# Patient Record
Sex: FEMALE | Race: WHITE | NOT HISPANIC OR LATINO | Employment: FULL TIME | URBAN - NONMETROPOLITAN AREA
[De-identification: names, ages, dates, MRNs, and addresses within clinical notes are randomized per-mention and may not be internally consistent; named-entity substitution may affect disease eponyms.]

---

## 2017-04-10 DIAGNOSIS — Z85.51 PERSONAL HISTORY OF MALIGNANT NEOPLASM OF BLADDER: Primary | ICD-10-CM

## 2017-06-05 ENCOUNTER — TELEPHONE (OUTPATIENT)
Dept: UROLOGY | Facility: OTHER | Age: 57
End: 2017-06-05

## 2017-06-05 NOTE — TELEPHONE ENCOUNTER
Spoke with pt via phone, reminded pt of appt with Dr. Couch on 6/13/17 at 2:45 pm and reminded pt come in to clinic either tomorrow or the next day to give a urine, pt agreed.  RENETTA SALGADO

## 2017-06-07 DIAGNOSIS — Z85.51 PERSONAL HISTORY OF MALIGNANT NEOPLASM OF BLADDER: ICD-10-CM

## 2017-06-07 LAB
ALBUMIN UR-MCNC: NEGATIVE MG/DL
APPEARANCE UR: CLEAR
BILIRUB UR QL STRIP: NEGATIVE
COLOR UR AUTO: YELLOW
GLUCOSE UR STRIP-MCNC: NEGATIVE MG/DL
HGB UR QL STRIP: NEGATIVE
KETONES UR STRIP-MCNC: NEGATIVE MG/DL
LEUKOCYTE ESTERASE UR QL STRIP: NEGATIVE
NITRATE UR QL: NEGATIVE
PH UR STRIP: 6.5 PH (ref 5–7)
SP GR UR STRIP: 1.02 (ref 1–1.03)
URN SPEC COLLECT METH UR: NORMAL
UROBILINOGEN UR STRIP-ACNC: 1 EU/DL (ref 0.2–1)

## 2017-06-07 PROCEDURE — 81003 URINALYSIS AUTO W/O SCOPE: CPT | Performed by: UROLOGY

## 2017-06-08 DIAGNOSIS — Z85.51 PERSONAL HISTORY OF MALIGNANT NEOPLASM OF BLADDER: ICD-10-CM

## 2017-06-08 PROCEDURE — 88108 CYTOPATH CONCENTRATE TECH: CPT | Mod: TC | Performed by: UROLOGY

## 2017-06-09 LAB — COPATH REPORT: NORMAL

## 2017-06-13 ENCOUNTER — OFFICE VISIT (OUTPATIENT)
Dept: UROLOGY | Facility: OTHER | Age: 57
End: 2017-06-13
Attending: UROLOGY
Payer: COMMERCIAL

## 2017-06-13 VITALS
WEIGHT: 120 LBS | TEMPERATURE: 98.6 F | BODY MASS INDEX: 22.66 KG/M2 | HEIGHT: 61 IN | SYSTOLIC BLOOD PRESSURE: 158 MMHG | HEART RATE: 78 BPM | OXYGEN SATURATION: 98 % | DIASTOLIC BLOOD PRESSURE: 70 MMHG

## 2017-06-13 DIAGNOSIS — C67.8 MALIGNANT NEOPLASM OF OVERLAPPING SITES OF BLADDER (H): Primary | ICD-10-CM

## 2017-06-13 PROCEDURE — 52000 CYSTOURETHROSCOPY: CPT | Performed by: UROLOGY

## 2017-06-13 ASSESSMENT — PAIN SCALES - GENERAL: PAINLEVEL: NO PAIN (0)

## 2017-06-13 NOTE — NURSING NOTE
"Chief Complaint   Patient presents with     Cystoscopy     History of bladder cancer.       Initial /70 (BP Location: Right arm, Patient Position: Chair, Cuff Size: Adult Regular)  Pulse 78  Temp 98.6  F (37  C) (Tympanic)  Ht 1.549 m (5' 1\")  Wt 54.4 kg (120 lb)  LMP 06/18/2005  SpO2 98%  BMI 22.67 kg/m2 Estimated body mass index is 22.67 kg/(m^2) as calculated from the following:    Height as of this encounter: 1.549 m (5' 1\").    Weight as of this encounter: 54.4 kg (120 lb).  Medication Reconciliation: complete   RENETTA SALGADO      "

## 2017-06-13 NOTE — PROGRESS NOTES
Kait Augustin is a 56 year old female with a hx of bladder cancer TaN0M0 with a history of urothelial carcinoma here for follow up    No hematuria    Date of Initial Diagnosis: 2/15  Stage of Initial Diagnosis: Ta  Grade at Initial Diagnosis: low  Site of First Diagnosis: bladder  Any Recurrence? 12/2015 (right UO and left trigone)  Intravesical Treatments: TURBT and stent  Date of Last Cysto: 12/16  Date of Last Upper Tract Imaging: none    CYSTOSCOPY PROCEDURE:  After sterile preparation and draping of the patient,  a 16-Guamanian flexible cystoscope was introduced via the urethra.  It was passed without difficulty into the bladder.  The urethra was open without evidence of stricture.  The ureteral orifices were orthotopic.  The bladder mucosa was evaluated using both antegrade and retroflex views and this showed no evidence of any lesions or trabeculation.  There was a reddened area on the right bladder wall. There were no stones.  This appears somewhat stable from last time.      A/P Hx of Low grade bladder cancer with one small recurrence.  no evidence of disease today, will follow up in 3 months    Follow up for a cysto in  6 months then q12 after that    Cytology negative      Efren Couch MD  Department of Urology Staff  [facility]

## 2017-06-13 NOTE — MR AVS SNAPSHOT
"              After Visit Summary   2017    Kait Augustin    MRN: [id number]           Patient Information     Date Of Birth          1960        Visit Information        Provider Department      2017 3:00 PM Weight, Efren Fowler MD [facility]        Today's Diagnoses     Malignant neoplasm of overlapping sites of bladder (H)    -  1       Follow-ups after your visit        Who to contact     If you have questions or need follow up information about today's clinic visit or your schedule please contact [facility] directly at [phone].  Normal or non-critical lab and imaging results will be communicated to you by [employer], letter or phone within 4 business days after the clinic has received the results. If you do not hear from us within 7 days, please contact the clinic through [employer] or phone. If you have a critical or abnormal lab result, we will notify you by phone as soon as possible.  Submit refill requests through [employer] or call your pharmacy and they will forward the refill request to us. Please allow 3 business days for your refill to be completed.          Additional Information About Your Visit        MyChart Information     [employer] lets you send messages to your doctor, view your test results, renew your prescriptions, schedule appointments and more. To sign up, go to [email or web address] . Click on \"Log in\" on the left side of the screen, which will take you to the Welcome page. Then click on \"Sign up Now\" on the right side of the page.     You will be asked to enter the access code listed below, as well as some personal information. Please follow the directions to create your username and password.     Your access code is: [email or web address]  Expires: 2017  3:33 PM     Your access code will  in 90 days. If you need help or a new code, please call your [facility] or [phone].        Care EveryWhere ID     This is your Care " "EveryWhere ID. This could be used by other organizations to access your [place] medical records  [id number]        Your Vitals Were     Pulse Temperature Height Last Period Pulse Oximetry BMI (Body Mass Index)    78 98.6  F (37  C) (Tympanic) 1.549 m (5' 1\") 06/18/2005 98% 22.67 kg/m2       Blood Pressure from Last 3 Encounters:   06/13/17 158/70   12/13/16 118/68   09/13/16 110/60    Weight from Last 3 Encounters:   06/13/17 54.4 kg (120 lb)   12/13/16 57.6 kg (127 lb)   09/13/16 59.4 kg (131 lb)              We Performed the Following     Cystoscopy ([address]78)        Primary Care Provider Office Phone # Fax #    Itzel CampDEMARCO domínguez [phone]       [address] [address]        Thank you!     Thank you for choosing [facility]  for your care. Our goal is always to provide you with excellent care. Hearing back from our patients is one way we can continue to improve our services. Please take a few minutes to complete the written survey that you may receive in the mail after your visit with us. Thank you!             Your Updated Medication List - Protect others around you: Learn how to safely use, store and throw away your medicines at www.disposemymeds.org.          This list is accurate as of: 6/13/17  3:33 PM.  Always use your most recent med list.                   Brand Name Dispense Instructions for use    OMEGA-3 FISH OIL PO      Take 1 g by mouth daily as needed (when I remember to take them)       TYLENOL PO      Take 325 mg by mouth every 4 hours as needed for mild pain or fever       VITAMIN D (CHOLECALCIFEROL) PO      Take 400 Units by mouth daily as needed (when I remember to take them)         "

## 2017-12-08 ENCOUNTER — TELEPHONE (OUTPATIENT)
Dept: UROLOGY | Facility: OTHER | Age: 57
End: 2017-12-08

## 2017-12-08 DIAGNOSIS — C67.8 MALIGNANT NEOPLASM OF OVERLAPPING SITES OF BLADDER (H): Primary | ICD-10-CM

## 2017-12-08 NOTE — TELEPHONE ENCOUNTER
Patient is requesting labs prior to 12/12/17 appointment with Dr. Couch.  Please call patient when orders are in [phone]

## 2017-12-11 DIAGNOSIS — C67.8 MALIGNANT NEOPLASM OF OVERLAPPING SITES OF BLADDER (H): ICD-10-CM

## 2017-12-11 LAB
ALBUMIN UR-MCNC: NEGATIVE MG/DL
APPEARANCE UR: CLEAR
BILIRUB UR QL STRIP: NEGATIVE
COLOR UR AUTO: YELLOW
GLUCOSE UR STRIP-MCNC: NEGATIVE MG/DL
HGB UR QL STRIP: NEGATIVE
KETONES UR STRIP-MCNC: NEGATIVE MG/DL
LEUKOCYTE ESTERASE UR QL STRIP: NEGATIVE
NITRATE UR QL: NEGATIVE
PH UR STRIP: 6.5 PH (ref 5–7)
SOURCE: NORMAL
SP GR UR STRIP: 1.01 (ref 1–1.03)
UROBILINOGEN UR STRIP-ACNC: 0.2 EU/DL (ref 0.2–1)

## 2017-12-11 PROCEDURE — 81003 URINALYSIS AUTO W/O SCOPE: CPT | Performed by: UROLOGY

## 2017-12-12 ENCOUNTER — OFFICE VISIT (OUTPATIENT)
Dept: UROLOGY | Facility: OTHER | Age: 57
End: 2017-12-12
Attending: UROLOGY
Payer: COMMERCIAL

## 2017-12-12 VITALS
WEIGHT: 127 LBS | HEART RATE: 56 BPM | TEMPERATURE: 98.2 F | HEIGHT: 61 IN | DIASTOLIC BLOOD PRESSURE: 70 MMHG | SYSTOLIC BLOOD PRESSURE: 110 MMHG | BODY MASS INDEX: 23.98 KG/M2

## 2017-12-12 DIAGNOSIS — C67.8 MALIGNANT NEOPLASM OF OVERLAPPING SITES OF BLADDER (H): Primary | ICD-10-CM

## 2017-12-12 PROCEDURE — 52000 CYSTOURETHROSCOPY: CPT | Performed by: UROLOGY

## 2017-12-12 ASSESSMENT — PAIN SCALES - GENERAL: PAINLEVEL: NO PAIN (0)

## 2017-12-12 NOTE — PROGRESS NOTES
Kait Augustin is a 57 year old female with a hx of bladder cancer TaN0M0 with a history of urothelial carcinoma here for follow up    No hematuria    Date of Initial Diagnosis: 2/15  Stage of Initial Diagnosis: Ta  Grade at Initial Diagnosis: low  Site of First Diagnosis: bladder  Any Recurrence? 12/2015 (right UO and left trigone)  Intravesical Treatments: TURBT and stent  Date of Last Cysto: 6/17  Date of Last Upper Tract Imaging: none    CYSTOSCOPY PROCEDURE:  After sterile preparation and draping of the patient,  a 16-Mohawk flexible cystoscope was introduced via the urethra.  It was passed without difficulty into the bladder.  The urethra was open without evidence of stricture.  The ureteral orifices were orthotopic.  The bladder mucosa was evaluated using both antegrade and retroflex views and this showed no evidence of any lesions or trabeculation.  There was a reddened area on the right bladder wall. There were no stones.  This appears somewhat stable from last time.      A/P Hx of Low grade bladder cancer with one small recurrence.  no evidence of disease today    Follow up for a cysto in 12 months    Cytology negative      Efren Couch MD  Department of Urology Staff  [facility]

## 2017-12-12 NOTE — MR AVS SNAPSHOT
"              After Visit Summary   12/12/2017    Kait Augustin    MRN: [id number]           Patient Information     Date Of Birth          1960        Visit Information        Provider Department      12/12/2017 1:00 PM [facility]        Today's Diagnoses     Malignant neoplasm of overlapping sites of bladder (H)    -  1      Care Instructions      AFTER YOUR CYSTOSCOPY        You have just completed a cystoscopy, or \"cysto\", which allowed your physician to learn more about your bladder (or to remove a stent placed after surgery). We suggest that you continue to avoid caffeine, fruit juice, and alcohol for the next 24 hours, however, you are encouraged to return to your normal activities.         A few things that are considered normal after your cystoscopy:     * Small amount of bleeding (or spotting) that clears within the next 24 hours     * Slight burning sensation with urination     * Sensation to of needing to avoid more frequently     * The feeling of \"air\" in your urine     * Mild discomfort that is relieved with Tylenol        Please contact our office promptly if you:     * Develop a fever above 101 degrees     * Are unable to urinate     * Develop bright red blood that does not stop     * Severe pain or swelling         Please contact our office with any concerns or questions @[facility].          Follow-ups after your visit        Follow-up notes from your care team     Return in 12 months (on 12/12/2018).      Who to contact     If you have questions or need follow up information about today's clinic visit or your schedule please contact [facility] directly at [phone].  Normal or non-critical lab and imaging results will be communicated to you by MyChart, letter or phone within 4 business days after the clinic has received the results. If you do not hear from us within 7 days, please contact the clinic through MyChart or phone. If " "you have a critical or abnormal lab result, we will notify you by phone as soon as possible.  Submit refill requests through [employer] or call your pharmacy and they will forward the refill request to us. Please allow 3 business days for your refill to be completed.          Additional Information About Your Visit        [employer] Information     [employer] lets you send messages to your doctor, view your test results, renew your prescriptions, schedule appointments and more. To sign up, go to [email or web address] . Click on \"Log in\" on the left side of the screen, which will take you to the Welcome page. Then click on \"Sign up Now\" on the right side of the page.     You will be asked to enter the access code listed below, as well as some personal information. Please follow the directions to create your username and password.     Your access code is: [email or web address]  Expires: 3/12/2018  1:24 PM     Your access code will  in 90 days. If you need help or a new code, please call your [place] clinic or [phone].        Care EveryWhere ID     This is your Care EveryWhere ID. This could be used by other organizations to access your [place] medical records  [id number]        Your Vitals Were     Pulse Temperature Height Last Period BMI (Body Mass Index)       56 98.2  F (36.8  C) (Tympanic) 1.549 m (5' 1\") 2005 24 kg/m2        Blood Pressure from Last 3 Encounters:   17 110/70   17 158/70   16 118/68    Weight from Last 3 Encounters:   17 57.6 kg (127 lb)   17 54.4 kg (120 lb)   16 57.6 kg (127 lb)              We Performed the Following     CYSTOURETHROSCOPY        Primary Care Provider Office Phone # Fax #    Itzel Phillip [phone] [address]  [place] [address]        Equal Access to Services     DOUGLAS ESTES AH: Ngoc Gomez, pam hodges, rosibel issaarash la'aan ah. So " [facility] [phone].    ATENCIÓN: Si ochoa wylie, tiene a espinoza disposición servicios gratuitos de asistencia lingüística. Dorian nixon [phone].    We comply with applicable federal civil rights laws and [place] laws. We do not discriminate on the basis of race, color, national origin, age, disability, sex, sexual orientation, or gender identity.            Thank you!     Thank you for choosing [facility]  for your care. Our goal is always to provide you with excellent care. Hearing back from our patients is one way we can continue to improve our services. Please take a few minutes to complete the written survey that you may receive in the mail after your visit with us. Thank you!             Your Updated Medication List - Protect others around you: Learn how to safely use, store and throw away your medicines at www.disposemymeds.org.          This list is accurate as of: 12/12/17  1:24 PM.  Always use your most recent med list.                   Brand Name Dispense Instructions for use Diagnosis    OMEGA-3 FISH OIL PO      Take 1 g by mouth daily as needed (when I remember to take them)        TYLENOL PO      Take 325 mg by mouth every 4 hours as needed for mild pain or fever        VITAMIN D (CHOLECALCIFEROL) PO      Take 400 Units by mouth daily as needed (when I remember to take them)

## 2017-12-12 NOTE — NURSING NOTE
"Chief Complaint   Patient presents with     Cystoscopy     History of bladder of cancer.       Initial /70 (BP Location: Left arm, Patient Position: Chair, Cuff Size: Adult Regular)  Pulse 56  Temp 98.2  F (36.8  C) (Tympanic)  Ht 1.549 m (5' 1\")  Wt 57.6 kg (127 lb)  LMP 06/18/2005  BMI 24 kg/m2 Estimated body mass index is 24 kg/(m^2) as calculated from the following:    Height as of this encounter: 1.549 m (5' 1\").    Weight as of this encounter: 57.6 kg (127 lb).  Medication Reconciliation: complete   RENETTA SALGADO      "

## 2018-01-20 ENCOUNTER — HEALTH MAINTENANCE LETTER (OUTPATIENT)
Age: 58
End: 2018-01-20

## 2018-05-22 NOTE — TELEPHONE ENCOUNTER
U/A ordered. Patient called.  Masha Fritz LPN     Pt with SMITH likely ATN now in setting of obstructive uropathy, losartan use, contrast use on (5/21)  and possible rhabdomyolysis.  Pt had initial Scr at 2.6 on (5/18) at OSH. Pt with Scr 5.4 on (5/20) which further trended up to 7.09 on (5/22). Pt with good urine oupt on L-NT, however minimal urine outpt on R-NT. Recommend to check CPK and renal sonogram. Monitor BMP, strict I/O. No plan for urgent HD at present. Avoid any further nephrotoxics, NSAIDs, RCA Pt with SMITH likely ATN now. SMITH multifactorial  in setting of obstructive uropathy, losartan use, contrast use on (5/21)  and possible rhabdomyolysis.  Pt had initial Scr at 2.6 on (5/18) at OSH. Pt with Scr 5.4 on (5/20) which further trended up to 7.09 on (5/22). Pt with good urine oupt on L-NT, however minimal urine outpt on R-NT. Recommend to check CPK and renal sonogram. Monitor BMP, strict I/O. No plan for urgent HD at present. Avoid any further nephrotoxics, NSAIDs, RCA Pt with SMITH likely ATN now. SMITH multifactorial  in setting of obstructive uropathy, losartan use, contrast use on (5/21)  and possible rhabdomyolysis.  Pt had initial Scr at 2.6 on (5/18) at OSH. Pt with Scr 5.4 on (5/20) which further trended up to 7.09 on (5/22). Pt with good urine oupt on L-NT, however minimal urine outpt on R-NT. Recommend to check CPK and renal sonogram. Monitor BMP, strict I/O. No plan for urgent HD at present. Pt agreeable if needed and consent obtained. Avoid any further nephrotoxics, NSAIDs, RCA Pt with SMITH likely ATN now. SMITH multifactorial  in setting of obstructive uropathy, losartan use, contrast use on (5/21)  and possible rhabdomyolysis.  Pt had initial Scr at 2.6 on (5/18) at OSH. Pt with Scr 5.4 on (5/20) which further trended up to 7.09 on (5/22). Pt with good urine oupt on L-NT, however minimal urine outpt on R-NT. Recommend to check CPK, urine lytes and renal sonogram. Monitor BMP, strict I/O. No plan for urgent HD at present. Pt agreeable if needed and consent obtained. Avoid any further nephrotoxics, NSAIDs, RCA

## 2018-10-15 ENCOUNTER — OFFICE VISIT (OUTPATIENT)
Dept: FAMILY MEDICINE | Facility: OTHER | Age: 58
End: 2018-10-15
Attending: NURSE PRACTITIONER
Payer: COMMERCIAL

## 2018-10-15 VITALS
HEIGHT: 61 IN | SYSTOLIC BLOOD PRESSURE: 108 MMHG | BODY MASS INDEX: 23.41 KG/M2 | OXYGEN SATURATION: 99 % | RESPIRATION RATE: 14 BRPM | DIASTOLIC BLOOD PRESSURE: 62 MMHG | HEART RATE: 80 BPM | WEIGHT: 124 LBS | TEMPERATURE: 99.9 F

## 2018-10-15 DIAGNOSIS — Z12.31 ENCOUNTER FOR SCREENING MAMMOGRAM FOR BREAST CANCER: ICD-10-CM

## 2018-10-15 DIAGNOSIS — R82.79 ABNORMAL FINDINGS ON MICROBIOLOGICAL EXAMINATION OF URINE: ICD-10-CM

## 2018-10-15 DIAGNOSIS — N12 PYELONEPHRITIS: ICD-10-CM

## 2018-10-15 DIAGNOSIS — Z71.6 TOBACCO ABUSE COUNSELING: ICD-10-CM

## 2018-10-15 DIAGNOSIS — Z72.0 TOBACCO ABUSE: ICD-10-CM

## 2018-10-15 DIAGNOSIS — R30.0 DYSURIA: Primary | ICD-10-CM

## 2018-10-15 LAB
ALBUMIN SERPL-MCNC: 4 G/DL (ref 3.4–5)
ALBUMIN UR-MCNC: 100 MG/DL
ANION GAP SERPL CALCULATED.3IONS-SCNC: 9 MMOL/L (ref 3–14)
APPEARANCE UR: ABNORMAL
BACTERIA #/AREA URNS HPF: ABNORMAL /HPF
BASOPHILS # BLD AUTO: 0 10E9/L (ref 0–0.2)
BASOPHILS NFR BLD AUTO: 0.3 %
BILIRUB UR QL STRIP: ABNORMAL
BUN SERPL-MCNC: 12 MG/DL (ref 7–30)
CALCIUM SERPL-MCNC: 9.1 MG/DL (ref 8.5–10.1)
CHLORIDE SERPL-SCNC: 104 MMOL/L (ref 94–109)
CO2 SERPL-SCNC: 23 MMOL/L (ref 20–32)
COLOR UR AUTO: YELLOW
CREAT SERPL-MCNC: 0.72 MG/DL (ref 0.52–1.04)
DIFFERENTIAL METHOD BLD: ABNORMAL
EOSINOPHIL # BLD AUTO: 0 10E9/L (ref 0–0.7)
EOSINOPHIL NFR BLD AUTO: 0.1 %
ERYTHROCYTE [DISTWIDTH] IN BLOOD BY AUTOMATED COUNT: 13.2 % (ref 10–15)
GFR SERPL CREATININE-BSD FRML MDRD: 83 ML/MIN/1.7M2
GLUCOSE SERPL-MCNC: 104 MG/DL (ref 70–99)
GLUCOSE UR STRIP-MCNC: NEGATIVE MG/DL
HCT VFR BLD AUTO: 43.5 % (ref 35–47)
HGB BLD-MCNC: 14.8 G/DL (ref 11.7–15.7)
HGB UR QL STRIP: ABNORMAL
KETONES UR STRIP-MCNC: ABNORMAL MG/DL
LEUKOCYTE ESTERASE UR QL STRIP: ABNORMAL
LYMPHOCYTES # BLD AUTO: 2.1 10E9/L (ref 0.8–5.3)
LYMPHOCYTES NFR BLD AUTO: 14.7 %
MCH RBC QN AUTO: 31.9 PG (ref 26.5–33)
MCHC RBC AUTO-ENTMCNC: 34 G/DL (ref 31.5–36.5)
MCV RBC AUTO: 94 FL (ref 78–100)
MONOCYTES # BLD AUTO: 1.9 10E9/L (ref 0–1.3)
MONOCYTES NFR BLD AUTO: 13.2 %
NEUTROPHILS # BLD AUTO: 10.4 10E9/L (ref 1.6–8.3)
NEUTROPHILS NFR BLD AUTO: 71.7 %
NITRATE UR QL: POSITIVE
PH UR STRIP: 6 PH (ref 5–7)
PHOSPHATE SERPL-MCNC: 3 MG/DL (ref 2.5–4.5)
PLATELET # BLD AUTO: 184 10E9/L (ref 150–450)
POTASSIUM SERPL-SCNC: 3.7 MMOL/L (ref 3.4–5.3)
RBC # BLD AUTO: 4.64 10E12/L (ref 3.8–5.2)
RBC #/AREA URNS AUTO: ABNORMAL /HPF
SODIUM SERPL-SCNC: 136 MMOL/L (ref 133–144)
SOURCE: ABNORMAL
SP GR UR STRIP: >1.03 (ref 1–1.03)
UROBILINOGEN UR STRIP-ACNC: 1 EU/DL (ref 0.2–1)
WBC # BLD AUTO: 14.5 10E9/L (ref 4–11)
WBC #/AREA URNS AUTO: ABNORMAL /HPF

## 2018-10-15 PROCEDURE — 87186 SC STD MICRODIL/AGAR DIL: CPT | Performed by: NURSE PRACTITIONER

## 2018-10-15 PROCEDURE — 36415 COLL VENOUS BLD VENIPUNCTURE: CPT | Performed by: NURSE PRACTITIONER

## 2018-10-15 PROCEDURE — 87088 URINE BACTERIA CULTURE: CPT | Performed by: NURSE PRACTITIONER

## 2018-10-15 PROCEDURE — 85025 COMPLETE CBC W/AUTO DIFF WBC: CPT | Performed by: NURSE PRACTITIONER

## 2018-10-15 PROCEDURE — 80069 RENAL FUNCTION PANEL: CPT | Performed by: NURSE PRACTITIONER

## 2018-10-15 PROCEDURE — 81001 URINALYSIS AUTO W/SCOPE: CPT | Performed by: NURSE PRACTITIONER

## 2018-10-15 PROCEDURE — 87086 URINE CULTURE/COLONY COUNT: CPT | Performed by: NURSE PRACTITIONER

## 2018-10-15 PROCEDURE — 99214 OFFICE O/P EST MOD 30 MIN: CPT | Performed by: NURSE PRACTITIONER

## 2018-10-15 RX ORDER — CIPROFLOXACIN 500 MG/1
500 TABLET, FILM COATED ORAL 2 TIMES DAILY
Qty: 14 TABLET | Refills: 0 | Status: SHIPPED | OUTPATIENT
Start: 2018-10-15 | End: 2019-03-13

## 2018-10-15 RX ORDER — CEFTRIAXONE 1 G/1
2000 INJECTION, POWDER, FOR SOLUTION INTRAMUSCULAR; INTRAVENOUS ONCE
Qty: 20 ML | Refills: 0 | OUTPATIENT
Start: 2018-10-15 | End: 2019-03-13

## 2018-10-15 ASSESSMENT — ANXIETY QUESTIONNAIRES
1. FEELING NERVOUS, ANXIOUS, OR ON EDGE: SEVERAL DAYS
IF YOU CHECKED OFF ANY PROBLEMS ON THIS QUESTIONNAIRE, HOW DIFFICULT HAVE THESE PROBLEMS MADE IT FOR YOU TO DO YOUR WORK, TAKE CARE OF THINGS AT HOME, OR GET ALONG WITH OTHER PEOPLE: NOT DIFFICULT AT ALL
GAD7 TOTAL SCORE: 1
6. BECOMING EASILY ANNOYED OR IRRITABLE: NOT AT ALL
7. FEELING AFRAID AS IF SOMETHING AWFUL MIGHT HAPPEN: NOT AT ALL
5. BEING SO RESTLESS THAT IT IS HARD TO SIT STILL: NOT AT ALL
2. NOT BEING ABLE TO STOP OR CONTROL WORRYING: NOT AT ALL
3. WORRYING TOO MUCH ABOUT DIFFERENT THINGS: NOT AT ALL

## 2018-10-15 ASSESSMENT — PAIN SCALES - GENERAL: PAINLEVEL: EXTREME PAIN (8)

## 2018-10-15 ASSESSMENT — PATIENT HEALTH QUESTIONNAIRE - PHQ9: 5. POOR APPETITE OR OVEREATING: NOT AT ALL

## 2018-10-15 NOTE — NURSING NOTE
"Chief Complaint   Patient presents with     Urinary Problem       Initial /62 (BP Location: Right arm, Patient Position: Sitting, Cuff Size: Adult Regular)  Pulse 96  Temp 99.9  F (37.7  C) (Tympanic)  Resp 14  Ht 5' 1\" (1.549 m)  Wt 124 lb (56.2 kg)  LMP 06/18/2005  SpO2 99%  BMI 23.43 kg/m2 Estimated body mass index is 23.43 kg/(m^2) as calculated from the following:    Height as of this encounter: 5' 1\" (1.549 m).    Weight as of this encounter: 124 lb (56.2 kg).  Medication Reconciliation: complete    Sandra Harris LPN    "

## 2018-10-15 NOTE — PROGRESS NOTES
Pt will come in on thurs for CBC, reports feeling much better already after injections of Rocephin.

## 2018-10-15 NOTE — PROGRESS NOTES
I expected this to be high  Pyelonephritis  You'll be checking on her tomorrow afternoon    Should prob recommend a repeat CBC Wed to assure she is improving.   Lab only - entered

## 2018-10-15 NOTE — MR AVS SNAPSHOT
After Visit Summary   10/15/2018    Kait Augustin    MRN: [id number]           Patient Information     Date Of Birth          1960        Visit Information        Provider Department      10/15/2018 10:45 AM Itzel Phillip NP [facility]        Today's Diagnoses     Dysuria    -  1    Abnormal findings on microbiological examination of urine        Pyelonephritis        Encounter for screening mammogram for breast cancer        Tobacco abuse        Tobacco abuse counseling          Care Instructions    Results for orders placed or performed in visit on 10/15/18 (from the past 24 hour(s))   *UA reflex to Microscopic and Culture - [facility]   Result Value Ref Range    Color Urine Yellow     Appearance Urine Slightly Cloudy     Glucose Urine Negative NEG^Negative mg/dL    Bilirubin Urine Small (A) NEG^Negative    Ketones Urine Trace (A) NEG^Negative mg/dL    Specific Gravity Urine >1.030 1.003 - 1.035    Blood Urine Large (A) NEG^Negative    pH Urine 6.0 5.0 - 7.0 pH    Protein Albumin Urine 100 (A) NEG^Negative mg/dL    Urobilinogen Urine 1.0 0.2 - 1.0 EU/dL    Nitrite Urine Positive (A) NEG^Negative    Leukocyte Esterase Urine Trace (A) NEG^Negative    Source Midstream Urine    Urine Microscopic   Result Value Ref Range    WBC Urine 5-10 (A) OTO5^0 - 5 /HPF    RBC Urine 10-25 (A) OTO2^O - 2 /HPF    Bacteria Urine Many (A) NEG^Negative /HPF             ASSESSMENT/PLAN:     1. Dysuria  - UA reflex to Microscopic and Culture - [facility]  - Urine Microscopic    2. Abnormal findings on microbiological examination of urine  - Urine Culture Aerobic Bacterial  - CBC with platelets differential  - Renal panel  - cefTRIAXone (ROCEPHIN) 1 GM vial; Inject 2 g (2,000 mg) into the muscle once for 1 dose  Dispense: 20 mL; Refill: 0  - ciprofloxacin (CIPRO) 500 MG tablet; Take 1 tablet (500 mg) by mouth 2 times daily  Dispense: 14 tablet; Refill: 0    3. Pyelonephritis  -  cefTRIAXone (ROCEPHIN) 1 GM vial; Inject 2 g (2,000 mg) into the muscle once for 1 dose  Dispense: 20 mL; Refill: 0  - ciprofloxacin (CIPRO) 500 MG tablet; Take 1 tablet (500 mg) by mouth 2 times daily  Dispense: 14 tablet; Refill: 0      Increase fluids  Keep temp down, Tylenol OTC  Cranberry juice   To ER with increased symptoms      4. Encounter for screening mammogram for breast cancer  - MA SCREENING DIGITAL BILATERAL ([place]); Future    5. Tobacco abuse  - Tobacco Cessation - Order to Satisfy Health Maintenance    6. Tobacco abuse counseling  - See attached                          Pyelonephritis (Kidney Infection)             What is pyelonephritis?   Pyelonephritis is bacterial infection of one or both kidneys. Kidney infection can be serious because it can permanently damage the kidneys. Also, the infection may enter the bloodstream and cause life-threatening infection. Another problem is that it can cause pregnant women to go into labor too early (premature labor).   Kidney infections are much more common in women than men.   How does it occur?   Kidney infections usually start in the bladder or the urethra, which is the tube that empties urine from the bladder. Bladder infections can happen when bacteria travel from the vagina or rectal area (anus) into the urethra and bladder. The bacteria travel up to the kidneys from the bladder. In men, the infection might also start as a prostate infection. Bacteria can also spread to the kidneys from an infection somewhere else in the body.   The urinary system is a common site of birth defects. If your urinary system is abnormal, you have a greater risk that a bladder infection will spread to the kidneys.   Kidney stones increase the risk of infection.   What are the symptoms?   The symptoms range from mild to severe. They may include:   fever   chills or sweats   abdominal or back pain   loss of appetite   nausea or vomiting   problems with urination, such as  pain when you urinate or a frequent urge to urinate.   Sometimes it is hard to know whether urinary symptoms are caused by an infection of the lower urinary tract (for example, the bladder) or by a kidney infection. The symptoms should not be ignored. If you have symptoms, see your healthcare provider right away. A bladder infection can quickly lead to pyelonephritis. The infection can spread to the bloodstream. Do not wait to get treatment.   How is it diagnosed?   Your healthcare provider will review your medical history, looking especially for current or recent lower urinary tract infection. You will also have a physical exam. Your provider will check for pain in the kidney area. A sample of your urine will be tested in the lab.   How is it treated?   Antibiotic medicine is the main treatment for a kidney infection. You may be able to start your treatment at home. You may be able to keep being treated at home if your symptoms seem to be getting better within 24 hours after you start taking the medicine. If you are more seriously ill, or if the medicine does not seem to be working well, you may need to be in the hospital. There you can be given fluids and medicine through an IV.   Usually you will need to take an antibiotic for at least 7 days. All bacteria must be killed to prevent kidney damage and to keep the infection from coming back.   Your antibiotic may need to be changed after the first day or two of treatment if lab tests of the bacteria in your urine show a different antibiotic will work better. Your healthcare provider will tell you if this is the case.   If you have an abnormal urinary tract or stones in your urinary tract, you may need surgery.   How long do the effects last?   How long it takes to get better depends on how severe your symptoms are when you start treatment. In mild cases, you will start feeling better in the first 1 or 2 days. It may take several days if you have a more severe  infection.   With proper treatment there are usually no complications of pyelonephritis. If you keep having symptoms, or your symptoms come back soon after you finish your treatment, you may need more tests. Your healthcare provider will check for another cause of the infection or to see if another antibiotic is needed.   How can I take care of myself?   Follow the treatment plan your healthcare provider prescribes.   If you have a fever:   Ask your healthcare provider if you should take aspirin or acetaminophen for the fever. Check with your healthcare provider before you give any medicine that contains aspirin or salicylates to a child or teen. This includes medicines like baby aspirin, some cold medicines, and Pepto Bismol. Children and teens who take aspirin are at risk for a serious illness called Reye's syndrome.   Keep a daily record of your temperature.   Notice whether your symptoms get better within 1 to 2 days after you start taking the antibiotic.   Drink plenty of fluids.   Take your medicines exactly as your provider tells you to.   Tell your provider right away about any symptoms that get worse or come back.   Follow your healthcare provider's directions for a follow-up urine test. Your provider may want to test your urine soon after you finish taking the antibiotic.   Call your provider if you have new or worsening:   fever   chills or sweats   abdominal or back pain   nausea or vomiting   problems with urination, such as pain when you urinate or a frequent urge to urinate   blood in your urine.   How can I help prevent pyelonephritis?   The best way to prevent kidney infections is to try to prevent bladder infections.   Women can take the following steps to try to prevent bladder infections:   Drink plenty of water and other beverages.   Practice good hygiene when you use the toilet, such as wiping from front to back.   Avoid using irritating cosmetics or chemicals in the area of the vagina and  urethra, such as strong soaps and scented sanitary napkins or panty liners.   Urinate to empty your bladder after you have sex.   Men can take the following steps to try to prevent bladder infections:   Drink plenty of water and other beverages.   Practice good genital hygiene. If you are a man who has not been circumcised, good hygiene includes gently pulling back the foreskin to wash the tip of the penis every time you bathe or shower.   Urinate to empty your bladder after you have sex.          Published by [employer].  This content is reviewed periodically and is subject to change as new health information becomes available. The information is intended to inform and educate and is not a replacement for medical evaluation, advice, diagnosis or treatment by a healthcare professional.   Developed by [employer].   ? 2010 [facility] and/or its affiliates. All Rights Reserved.   Copyright   Clinical Reference Systems 2011          Itzel Phillip NP  [facility]      HOW TO QUIT SMOKING  Smoking is one of the hardest habits to break. About half of all those who have ever smoked have been able to quit, and most of those (about 70%) who still smoke want to quit. Here are some of the best ways to stop smoking.     KEEP TRYING:  It takes most smokers about 8 tries before they are finally able to fully quit. So, the more often you try and fail, the better your chance of quitting the next time! So, don't give up!    GO COLD TURKEY:  Most ex-smokers quit cold turkey. Trying to cut back gradually doesn't seem to work as well, perhaps because it continues the smoking habit. Also, it is possible to fool yourself by inhaling more while smoking fewer cigarettes. This results in the same amount of nicotine in your body!    GET SUPPORT:  Support programs can make an important difference, especially for the heavy smoker. These groups offer lectures, methods to change your behavior and peer support. Call the free  national Quitline for more information. 800-QUIT-NOW (033-874-0875). Low-cost or free programs are offered by many hospitals, local chapters of the American Lung Association (373-850-0399) and the American Cancer Society (975-444-9990). Support at home is important too. Non-smokers can help by offering praise and encouragement. If the smoker fails to quit, encourage them to try again!    OVER-THE-COUNTER MEDICINES:  For those who can't quit on their own, Nicotine Replacement Therapy (NRT) may make quitting much easier. Certain aids such as the nicotine patch, gum and lozenge are available without a prescription. However, it is best to use these under the guidance of your doctor. The skin patch provides a steady supply of nicotine to the body. Nicotine gum and lozenge gives temporary bursts of low levels of nicotine. Both methods take the edge off the craving for cigarettes. WARNING: If you feel symptoms of nicotine overdose, such as nausea, vomiting, dizziness, weakness, or fast heartbeat, stop using these and see your doctor.    PRESCRIPTION MEDICINES:  After evaluating your smoking patterns and prior attempts at quitting, your doctor may offer a prescription medicine such as bupropion (Zyban, Wellbutrin), varenicline (Chantix, Champix), a niocotine inhaler or nasal spray. Each has its unique advantage and side effects which your doctor can review with you.    HEALTH BENEFITS OF QUITTING:  The benefits of quitting start right away and keep improving the longer you go without smokin minutes: blood pressure and pulse return to normal  8 hours: oxygen levels return to normal  2 days: ability to smell and taste begins to improve as damaged nerves start to regrow  2-3 weeks: circulation and lung function improves  1-9 months: decreased cough, congestion and shortness of breath; less tired  1 year: risk of heart attack decreases by half  5 years: risk of lung cancer decreases by half; risk of stroke becomes the  same as a non-smoker  For information about how to quit smoking, visit the following links:  National Cancer [place] ,   Clearing the Air, Quit Smoking Today   - an online booklet. http://www.smokefree.gov/pubs/clearing_the_air.pdf  Smokefree.gov http://smokefree.gov/  QuitNet http://www.quitnet.com/    0170-8019 [employer], [address] [place] [address], [place] [address]. All rights reserved. This information is not intended as a substitute for professional medical care. Always follow your healthcare professional's instructions.    The Benefits of Living Smoke Free  What do you want to gain from quitting? Check off some reasons to quit.  Health Benefits  ___ Reduce my risk of lung cancer, heart disease, chronic lung disease  ___ Have fewer wrinkles and softer skin  ___ Improve my sense of taste and smell  ___ For pregnant women--reduce the risk of having a miscarriage, stillbirth, premature birth, or low-birth-weight baby  Personal Benefits  ___ Feel more in control of my life  ___ Have better-smelling hair, breath, clothes, home, and car  ___ Save time by not having to take smoke breaks, buy cigarettes, or hunt for a light  ___ Have whiter teeth  Family Benefits  ___ Reduce my children s respiratory tract infections  ___ Set a good example for my children  ___ Reduce my family s cancer risk  Financial Benefits  ___ Save hundreds of dollars each year that would be spent on cigarettes  ___ Save money on medical bills  ___ Save on life, health, and car insurance premiums    Those Dollars Add Up!  Cigarettes are expensive, and getting more expensive all the time. Do you realize how much money you are spending on cigarettes per year? What is the average amount you spend on a pack of cigarettes? What is the average number of packs that you smoke per day? Using your answers to these questions, fill in this formula to help you find out:  ($ _____ per pack) ×  ( _____ number of packs per day) × (365 days) =  $ _____  yearly cost of smoking  Besides tobacco, there are other costs, including extra cleaning bills and replacement costs for clothing and furniture; medical expenses for smoking-related illnesses; and higher health, life, and car insurance premiums.    Cigars and Pipes Count Too!  Cigars and pipes are also dangerous. So are smokeless (chewing) tobacco and snuff. All of these products contain nicotine, a highly addictive substance that has harmful effects on your body. Quitting smoking means giving up all tobacco products.      5953-0691 [facility], [address] [place] [address], [place] [address]. All rights reserved. This information is not intended as a substitute for professional medical care. Always follow your healthcare professional's instructions.          Follow-ups after your visit        Future tests that were ordered for you today     Open Future Orders        Priority Expected Expires Ordered    MA SCREENING DIGITAL BILATERAL ([place]) Routine  10/15/2019 10/15/2018            Who to contact     If you have questions or need follow up information about today's clinic visit or your schedule please contact [facility] directly at [phone].  Normal or non-critical lab and imaging results will be communicated to you by MyChart, letter or phone within 4 business days after the clinic has received the results. If you do not hear from us within 7 days, please contact the clinic through MyChart or phone. If you have a critical or abnormal lab result, we will notify you by phone as soon as possible.  Submit refill requests through [employer] or call your pharmacy and they will forward the refill request to us. Please allow 3 business days for your refill to be completed.          Additional Information About Your Visit        Care EveryWhere ID     This is your Care EveryWhere ID. This could be used by other organizations to access your [place] medical records  [id number]        Your Vitals  "Were     Pulse Temperature Respirations Height Last Period Pulse Oximetry    80 99.9  F (37.7  C) (Tympanic) 14 5' 1\" (1.549 m) 06/18/2005 99%    BMI (Body Mass Index)                   23.43 kg/m2            Blood Pressure from Last 3 Encounters:   10/15/18 108/62   12/12/17 110/70   06/13/17 158/70    Weight from Last 3 Encounters:   10/15/18 124 lb (56.2 kg)   12/12/17 127 lb (57.6 kg)   06/13/17 120 lb (54.4 kg)              We Performed the Following     *UA reflex to Microscopic and Culture - [facility]     CBC with platelets differential     Renal panel     Tobacco Cessation - Order to Satisfy Health Maintenance     Urine Culture Aerobic Bacterial     Urine Microscopic          Today's Medication Changes          These changes are accurate as of 10/15/18 11:33 AM.  If you have any questions, ask your nurse or doctor.               Start taking these medicines.        Dose/Directions    cefTRIAXone 1 GM vial   Commonly known as:  ROCEPHIN   Used for:  Pyelonephritis, Abnormal findings on microbiological examination of urine   Started by:  Itzel Phillip NP        Dose:  2000 mg   Inject 2 g (2,000 mg) into the muscle once for 1 dose   Quantity:  20 mL   Refills:  0       ciprofloxacin 500 MG tablet   Commonly known as:  CIPRO   Used for:  Abnormal findings on microbiological examination of urine, Pyelonephritis   Started by:  Itzel hPillip NP        Dose:  500 mg   Take 1 tablet (500 mg) by mouth 2 times daily   Quantity:  14 tablet   Refills:  0            Where to get your medicines      These medications were sent to [id number] - [place], [place] [address] [facility] [address]  [address], [place] [address]     Phone:  [phone]     ciprofloxacin 500 MG tablet         Some of these will need a paper prescription and others can be bought over the counter.  Ask your nurse if you have questions.     You don't need a prescription for these medications     " cefTRIAXone 1 GM vial                Primary Care Provider Office Phone # Fax #    Itzel Phillip [phone] [address]  [place] [address]        Equal Access to Services     DOUGLAS ESTES : Hadii aad ku hadsusano Soomaali, waaxda luqadaha, qaybta kaalmada adeegyada, rosibel estebanin hayaascot garciabrittneekizzy hewitt. So [facility] [phone].    ATENCIÓN: Si habla español, tiene a espinoza disposición servicios gratuitos de asistencia lingüística. Llame al [phone].    We comply with applicable federal civil rights laws and [place] laws. We do not discriminate on the basis of race, color, national origin, age, disability, sex, sexual orientation, or gender identity.            Thank you!     Thank you for choosing [facility]  for your care. Our goal is always to provide you with excellent care. Hearing back from our patients is one way we can continue to improve our services. Please take a few minutes to complete the written survey that you may receive in the mail after your visit with us. Thank you!             Your Updated Medication List - Protect others around you: Learn how to safely use, store and throw away your medicines at www.disposemymeds.org.          This list is accurate as of 10/15/18 11:33 AM.  Always use your most recent med list.                   Brand Name Dispense Instructions for use Diagnosis    cefTRIAXone 1 GM vial    ROCEPHIN    20 mL    Inject 2 g (2,000 mg) into the muscle once for 1 dose    Pyelonephritis, Abnormal findings on microbiological examination of urine       ciprofloxacin 500 MG tablet    CIPRO    14 tablet    Take 1 tablet (500 mg) by mouth 2 times daily    Abnormal findings on microbiological examination of urine, Pyelonephritis       OMEGA-3 FISH OIL PO      Take 1 g by mouth daily as needed (when I remember to take them)        TYLENOL PO      Take 325 mg by mouth every 4 hours as needed for mild pain or fever        VITAMIN D  (CHOLECALCIFEROL) PO      Take 400 Units by mouth daily as needed (when I remember to take them)

## 2018-10-15 NOTE — NURSING NOTE
The following medication was given:     MEDICATION: Rocephin 2000mg and Lidocaine 4.2cc  ROUTE: IM  SITE: LUQ - Gluteus and RUQ-gluteus  DOSE: 1000mg each dose  LOT #: [id number]  :  Brandie  EXPIRATION DATE:  11/2020  NDC#: 9489-2638-24  Pt waits 15 min, no adverse reaction    Janis Rubin

## 2018-10-15 NOTE — PROGRESS NOTES
SUBJECTIVE:   Kait Augustin is a 58 year old female who presents to clinic today for the following health issues:        URINARY TRACT SYMPTOMS  Onset: Friday    Description:   Painful urination (Dysuria): no   Blood in urine (Hematuria): no     Delay in urine (Hesitency): no     Intensity: moderate    Frequency    Progression of Symptoms:  worsening    Accompanying Signs & Symptoms:  Fever/chills: YES  Flank pain YES  Nausea and vomiting: no   Any vaginal symptoms: none  Abdominal/Pelvic Pain: no     History:   History of frequent UTI's: no   History of kidney stones: YES  Sexually Active: no   Possibility of pregnancy: No    Precipitating factors:   none  Therapies Tried and outcome:  OTC advil or tylenol         Renal calculi many years ago x 1        PHQ-9 SCORE 11/17/2014 10/15/2018   Total Score 11 -   Total Score - 0     ADONIS-7 SCORE 10/15/2018   Total Score 1         Problem list and histories reviewed & adjusted, as indicated.  Additional history: as documented    Patient Active Problem List   Diagnosis     H/O renal calculi     Tobacco abuse     Malignant neoplasm of overlapping sites of bladder (H)     ACP (advance care planning)     Past Surgical History:   Procedure Laterality Date     C LIGATE FALLOPIAN TUBE  1996     CYSTOSCOPY, RETROGRADES, INSERT STENT URETER(S), COMBINED Right 12/8/2015    Procedure: COMBINED CYSTOSCOPY, RETROGRADES, INSERT STENT URETER(S);  Surgeon: Efren Couch MD;  Location: [facility]     CYSTOSCOPY, TRANSURETHRAL RESECTION (TUR) TUMOR BLADDER, COMBINED N/A 2/10/2015    Procedure: COMBINED CYSTOSCOPY, TRANSURETHRAL RESECTION (TUR) TUMOR BLADDER;  Surgeon: Efren Couch MD;  Location: [facility]     CYSTOSCOPY, TRANSURETHRAL RESECTION (TUR) TUMOR BLADDER, COMBINED N/A 12/8/2015    Procedure: COMBINED CYSTOSCOPY, TRANSURETHRAL RESECTION (TUR) TUMOR BLADDER;  Surgeon: Efren Couch MD;  Location: [facility]       Social History   Substance Use Topics      Smoking status: Current Every Day Smoker     Packs/day: 1.00     Years: 25.00     Types: Cigarettes     Smokeless tobacco: Never Used     Alcohol use Yes      Comment: rare     Family History   Problem Relation Age of Onset     C.A.D. Father      dx late 60's, CABG, angioplasty     Cancer Father      Lung, dx age 80 h/o tobacco use     HEART DISEASE Mother      Thyroid Disease Mother      Lipids Sister          Current Outpatient Prescriptions   Medication Sig Dispense Refill     Acetaminophen (TYLENOL PO) Take 325 mg by mouth every 4 hours as needed for mild pain or fever       Omega-3 Fatty Acids (OMEGA-3 FISH OIL PO) Take 1 g by mouth daily as needed (when I remember to take them)        VITAMIN D, CHOLECALCIFEROL, PO Take 400 Units by mouth daily as needed (when I remember to take them)        Allergies   Allergen Reactions     No Known Allergies      Recent Labs   Lab Test  12/01/15   1544  08/06/15   1508  11/17/14   1052   LDL   --    --   180*   HDL   --    --   65   TRIG   --    --   128   CR  0.70  0.98  0.71   GFRESTIMATED  87  59*  86   GFRESTBLACK  >90   GFR Calc    72  >90   GFR Calc     POTASSIUM  3.8  3.8  3.9   TSH   --    --   1.00      BP Readings from Last 3 Encounters:   10/15/18 108/62   12/12/17 110/70   06/13/17 158/70    Wt Readings from Last 3 Encounters:   10/15/18 124 lb (56.2 kg)   12/12/17 127 lb (57.6 kg)   06/13/17 120 lb (54.4 kg)                  Labs reviewed in EPIC    Reviewed and updated as needed this visit by clinical staff  Tobacco  Allergies  Meds       Reviewed and updated as needed this visit by Provider         ROS:  CONSTITUTIONAL:fever, chills, body aches  INTEGUMENTARY/SKIN: NEGATIVE for worrisome rashes, moles or lesions  EYES: NEGATIVE for vision changes or irritation  ENT/MOUTH: NEGATIVE for ear, mouth and throat problems  RESP: NEGATIVE for significant cough or SOB  CV: NEGATIVE for chest pain, palpitations or peripheral  "edema  GI: NEGATIVE for nausea, abdominal pain, heartburn, or change in bowel habits   female: retention and urgency  MUSCULOSKELETAL: NEGATIVE for significant arthralgias or myalgia  NEURO: NEGATIVE for weakness, dizziness or paresthesias  ENDOCRINE: NEGATIVE for temperature intolerance, skin/hair changes  HEME: NEGATIVE for bleeding problems  PSYCHIATRIC: NEGATIVE for changes in mood or affect    OBJECTIVE:     /62 (BP Location: Right arm, Patient Position: Sitting, Cuff Size: Adult Regular)  Pulse 96  Temp 99.9  F (37.7  C) (Tympanic)  Resp 14  Ht 5' 1\" (1.549 m)  Wt 124 lb (56.2 kg)  LMP 06/18/2005  SpO2 99%  BMI 23.43 kg/m2  Body mass index is 23.43 kg/(m^2).     GENERAL: healthy, alert and no distress  EYES: Eyes grossly normal to inspection, PERRL and conjunctivae and sclerae normal  HENT: ear canals and TM's normal, nose and mouth without ulcers or lesions  NECK: no adenopathy, no asymmetry, masses, or scars and thyroid normal to palpation  RESP: lungs clear to auscultation - no rales, rhonchi or wheezes  CV: regular rate and rhythm, normal S1 S2, no S3 or S4, no murmur, click or rub, no peripheral edema and peripheral pulses strong  ABDOMEN: soft, nontender, no hepatosplenomegaly, no masses and bowel sounds normal  MS: bilateral low back pain, tightness  SKIN: no suspicious lesions or rashes  PSYCH: mentation appears normal, affect normal/bright      Diagnostic Test Results:  Results for orders placed or performed in visit on 10/15/18 (from the past 24 hour(s))   *UA reflex to Microscopic and Culture - [facility]   Result Value Ref Range    Color Urine Yellow     Appearance Urine Slightly Cloudy     Glucose Urine Negative NEG^Negative mg/dL    Bilirubin Urine Small (A) NEG^Negative    Ketones Urine Trace (A) NEG^Negative mg/dL    Specific Gravity Urine >1.030 1.003 - 1.035    Blood Urine Large (A) NEG^Negative    pH Urine 6.0 5.0 - 7.0 pH    Protein Albumin Urine 100 (A) NEG^Negative " mg/dL    Urobilinogen Urine 1.0 0.2 - 1.0 EU/dL    Nitrite Urine Positive (A) NEG^Negative    Leukocyte Esterase Urine Trace (A) NEG^Negative    Source Midstream Urine    Urine Microscopic   Result Value Ref Range    WBC Urine 5-10 (A) OTO5^0 - 5 /HPF    RBC Urine 10-25 (A) OTO2^O - 2 /HPF    Bacteria Urine Many (A) NEG^Negative /HPF       ASSESSMENT/PLAN:     1. Dysuria  - UA reflex to Microscopic and Culture - [facility]  - Urine Microscopic    2. Abnormal findings on microbiological examination of urine  - Urine Culture Aerobic Bacterial  - CBC with platelets differential  - Renal panel  - cefTRIAXone (ROCEPHIN) 1 GM vial; Inject 2 g (2,000 mg) into the muscle once for 1 dose  Dispense: 20 mL; Refill: 0  - ciprofloxacin (CIPRO) 500 MG tablet; Take 1 tablet (500 mg) by mouth 2 times daily  Dispense: 14 tablet; Refill: 0    3. Pyelonephritis  - cefTRIAXone (ROCEPHIN) 1 GM vial; Inject 2 g (2,000 mg) into the muscle once for 1 dose  Dispense: 20 mL; Refill: 0  - ciprofloxacin (CIPRO) 500 MG tablet; Take 1 tablet (500 mg) by mouth 2 times daily  Dispense: 14 tablet; Refill: 0      Increase fluids  Keep temp down, Tylenol OTC  Cranberry juice   To ER with increased symptoms      4. Encounter for screening mammogram for breast cancer  - [facility] SCREENING DIGITAL BILATERAL ([place]); Future    5. Tobacco abuse  - Tobacco Cessation - Order to Satisfy Health Maintenance    6. Tobacco abuse counseling  - See attached                          Pyelonephritis (Kidney Infection)             What is pyelonephritis?   Pyelonephritis is bacterial infection of one or both kidneys. Kidney infection can be serious because it can permanently damage the kidneys. Also, the infection may enter the bloodstream and cause life-threatening infection. Another problem is that it can cause pregnant women to go into labor too early (premature labor).   Kidney infections are much more common in women than men.   How does it occur?   Kidney  infections usually start in the bladder or the urethra, which is the tube that empties urine from the bladder. Bladder infections can happen when bacteria travel from the vagina or rectal area (anus) into the urethra and bladder. The bacteria travel up to the kidneys from the bladder. In men, the infection might also start as a prostate infection. Bacteria can also spread to the kidneys from an infection somewhere else in the body.   The urinary system is a common site of birth defects. If your urinary system is abnormal, you have a greater risk that a bladder infection will spread to the kidneys.   Kidney stones increase the risk of infection.   What are the symptoms?   The symptoms range from mild to severe. They may include:   fever   chills or sweats   abdominal or back pain   loss of appetite   nausea or vomiting   problems with urination, such as pain when you urinate or a frequent urge to urinate.   Sometimes it is hard to know whether urinary symptoms are caused by an infection of the lower urinary tract (for example, the bladder) or by a kidney infection. The symptoms should not be ignored. If you have symptoms, see your healthcare provider right away. A bladder infection can quickly lead to pyelonephritis. The infection can spread to the bloodstream. Do not wait to get treatment.   How is it diagnosed?   Your healthcare provider will review your medical history, looking especially for current or recent lower urinary tract infection. You will also have a physical exam. Your provider will check for pain in the kidney area. A sample of your urine will be tested in the lab.   How is it treated?   Antibiotic medicine is the main treatment for a kidney infection. You may be able to start your treatment at home. You may be able to keep being treated at home if your symptoms seem to be getting better within 24 hours after you start taking the medicine. If you are more seriously ill, or if the medicine does not  seem to be working well, you may need to be in the hospital. There you can be given fluids and medicine through an IV.   Usually you will need to take an antibiotic for at least 7 days. All bacteria must be killed to prevent kidney damage and to keep the infection from coming back.   Your antibiotic may need to be changed after the first day or two of treatment if lab tests of the bacteria in your urine show a different antibiotic will work better. Your healthcare provider will tell you if this is the case.   If you have an abnormal urinary tract or stones in your urinary tract, you may need surgery.   How long do the effects last?   How long it takes to get better depends on how severe your symptoms are when you start treatment. In mild cases, you will start feeling better in the first 1 or 2 days. It may take several days if you have a more severe infection.   With proper treatment there are usually no complications of pyelonephritis. If you keep having symptoms, or your symptoms come back soon after you finish your treatment, you may need more tests. Your healthcare provider will check for another cause of the infection or to see if another antibiotic is needed.   How can I take care of myself?   Follow the treatment plan your healthcare provider prescribes.   If you have a fever:   Ask your healthcare provider if you should take aspirin or acetaminophen for the fever. Check with your healthcare provider before you give any medicine that contains aspirin or salicylates to a child or teen. This includes medicines like baby aspirin, some cold medicines, and Pepto Bismol. Children and teens who take aspirin are at risk for a serious illness called Reye's syndrome.   Keep a daily record of your temperature.   Notice whether your symptoms get better within 1 to 2 days after you start taking the antibiotic.   Drink plenty of fluids.   Take your medicines exactly as your provider tells you to.   Tell your provider right  away about any symptoms that get worse or come back.   Follow your healthcare provider's directions for a follow-up urine test. Your provider may want to test your urine soon after you finish taking the antibiotic.   Call your provider if you have new or worsening:   fever   chills or sweats   abdominal or back pain   nausea or vomiting   problems with urination, such as pain when you urinate or a frequent urge to urinate   blood in your urine.   How can I help prevent pyelonephritis?   The best way to prevent kidney infections is to try to prevent bladder infections.   Women can take the following steps to try to prevent bladder infections:   Drink plenty of water and other beverages.   Practice good hygiene when you use the toilet, such as wiping from front to back.   Avoid using irritating cosmetics or chemicals in the area of the vagina and urethra, such as strong soaps and scented sanitary napkins or panty liners.   Urinate to empty your bladder after you have sex.   Men can take the following steps to try to prevent bladder infections:   Drink plenty of water and other beverages.   Practice good genital hygiene. If you are a man who has not been circumcised, good hygiene includes gently pulling back the foreskin to wash the tip of the penis every time you bathe or shower.   Urinate to empty your bladder after you have sex.          Published by [employer].  This content is reviewed periodically and is subject to change as new health information becomes available. The information is intended to inform and educate and is not a replacement for medical evaluation, advice, diagnosis or treatment by a healthcare professional.   Developed by [employer].   ? 2010 [employer] and/or its affiliates. All Rights Reserved.   Copyright   [employer] 2011          Itzel Phillip NP  [facility]

## 2018-10-15 NOTE — PROGRESS NOTES
Normal, please notify patient  UC pending    Itzel Phillip [facility]  [phone]

## 2018-10-15 NOTE — PATIENT INSTRUCTIONS
Results for orders placed or performed in visit on 10/15/18 (from the past 24 hour(s))   *UA reflex to Microscopic and Culture - [facility]   Result Value Ref Range    Color Urine Yellow     Appearance Urine Slightly Cloudy     Glucose Urine Negative NEG^Negative mg/dL    Bilirubin Urine Small (A) NEG^Negative    Ketones Urine Trace (A) NEG^Negative mg/dL    Specific Gravity Urine >1.030 1.003 - 1.035    Blood Urine Large (A) NEG^Negative    pH Urine 6.0 5.0 - 7.0 pH    Protein Albumin Urine 100 (A) NEG^Negative mg/dL    Urobilinogen Urine 1.0 0.2 - 1.0 EU/dL    Nitrite Urine Positive (A) NEG^Negative    Leukocyte Esterase Urine Trace (A) NEG^Negative    Source Midstream Urine    Urine Microscopic   Result Value Ref Range    WBC Urine 5-10 (A) OTO5^0 - 5 /HPF    RBC Urine 10-25 (A) OTO2^O - 2 /HPF    Bacteria Urine Many (A) NEG^Negative /HPF             ASSESSMENT/PLAN:     1. Dysuria  - UA reflex to Microscopic and Culture - [facility]  - Urine Microscopic    2. Abnormal findings on microbiological examination of urine  - Urine Culture Aerobic Bacterial  - CBC with platelets differential  - Renal panel  - cefTRIAXone (ROCEPHIN) 1 GM vial; Inject 2 g (2,000 mg) into the muscle once for 1 dose  Dispense: 20 mL; Refill: 0  - ciprofloxacin (CIPRO) 500 MG tablet; Take 1 tablet (500 mg) by mouth 2 times daily  Dispense: 14 tablet; Refill: 0    3. Pyelonephritis  - cefTRIAXone (ROCEPHIN) 1 GM vial; Inject 2 g (2,000 mg) into the muscle once for 1 dose  Dispense: 20 mL; Refill: 0  - ciprofloxacin (CIPRO) 500 MG tablet; Take 1 tablet (500 mg) by mouth 2 times daily  Dispense: 14 tablet; Refill: 0      Increase fluids  Keep temp down, Tylenol OTC  Cranberry juice   To ER with increased symptoms      4. Encounter for screening mammogram for breast cancer  - MA SCREENING DIGITAL BILATERAL ([place]); Future    5. Tobacco abuse  - Tobacco Cessation - Order to Satisfy Health Maintenance    6. Tobacco abuse  counseling  - See attached                          Pyelonephritis (Kidney Infection)             What is pyelonephritis?   Pyelonephritis is bacterial infection of one or both kidneys. Kidney infection can be serious because it can permanently damage the kidneys. Also, the infection may enter the bloodstream and cause life-threatening infection. Another problem is that it can cause pregnant women to go into labor too early (premature labor).   Kidney infections are much more common in women than men.   How does it occur?   Kidney infections usually start in the bladder or the urethra, which is the tube that empties urine from the bladder. Bladder infections can happen when bacteria travel from the vagina or rectal area (anus) into the urethra and bladder. The bacteria travel up to the kidneys from the bladder. In men, the infection might also start as a prostate infection. Bacteria can also spread to the kidneys from an infection somewhere else in the body.   The urinary system is a common site of birth defects. If your urinary system is abnormal, you have a greater risk that a bladder infection will spread to the kidneys.   Kidney stones increase the risk of infection.   What are the symptoms?   The symptoms range from mild to severe. They may include:   fever   chills or sweats   abdominal or back pain   loss of appetite   nausea or vomiting   problems with urination, such as pain when you urinate or a frequent urge to urinate.   Sometimes it is hard to know whether urinary symptoms are caused by an infection of the lower urinary tract (for example, the bladder) or by a kidney infection. The symptoms should not be ignored. If you have symptoms, see your healthcare provider right away. A bladder infection can quickly lead to pyelonephritis. The infection can spread to the bloodstream. Do not wait to get treatment.   How is it diagnosed?   Your healthcare provider will review your medical history, looking  especially for current or recent lower urinary tract infection. You will also have a physical exam. Your provider will check for pain in the kidney area. A sample of your urine will be tested in the lab.   How is it treated?   Antibiotic medicine is the main treatment for a kidney infection. You may be able to start your treatment at home. You may be able to keep being treated at home if your symptoms seem to be getting better within 24 hours after you start taking the medicine. If you are more seriously ill, or if the medicine does not seem to be working well, you may need to be in the hospital. There you can be given fluids and medicine through an IV.   Usually you will need to take an antibiotic for at least 7 days. All bacteria must be killed to prevent kidney damage and to keep the infection from coming back.   Your antibiotic may need to be changed after the first day or two of treatment if lab tests of the bacteria in your urine show a different antibiotic will work better. Your healthcare provider will tell you if this is the case.   If you have an abnormal urinary tract or stones in your urinary tract, you may need surgery.   How long do the effects last?   How long it takes to get better depends on how severe your symptoms are when you start treatment. In mild cases, you will start feeling better in the first 1 or 2 days. It may take several days if you have a more severe infection.   With proper treatment there are usually no complications of pyelonephritis. If you keep having symptoms, or your symptoms come back soon after you finish your treatment, you may need more tests. Your healthcare provider will check for another cause of the infection or to see if another antibiotic is needed.   How can I take care of myself?   Follow the treatment plan your healthcare provider prescribes.   If you have a fever:   Ask your healthcare provider if you should take aspirin or acetaminophen for the fever. Check with  your healthcare provider before you give any medicine that contains aspirin or salicylates to a child or teen. This includes medicines like baby aspirin, some cold medicines, and Pepto Bismol. Children and teens who take aspirin are at risk for a serious illness called Reye's syndrome.   Keep a daily record of your temperature.   Notice whether your symptoms get better within 1 to 2 days after you start taking the antibiotic.   Drink plenty of fluids.   Take your medicines exactly as your provider tells you to.   Tell your provider right away about any symptoms that get worse or come back.   Follow your healthcare provider's directions for a follow-up urine test. Your provider may want to test your urine soon after you finish taking the antibiotic.   Call your provider if you have new or worsening:   fever   chills or sweats   abdominal or back pain   nausea or vomiting   problems with urination, such as pain when you urinate or a frequent urge to urinate   blood in your urine.   How can I help prevent pyelonephritis?   The best way to prevent kidney infections is to try to prevent bladder infections.   Women can take the following steps to try to prevent bladder infections:   Drink plenty of water and other beverages.   Practice good hygiene when you use the toilet, such as wiping from front to back.   Avoid using irritating cosmetics or chemicals in the area of the vagina and urethra, such as strong soaps and scented sanitary napkins or panty liners.   Urinate to empty your bladder after you have sex.   Men can take the following steps to try to prevent bladder infections:   Drink plenty of water and other beverages.   Practice good genital hygiene. If you are a man who has not been circumcised, good hygiene includes gently pulling back the foreskin to wash the tip of the penis every time you bathe or shower.   Urinate to empty your bladder after you have sex.          Published by [employer].  This content is  reviewed periodically and is subject to change as new health information becomes available. The information is intended to inform and educate and is not a replacement for medical evaluation, advice, diagnosis or treatment by a healthcare professional.   Developed by [employer].   ? 2010 [employer] and/or its affiliates. All Rights Reserved.   Copyright   Clinical Reference Systems 2011          Itzel Phillip NP  [facility]      HOW TO QUIT SMOKING  Smoking is one of the hardest habits to break. About half of all those who have ever smoked have been able to quit, and most of those (about 70%) who still smoke want to quit. Here are some of the best ways to stop smoking.     KEEP TRYING:  It takes most smokers about 8 tries before they are finally able to fully quit. So, the more often you try and fail, the better your chance of quitting the next time! So, don't give up!    GO COLD TURKEY:  Most ex-smokers quit cold turkey. Trying to cut back gradually doesn't seem to work as well, perhaps because it continues the smoking habit. Also, it is possible to fool yourself by inhaling more while smoking fewer cigarettes. This results in the same amount of nicotine in your body!    GET SUPPORT:  Support programs can make an important difference, especially for the heavy smoker. These groups offer lectures, methods to change your behavior and peer support. Call the free national Quitline for more information. 800-QUIT-NOW (371-337-2948). Low-cost or free programs are offered by many hospitals, local chapters of the American Lung Association (783-156-5787) and the American Cancer Society (760-436-5880). Support at home is important too. Non-smokers can help by offering praise and encouragement. If the smoker fails to quit, encourage them to try again!    OVER-THE-COUNTER MEDICINES:  For those who can't quit on their own, Nicotine Replacement Therapy (NRT) may make quitting much easier. Certain aids such as  the nicotine patch, gum and lozenge are available without a prescription. However, it is best to use these under the guidance of your doctor. The skin patch provides a steady supply of nicotine to the body. Nicotine gum and lozenge gives temporary bursts of low levels of nicotine. Both methods take the edge off the craving for cigarettes. WARNING: If you feel symptoms of nicotine overdose, such as nausea, vomiting, dizziness, weakness, or fast heartbeat, stop using these and see your doctor.    PRESCRIPTION MEDICINES:  After evaluating your smoking patterns and prior attempts at quitting, your doctor may offer a prescription medicine such as bupropion (Zyban, Wellbutrin), varenicline (Chantix, Champix), a niocotine inhaler or nasal spray. Each has its unique advantage and side effects which your doctor can review with you.    HEALTH BENEFITS OF QUITTING:  The benefits of quitting start right away and keep improving the longer you go without smokin minutes: blood pressure and pulse return to normal  8 hours: oxygen levels return to normal  2 days: ability to smell and taste begins to improve as damaged nerves start to regrow  2-3 weeks: circulation and lung function improves  1-9 months: decreased cough, congestion and shortness of breath; less tired  1 year: risk of heart attack decreases by half  5 years: risk of lung cancer decreases by half; risk of stroke becomes the same as a non-smoker  For information about how to quit smoking, visit the following links:  National Cancer [place] ,   Clearing the Air, Quit Smoking Today   - an online booklet. http://www.smokefree.gov/pubs/clearing_the_air.pdf  Smokefree.gov http://smokefree.gov/  QuitNet http://www.quitnet.com/    5670-8879 [employer], [address] [place] [address], [place] [address]. All rights reserved. This information is not intended as a substitute for professional medical care. Always follow your healthcare professional's instructions.    The  Benefits of Living Smoke Free  What do you want to gain from quitting? Check off some reasons to quit.  Health Benefits  ___ Reduce my risk of lung cancer, heart disease, chronic lung disease  ___ Have fewer wrinkles and softer skin  ___ Improve my sense of taste and smell  ___ For pregnant women--reduce the risk of having a miscarriage, stillbirth, premature birth, or low-birth-weight baby  Personal Benefits  ___ Feel more in control of my life  ___ Have better-smelling hair, breath, clothes, home, and car  ___ Save time by not having to take smoke breaks, buy cigarettes, or hunt for a light  ___ Have whiter teeth  Family Benefits  ___ Reduce my children s respiratory tract infections  ___ Set a good example for my children  ___ Reduce my family s cancer risk  Financial Benefits  ___ Save hundreds of dollars each year that would be spent on cigarettes  ___ Save money on medical bills  ___ Save on life, health, and car insurance premiums    Those Dollars Add Up!  Cigarettes are expensive, and getting more expensive all the time. Do you realize how much money you are spending on cigarettes per year? What is the average amount you spend on a pack of cigarettes? What is the average number of packs that you smoke per day? Using your answers to these questions, fill in this formula to help you find out:  ($ _____ per pack) ×  ( _____ number of packs per day) × (365 days) =  $ _____ yearly cost of smoking  Besides tobacco, there are other costs, including extra cleaning bills and replacement costs for clothing and furniture; medical expenses for smoking-related illnesses; and higher health, life, and car insurance premiums.    Cigars and Pipes Count Too!  Cigars and pipes are also dangerous. So are smokeless (chewing) tobacco and snuff. All of these products contain nicotine, a highly addictive substance that has harmful effects on your body. Quitting smoking means giving up all tobacco products.      2263-3556 Hunter  [employer], [address] [place] [address], [place] [address]. All rights reserved. This information is not intended as a substitute for professional medical care. Always follow your healthcare professional's instructions.

## 2018-10-16 ASSESSMENT — PATIENT HEALTH QUESTIONNAIRE - PHQ9: SUM OF ALL RESPONSES TO PHQ QUESTIONS 1-9: 0

## 2018-10-16 ASSESSMENT — ANXIETY QUESTIONNAIRES: GAD7 TOTAL SCORE: 1

## 2018-10-17 ENCOUNTER — TELEPHONE (OUTPATIENT)
Dept: FAMILY MEDICINE | Facility: OTHER | Age: 58
End: 2018-10-17

## 2018-10-17 DIAGNOSIS — B37.31 CANDIDIASIS OF VAGINA: ICD-10-CM

## 2018-10-17 DIAGNOSIS — N10 ACUTE PYELONEPHRITIS: Primary | ICD-10-CM

## 2018-10-17 DIAGNOSIS — N10 ACUTE PYELONEPHRITIS: ICD-10-CM

## 2018-10-17 LAB
BACTERIA SPEC CULT: ABNORMAL
BASOPHILS # BLD AUTO: 0 10E9/L (ref 0–0.2)
BASOPHILS NFR BLD AUTO: 0.5 %
DIFFERENTIAL METHOD BLD: NORMAL
EOSINOPHIL # BLD AUTO: 0.2 10E9/L (ref 0–0.7)
EOSINOPHIL NFR BLD AUTO: 2.4 %
ERYTHROCYTE [DISTWIDTH] IN BLOOD BY AUTOMATED COUNT: 12.6 % (ref 10–15)
HCT VFR BLD AUTO: 37.5 % (ref 35–47)
HGB BLD-MCNC: 12.8 G/DL (ref 11.7–15.7)
LYMPHOCYTES # BLD AUTO: 2.6 10E9/L (ref 0.8–5.3)
LYMPHOCYTES NFR BLD AUTO: 31.1 %
MCH RBC QN AUTO: 31.4 PG (ref 26.5–33)
MCHC RBC AUTO-ENTMCNC: 34.1 G/DL (ref 31.5–36.5)
MCV RBC AUTO: 92 FL (ref 78–100)
MONOCYTES # BLD AUTO: 1 10E9/L (ref 0–1.3)
MONOCYTES NFR BLD AUTO: 11.8 %
NEUTROPHILS # BLD AUTO: 4.4 10E9/L (ref 1.6–8.3)
NEUTROPHILS NFR BLD AUTO: 54.2 %
PLATELET # BLD AUTO: 237 10E9/L (ref 150–450)
RBC # BLD AUTO: 4.08 10E12/L (ref 3.8–5.2)
SPECIMEN SOURCE: ABNORMAL
WBC # BLD AUTO: 8.2 10E9/L (ref 4–11)

## 2018-10-17 PROCEDURE — 36415 COLL VENOUS BLD VENIPUNCTURE: CPT | Performed by: NURSE PRACTITIONER

## 2018-10-17 PROCEDURE — 85025 COMPLETE CBC W/AUTO DIFF WBC: CPT | Performed by: NURSE PRACTITIONER

## 2018-10-17 RX ORDER — FLUCONAZOLE 150 MG/1
TABLET ORAL
Qty: 3 TABLET | Refills: 0 | Status: SHIPPED | OUTPATIENT
Start: 2018-10-17 | End: 2018-10-22

## 2018-10-17 NOTE — PROGRESS NOTES
Called to check on pt, was in a few days ago with pyelonephritis, left message to call and let us know how she's feeling and if needs anything further.

## 2018-10-18 ENCOUNTER — TELEPHONE (OUTPATIENT)
Dept: FAMILY MEDICINE | Facility: OTHER | Age: 58
End: 2018-10-18

## 2018-10-18 NOTE — PROGRESS NOTES
Checked on pt , was in early this week with kidney infection, reports doing better, WBC is back to normal, will call if needs anything  further

## 2018-10-21 ENCOUNTER — HEALTH MAINTENANCE LETTER (OUTPATIENT)
Age: 58
End: 2018-10-21

## 2018-10-22 ENCOUNTER — TELEPHONE (OUTPATIENT)
Dept: FAMILY MEDICINE | Facility: OTHER | Age: 58
End: 2018-10-22

## 2018-10-22 DIAGNOSIS — B37.31 CANDIDIASIS OF VAGINA: ICD-10-CM

## 2018-10-22 RX ORDER — FLUCONAZOLE 150 MG/1
TABLET ORAL
Qty: 3 TABLET | Refills: 0 | COMMUNITY
Start: 2018-10-22 | End: 2019-03-13

## 2018-10-22 NOTE — TELEPHONE ENCOUNTER
Call in ad additional 3 days of diflucan  Will need wet prep repeated if still symptomatic    Itzel Phillip [facility]  [phone]

## 2018-10-22 NOTE — TELEPHONE ENCOUNTER
Called to [facility] and pt notified to follow-up if no better, took previous dose before antibiotic was finished.

## 2018-10-22 NOTE — TELEPHONE ENCOUNTER
Yeast infection should be resolved.    [facility]  [phone]

## 2019-03-13 ENCOUNTER — OFFICE VISIT (OUTPATIENT)
Dept: FAMILY MEDICINE | Facility: OTHER | Age: 59
End: 2019-03-13
Attending: NURSE PRACTITIONER
Payer: COMMERCIAL

## 2019-03-13 VITALS
OXYGEN SATURATION: 98 % | BODY MASS INDEX: 25.3 KG/M2 | RESPIRATION RATE: 14 BRPM | SYSTOLIC BLOOD PRESSURE: 126 MMHG | TEMPERATURE: 97.4 F | HEART RATE: 64 BPM | WEIGHT: 134 LBS | HEIGHT: 61 IN | DIASTOLIC BLOOD PRESSURE: 72 MMHG

## 2019-03-13 DIAGNOSIS — R31.0 GROSS HEMATURIA: ICD-10-CM

## 2019-03-13 DIAGNOSIS — Z12.31 ENCOUNTER FOR SCREENING MAMMOGRAM FOR BREAST CANCER: ICD-10-CM

## 2019-03-13 DIAGNOSIS — C67.8 MALIGNANT NEOPLASM OF OVERLAPPING SITES OF BLADDER (H): Primary | ICD-10-CM

## 2019-03-13 LAB
ALBUMIN SERPL-MCNC: 4.4 G/DL (ref 3.4–5)
ALBUMIN UR-MCNC: NEGATIVE MG/DL
ALP SERPL-CCNC: 117 U/L (ref 40–150)
ALT SERPL W P-5'-P-CCNC: 33 U/L (ref 0–50)
ANION GAP SERPL CALCULATED.3IONS-SCNC: 7 MMOL/L (ref 3–14)
APPEARANCE UR: CLEAR
AST SERPL W P-5'-P-CCNC: 20 U/L (ref 0–45)
BASOPHILS # BLD AUTO: 0.1 10E9/L (ref 0–0.2)
BASOPHILS NFR BLD AUTO: 0.7 %
BILIRUB SERPL-MCNC: 0.6 MG/DL (ref 0.2–1.3)
BILIRUB UR QL STRIP: NEGATIVE
BUN SERPL-MCNC: 14 MG/DL (ref 7–30)
CALCIUM SERPL-MCNC: 9 MG/DL (ref 8.5–10.1)
CHLORIDE SERPL-SCNC: 110 MMOL/L (ref 94–109)
CO2 SERPL-SCNC: 24 MMOL/L (ref 20–32)
COLOR UR AUTO: YELLOW
CREAT SERPL-MCNC: 0.71 MG/DL (ref 0.52–1.04)
DIFFERENTIAL METHOD BLD: NORMAL
EOSINOPHIL # BLD AUTO: 0.2 10E9/L (ref 0–0.7)
EOSINOPHIL NFR BLD AUTO: 2 %
ERYTHROCYTE [DISTWIDTH] IN BLOOD BY AUTOMATED COUNT: 12.8 % (ref 10–15)
GFR SERPL CREATININE-BSD FRML MDRD: >90 ML/MIN/{1.73_M2}
GLUCOSE SERPL-MCNC: 100 MG/DL (ref 70–99)
GLUCOSE UR STRIP-MCNC: NEGATIVE MG/DL
HCT VFR BLD AUTO: 41.3 % (ref 35–47)
HGB BLD-MCNC: 13.9 G/DL (ref 11.7–15.7)
HGB UR QL STRIP: NEGATIVE
KETONES UR STRIP-MCNC: NEGATIVE MG/DL
LEUKOCYTE ESTERASE UR QL STRIP: NEGATIVE
LYMPHOCYTES # BLD AUTO: 2.8 10E9/L (ref 0.8–5.3)
LYMPHOCYTES NFR BLD AUTO: 36.5 %
MCH RBC QN AUTO: 31.7 PG (ref 26.5–33)
MCHC RBC AUTO-ENTMCNC: 33.7 G/DL (ref 31.5–36.5)
MCV RBC AUTO: 94 FL (ref 78–100)
MONOCYTES # BLD AUTO: 0.7 10E9/L (ref 0–1.3)
MONOCYTES NFR BLD AUTO: 8.8 %
NEUTROPHILS # BLD AUTO: 4 10E9/L (ref 1.6–8.3)
NEUTROPHILS NFR BLD AUTO: 52 %
NITRATE UR QL: NEGATIVE
PH UR STRIP: 6.5 PH (ref 5–7)
PLATELET # BLD AUTO: 225 10E9/L (ref 150–450)
POTASSIUM SERPL-SCNC: 4.1 MMOL/L (ref 3.4–5.3)
PROT SERPL-MCNC: 7.4 G/DL (ref 6.8–8.8)
RBC # BLD AUTO: 4.38 10E12/L (ref 3.8–5.2)
SODIUM SERPL-SCNC: 141 MMOL/L (ref 133–144)
SOURCE: NORMAL
SP GR UR STRIP: 1.02 (ref 1–1.03)
UROBILINOGEN UR STRIP-ACNC: 0.2 EU/DL (ref 0.2–1)
WBC # BLD AUTO: 7.6 10E9/L (ref 4–11)

## 2019-03-13 PROCEDURE — 85025 COMPLETE CBC W/AUTO DIFF WBC: CPT | Performed by: NURSE PRACTITIONER

## 2019-03-13 PROCEDURE — 36415 COLL VENOUS BLD VENIPUNCTURE: CPT | Performed by: NURSE PRACTITIONER

## 2019-03-13 PROCEDURE — 80053 COMPREHEN METABOLIC PANEL: CPT | Performed by: NURSE PRACTITIONER

## 2019-03-13 PROCEDURE — 81003 URINALYSIS AUTO W/O SCOPE: CPT | Performed by: NURSE PRACTITIONER

## 2019-03-13 PROCEDURE — 99213 OFFICE O/P EST LOW 20 MIN: CPT | Performed by: NURSE PRACTITIONER

## 2019-03-13 ASSESSMENT — PAIN SCALES - GENERAL: PAINLEVEL: NO PAIN (0)

## 2019-03-13 ASSESSMENT — MIFFLIN-ST. JEOR: SCORE: 1125.2

## 2019-03-13 NOTE — Clinical Note
I am referring this pt back to you as she is overdue for Follow-up, and is experiencing hematuria.CBC, UA, CMP ordered.Date of Initial Diagnosis: 2/15Stage of Initial Diagnosis: TaGrade at Initial Diagnosis: lowSite of First Diagnosis: bladderAny Recurrence? 12/2015 (right UO and left trigone)Intravesical Treatments: TURBT and stentLast cystoscopy 12/12/18Itzel [phone]-[phone]

## 2019-03-13 NOTE — PROGRESS NOTES
SUBJECTIVE:   Kait Augustin is a 58 year old female who presents to clinic today for the following health issues:      Blood in urine - history of bladder cancer diagnosed in 2015, follows with Dr. Couch, due for FU    Duration: 1 month ago and 1 day last week    Description (location/character/radiation): blood in urine    Intensity:  mild    Accompanying signs and symptoms: none    History (similar episodes/previous evaluation): history of bladder cancer, missed her annual appointment with her oncologist    Precipitating or alleviating factors: None    Therapies tried and outcome: None         Date of Initial Diagnosis: 2/15  Stage of Initial Diagnosis: Ta  Grade at Initial Diagnosis: low  Site of First Diagnosis: bladder  Any Recurrence? 12/2015 (right UO and left trigone)  Intravesical Treatments: TURBT and stent  Last cystoscopy 12/12/18      Insurance will not cover a colonoscopy, so she declines      Problem list and histories reviewed & adjusted, as indicated.  Additional history: as documented    Patient Active Problem List   Diagnosis     H/O renal calculi     Malignant neoplasm of overlapping sites of bladder (H)     ACP (advance care planning)     Past Surgical History:   Procedure Laterality Date     C LIGATE FALLOPIAN TUBE  1996     CYSTOSCOPY, RETROGRADES, INSERT STENT URETER(S), COMBINED Right 12/8/2015    Procedure: COMBINED CYSTOSCOPY, RETROGRADES, INSERT STENT URETER(S);  Surgeon: Efren Couch MD;  Location: [facility]     CYSTOSCOPY, TRANSURETHRAL RESECTION (TUR) TUMOR BLADDER, COMBINED N/A 2/10/2015    Procedure: COMBINED CYSTOSCOPY, TRANSURETHRAL RESECTION (TUR) TUMOR BLADDER;  Surgeon: Efren Couch MD;  Location: [facility]     CYSTOSCOPY, TRANSURETHRAL RESECTION (TUR) TUMOR BLADDER, COMBINED N/A 12/8/2015    Procedure: COMBINED CYSTOSCOPY, TRANSURETHRAL RESECTION (TUR) TUMOR BLADDER;  Surgeon: Efren Couch MD;  Location: [facility]       Social History  "    Tobacco Use     Smoking status: Former Smoker     Packs/day: 1.00     Years: 45.00     Pack years: 45.00     Types: Cigarettes     Smokeless tobacco: Never Used   Substance Use Topics     Alcohol use: Yes     Comment: rare     Family History   Problem Relation Age of Onset     C.A.D. Father         dx late 60's, CABG, angioplasty     Cancer Father         Lung, dx age 80 h/o tobacco use     Heart Disease Mother      Thyroid Disease Mother      Lipids Sister          No current outpatient medications on file.     Allergies   Allergen Reactions     No Known Allergies      Recent Labs   Lab Test 10/15/18  1134 12/01/15  1544  11/17/14  1052   LDL  --   --   --  180*   HDL  --   --   --  65   TRIG  --   --   --  128   CR 0.72 0.70   < > 0.71   GFRESTIMATED 83 87   < > 86   GFRESTBLACK >90 >90  African American GFR Calc     < > >90   GFR Calc     POTASSIUM 3.7 3.8   < > 3.9   TSH  --   --   --  1.00    < > = values in this interval not displayed.      BP Readings from Last 3 Encounters:   03/13/19 126/72   10/15/18 108/62   12/12/17 110/70    Wt Readings from Last 3 Encounters:   03/13/19 60.8 kg (134 lb)   10/15/18 56.2 kg (124 lb)   12/12/17 57.6 kg (127 lb)                  Labs reviewed in EPIC    Reviewed and updated as needed this visit by clinical staff  Tobacco  Allergies  Meds       Reviewed and updated as needed this visit by Provider         ROS:  Constitutional, HEENT, cardiovascular, pulmonary, gi and gu systems are negative, except as otherwise noted.    OBJECTIVE:     /72 (BP Location: Left arm, Patient Position: Sitting, Cuff Size: Adult Regular)   Pulse 64   Temp 97.4  F (36.3  C) (Tympanic)   Resp 14   Ht 1.549 m (5' 1\")   Wt 60.8 kg (134 lb)   LMP 06/18/2005   SpO2 98%   BMI 25.32 kg/m    Body mass index is 25.32 kg/m .     GENERAL: healthy, alert and no distress  EYES: Eyes grossly normal to inspection, PERRL and conjunctivae and sclerae normal  HENT: ear canals " and TM's normal, nose and mouth without ulcers or lesions  NECK: no adenopathy, no asymmetry, masses, or scars and thyroid normal to palpation  RESP: lungs clear to auscultation - no rales, rhonchi or wheezes  CV: regular rate and rhythm, normal S1 S2, no S3 or S4, no murmur, click or rub, no peripheral edema and peripheral pulses strong  ABDOMEN: soft, nontender, no hepatosplenomegaly, no masses and bowel sounds normal  MS: no gross musculoskeletal defects noted, no edema  SKIN: no suspicious lesions or rashes  PSYCH: mentation appears normal, affect normal/bright        ASSESSMENT/PLAN:     1. Malignant neoplasm of overlapping sites of bladder (H)  - UA reflex to Microscopic and Culture - [facility]  - Comprehensive metabolic panel (BMP + Alb, Alk Phos, ALT, AST, Total. Bili, TP)  - UROLOGY ADULT REFERRAL  - CBC with platelets differential    2. Encounter for screening mammogram for breast cancer  - [place] Screening Digital Bilateral; Future    3. Gross hematuria  - CBC with platelets differential    Message sent to Dr. Couch to update him on status      Itzel Phillip NP  [facility] [place] [facility]

## 2019-03-13 NOTE — PATIENT INSTRUCTIONS
ASSESSMENT/PLAN:     1. Malignant neoplasm of overlapping sites of bladder (H)  - UA reflex to Microscopic and Culture - [facility]  - Comprehensive metabolic panel (BMP + Alb, Alk Phos, ALT, AST, Total. Bili, TP)  - UROLOGY ADULT REFERRAL  - CBC with platelets differential    2. Encounter for screening mammogram for breast cancer  - MA Screening Digital Bilateral; Future    3. Gross hematuria  - CBC with platelets differential    Message sent to Dr. Couch to update him on status      Itzel Phillip NP  [facility] [place] [facility]

## 2019-03-13 NOTE — NURSING NOTE
"Chief Complaint   Patient presents with     Urinary Problem       Initial /72 (BP Location: Left arm, Patient Position: Sitting, Cuff Size: Adult Regular)   Pulse 64   Temp 97.4  F (36.3  C) (Tympanic)   Resp 14   Ht 1.549 m (5' 1\")   Wt 60.8 kg (134 lb)   LMP 06/18/2005   SpO2 98%   BMI 25.32 kg/m   Estimated body mass index is 25.32 kg/m  as calculated from the following:    Height as of this encounter: 1.549 m (5' 1\").    Weight as of this encounter: 60.8 kg (134 lb).  Medication Reconciliation: complete    Sandra Harris LPN    "

## 2019-03-21 ENCOUNTER — ANCILLARY PROCEDURE (OUTPATIENT)
Dept: MAMMOGRAPHY | Facility: OTHER | Age: 59
End: 2019-03-21
Attending: NURSE PRACTITIONER
Payer: COMMERCIAL

## 2019-03-21 DIAGNOSIS — Z12.31 ENCOUNTER FOR SCREENING MAMMOGRAM FOR BREAST CANCER: ICD-10-CM

## 2019-03-21 PROCEDURE — 77067 SCR MAMMO BI INCL CAD: CPT | Mod: TC

## 2019-04-24 ENCOUNTER — OFFICE VISIT (OUTPATIENT)
Dept: UROLOGY | Facility: OTHER | Age: 59
End: 2019-04-24
Attending: UROLOGY
Payer: COMMERCIAL

## 2019-04-24 VITALS
RESPIRATION RATE: 16 BRPM | SYSTOLIC BLOOD PRESSURE: 114 MMHG | WEIGHT: 134 LBS | DIASTOLIC BLOOD PRESSURE: 68 MMHG | TEMPERATURE: 97.8 F | HEIGHT: 61 IN | HEART RATE: 75 BPM | OXYGEN SATURATION: 96 % | BODY MASS INDEX: 25.3 KG/M2

## 2019-04-24 DIAGNOSIS — Z85.51 HISTORY OF BLADDER CANCER: ICD-10-CM

## 2019-04-24 DIAGNOSIS — D49.4 BLADDER TUMOR: Primary | ICD-10-CM

## 2019-04-24 PROCEDURE — 99214 OFFICE O/P EST MOD 30 MIN: CPT | Mod: 25 | Performed by: UROLOGY

## 2019-04-24 PROCEDURE — 52000 CYSTOURETHROSCOPY: CPT | Performed by: UROLOGY

## 2019-04-24 ASSESSMENT — MIFFLIN-ST. JEOR: SCORE: 1125.2

## 2019-04-24 ASSESSMENT — PAIN SCALES - GENERAL: PAINLEVEL: NO PAIN (0)

## 2019-04-24 NOTE — NURSING NOTE
Patient positioned in frog leg position prior to Delfino Walker MD prepping patient with chlorhexidine gluconate cleanser.    [place] Protocol    A. Pre-procedure verification complete Yes   1-relevant information / documentation available, reviewed and properly matched to the patient; 2-consent accurate and complete, 3-equipment and supplies available    B. Site marking complete N/A  Site marked if not in continuous attendance with patient    C. TIME OUT completed Yes  Time Out was conducted just prior to starting procedure to verify the eight required elements: 1-patient identity, 2-consent accurate and complete, 3-position, 4-correct side/site marked (if applicable), 5-procedure, 6-relevant images / results properly labeled and displayed (if applicable), 7-antibiotics / irrigation fluids (if applicable), 8-safety precautions.    After procedure perineum area rinsed. Discharge instructions reviewed with patient. Patient verbalized understanding of discharge instructions and discharged ambulatory.  Mariza Rubin..................4/24/2019  10:10 AM

## 2019-04-24 NOTE — NURSING NOTE
"Chief Complaint   Patient presents with     Cystoscopy     history of bladder cancer. Former [facility] patient.       Initial /68   Pulse 75   Temp 97.8  F (36.6  C) (Tympanic)   Resp 16   Ht 1.549 m (5' 1\")   Wt 60.8 kg (134 lb)   LMP 06/18/2005   SpO2 96%   BMI 25.32 kg/m   Estimated body mass index is 25.32 kg/m  as calculated from the following:    Height as of this encounter: 1.549 m (5' 1\").    Weight as of this encounter: 60.8 kg (134 lb).  Medication Reconciliation: complete   Review of Systems:    Weight loss:    No     Recent fever/chills:  No   Night sweats:   No  Current skin rash:  No   Recent hair loss:  No  Heat intolerance:  No   Cold intolerance:  No  Chest pain:   No   Palpitations:   No  Shortness of breath:  No   Wheezing:   No  Constipation:    yes   Diarrhea:   yes   Nausea:   No   Vomiting:   No   Kidney/side pain:  No   Back pain:   yes  Frequent headaches:  No   Dizziness:     No  Leg swelling:   No   Calf pain:    No    Parents, brothers or sisters with history of kidney cancer:   No  Parents, brothers or sisters with history of bladder cancer: No      Marilou Staley LPN    "

## 2019-04-24 NOTE — PATIENT INSTRUCTIONS

## 2019-04-24 NOTE — PROGRESS NOTES
I was asked to see this patient by Itzel Phillip NP and provide my opinion about the following: History of bladder cancer  Type of Visit  Consult    Chief Complaint  History of bladder cancer    HPI  Ms. Augustin is a 58 year old female who presents with history of bladder cancer to establish care.  She underwent TURBT 2/2015 and 12/2015.  She did receive postoperative mitomycin-C in December 2015.  Right ureteral orifice was involved.  She denies flank pain bilaterally.  Pathology was [facility].  She denies gross hematuria.  Her last surveillance cystoscopy was 1 year ago.      Past Medical History  She  has a past medical history of H/O renal calculi (11/17/2014), MIGRAINE HEADACHES (1996), Nicotine dependence (11/17/2014), PONV (postoperative nausea and vomiting), and Tobacco abuse (11/17/2014).  Patient Active Problem List   Diagnosis     H/O renal calculi     Malignant neoplasm of overlapping sites of bladder (H)     ACP (advance care planning)       Past Surgical History  She  has a past surgical history that includes LIGATE FALLOPIAN TUBE (1996); Cystoscopy, transurethral resection (TUR) tumor bladder, combined (N/A, 2/10/2015); Cystoscopy, transurethral resection (TUR) tumor bladder, combined (N/A, 12/8/2015); and Cystoscopy, retrogrades, insert stent ureter(s), combined (Right, 12/8/2015).    Medications  She currently has no medications in their medication list.    Allergies  Allergies   Allergen Reactions     No Known Allergies        Social History  She  reports that she has quit smoking. Her smoking use included cigarettes. She has a 45.00 pack-year smoking history. She has never used smokeless tobacco. She reports that she drinks alcohol. She reports that she does not use drugs.  No drug abuse.    Family History  Family History   Problem Relation Age of Onset     C.A.D. Father         dx late 60's, CABG, angioplasty     Cancer Father         Lung, dx age 80 h/o tobacco use     Heart Disease Mother       "Thyroid Disease Mother      Lipids Sister        Review of Systems  I personally reviewed the ROS with the patient.    Nursing Notes:   Marilou Staley LPN  4/24/2019  9:39 AM  Signed  Chief Complaint   Patient presents with     Cystoscopy     history of bladder cancer. Former [facility] patient.       Initial /68   Pulse 75   Temp 97.8  F (36.6  C) (Tympanic)   Resp 16   Ht 1.549 m (5' 1\")   Wt 60.8 kg (134 lb)   LMP 06/18/2005   SpO2 96%   BMI 25.32 kg/m    Estimated body mass index is 25.32 kg/m  as calculated from the following:    Height as of this encounter: 1.549 m (5' 1\").    Weight as of this encounter: 60.8 kg (134 lb).  Medication Reconciliation: complete   Review of Systems:    Weight loss:    No     Recent fever/chills:  No   Night sweats:   No  Current skin rash:  No   Recent hair loss:  No  Heat intolerance:  No   Cold intolerance:  No  Chest pain:   No   Palpitations:   No  Shortness of breath:  No   Wheezing:   No  Constipation:    yes   Diarrhea:   yes   Nausea:   No   Vomiting:   No   Kidney/side pain:  No   Back pain:   yes  Frequent headaches:  No   Dizziness:     No  Leg swelling:   No   Calf pain:    No    Parents, brothers or sisters with history of kidney cancer:   No  Parents, brothers or sisters with history of bladder cancer: No      Marilou Staley LPN      Physical Exam  Vitals:    04/24/19 0915   BP: 114/68   Pulse: 75   Resp: 16   Temp: 97.8  F (36.6  C)   TempSrc: Tympanic   SpO2: 96%   Weight: 60.8 kg (134 lb)   Height: 1.549 m (5' 1\")     Constitutional: NAD, WDWN.   Head: NCAT  Eyes: Conjunctivae normal  Cardiovascular: Regular rate.  Pulmonary/Chest: Respirations are even and non-labored bilaterally.  Abdominal: Soft. No distension, tenderness, masses or guarding. No CVA tenderness.  Neurological: A + O x 3.  Cranial Nerves II-XII grossly intact.  Extremities: THOR x 4, Warm. No clubbing.  No cyanosis.    Skin: Pink, warm and dry.  No rashes noted.  Psychiatric:  " Normal mood and affect  Genitourinary:  Nonpalpable bladder    ^^^^^^^^^^^^^^^^^^^^^^^^^^^^^^^^^^^^^^^^^^^^^^^^^^^^^^^^^^^^^^^^^^^^^^^^^^^^^^^^^^^  Preprocedure diagnosis  History of bladder cancer    Postprocedure diagnosis  History of bladder cancer  Recurrent bladder tumor    Procedure  Flexible Cystourethroscopy    Surgeon  Delfino Walker MD    Anesthesia  2% lidocaine jelly intraurethrally    Complications  None    Indications  58 year old female undergoing a flexible cystoscopy for the above mentioned indications.    Findings  Cystoscopic findings included a normal urethra.    The bladder appeared to be normal capacity.    There were no stones or foreign bodies.    Recurrent bladder tumor was identified near the right posterolateral wall near the right ureteral orifice.  The orifices were slit-shaped and in their normal location.    Procedure  The patient was placed in supine position and prepped and draped in sterile fashion with lidocaine jelly per urethra for anesthesia.    I passed a lubricated 14F flexible cystoscope through the urethra and into the bladder and the bladder was completely visualized.  The cystoscope was retroflexed and the bladder neck visualized.    The cystoscope was slowly withdrawn while visualizing the urethra and the procedure terminated.    The patient tolerated the procedure well.      ^^^^^^^^^^^^^^^^^^^^^^^^^^^^^^^^^^^^^^^^^^^^^^^^^^^^^^^^^^^^^^^^^^^^^^^^^^^^^^^^^^^    Labs  Results for LON KEYS (MRN [id number]) as of 4/24/2019 09:40   3/13/2019 11:26   Color Urine Yellow   Appearance Urine Clear   Glucose Urine Negative   Bilirubin Urine Negative   Ketones Urine Negative   Specific Gravity Urine 1.025   pH Urine 6.5   Protein Albumin Urine Negative   Urobilinogen Urine 0.2   Nitrite Urine Negative   Blood Urine Negative   Leukocyte Esterase Urine Negative   Source Midstream Urine       Imaging  I personally reviewed and interpreted the images and report.  CT a/p  "  1/2/2015  IMPRESSION:  1.  THERE IS AN APPARENT 1.5 CM IN DIAMETER ROUNDED FILLING DEFECT AT  THE POSTERIOR RIGHT SIDE OF THE BLADDER.  IT MOST LIKELY IS NOT DUE TO  ARTIFACT, BUT RATHER A BLADDER NEOPLASM.   CYSTOSCOPY IS RECOMMENDED.    2.  THERE IS A NON-OBSTRUCTING 2 MM IN DIAMETER MID-RENAL CALCULUS.    Assessment  Ms. Augustin is a 58 year old female with hematuria who underwent a cystoscopy today revealing recurrent bladder cancer.    The transurethral procedure was explained to the patient.    The tumor is then resected using a loop.   The specimen will be sent for pathological examination that usually takes about 3-5 days.     Possible complications and side effects were discussed with the patient, including but not limited to, infection or sepsis, bleeding, bladder perforation that may need for a more extensive surgery, injury to the ureter, injury to adjacent organs, incontinence, urinary retention and cardiovascular/pulmonary complications.    All patient's questions were answered and the patient was consented.     Plan  The patient was scheduled and consented for \"transurethral resection of bladder tumor and bilateral retrograde pyelograms, possible perioperative mitomycin-C\"   Muscle paralysis is requested during anesthesia due to the size/location of tumor.  Through PCP for perioperative medication management is appreciated  "

## 2019-05-01 ENCOUNTER — ANCILLARY PROCEDURE (OUTPATIENT)
Dept: GENERAL RADIOLOGY | Facility: OTHER | Age: 59
End: 2019-05-01
Attending: NURSE PRACTITIONER
Payer: COMMERCIAL

## 2019-05-01 ENCOUNTER — OFFICE VISIT (OUTPATIENT)
Dept: FAMILY MEDICINE | Facility: OTHER | Age: 59
End: 2019-05-01
Attending: NURSE PRACTITIONER
Payer: COMMERCIAL

## 2019-05-01 VITALS
DIASTOLIC BLOOD PRESSURE: 66 MMHG | HEIGHT: 61 IN | HEART RATE: 68 BPM | RESPIRATION RATE: 14 BRPM | BODY MASS INDEX: 25.49 KG/M2 | WEIGHT: 135 LBS | SYSTOLIC BLOOD PRESSURE: 92 MMHG | TEMPERATURE: 98.4 F

## 2019-05-01 DIAGNOSIS — Z01.818 PRE-OP EXAM: Primary | ICD-10-CM

## 2019-05-01 DIAGNOSIS — Z01.818 PRE-OP EXAM: ICD-10-CM

## 2019-05-01 DIAGNOSIS — C67.8 MALIGNANT NEOPLASM OF OVERLAPPING SITES OF BLADDER (H): ICD-10-CM

## 2019-05-01 DIAGNOSIS — Z01.818 PREOP GENERAL PHYSICAL EXAM: ICD-10-CM

## 2019-05-01 LAB
ALBUMIN SERPL-MCNC: 4.1 G/DL (ref 3.4–5)
ALBUMIN UR-MCNC: NEGATIVE MG/DL
ALP SERPL-CCNC: 106 U/L (ref 40–150)
ALT SERPL W P-5'-P-CCNC: 27 U/L (ref 0–50)
ANION GAP SERPL CALCULATED.3IONS-SCNC: 5 MMOL/L (ref 3–14)
APPEARANCE UR: CLEAR
AST SERPL W P-5'-P-CCNC: 11 U/L (ref 0–45)
BASOPHILS # BLD AUTO: 0.1 10E9/L (ref 0–0.2)
BASOPHILS NFR BLD AUTO: 0.7 %
BILIRUB SERPL-MCNC: 0.5 MG/DL (ref 0.2–1.3)
BILIRUB UR QL STRIP: NEGATIVE
BUN SERPL-MCNC: 13 MG/DL (ref 7–30)
CALCIUM SERPL-MCNC: 8.9 MG/DL (ref 8.5–10.1)
CHLORIDE SERPL-SCNC: 110 MMOL/L (ref 94–109)
CO2 SERPL-SCNC: 26 MMOL/L (ref 20–32)
COLOR UR AUTO: YELLOW
CREAT SERPL-MCNC: 0.7 MG/DL (ref 0.52–1.04)
DIFFERENTIAL METHOD BLD: NORMAL
EOSINOPHIL # BLD AUTO: 0.2 10E9/L (ref 0–0.7)
EOSINOPHIL NFR BLD AUTO: 2.1 %
ERYTHROCYTE [DISTWIDTH] IN BLOOD BY AUTOMATED COUNT: 13.1 % (ref 10–15)
GFR SERPL CREATININE-BSD FRML MDRD: >90 ML/MIN/{1.73_M2}
GLUCOSE SERPL-MCNC: 84 MG/DL (ref 70–99)
GLUCOSE UR STRIP-MCNC: NEGATIVE MG/DL
HCT VFR BLD AUTO: 43.2 % (ref 35–47)
HGB BLD-MCNC: 14.7 G/DL (ref 11.7–15.7)
HGB UR QL STRIP: NEGATIVE
KETONES UR STRIP-MCNC: NEGATIVE MG/DL
LEUKOCYTE ESTERASE UR QL STRIP: NEGATIVE
LYMPHOCYTES # BLD AUTO: 2.4 10E9/L (ref 0.8–5.3)
LYMPHOCYTES NFR BLD AUTO: 33.2 %
MCH RBC QN AUTO: 31.7 PG (ref 26.5–33)
MCHC RBC AUTO-ENTMCNC: 34 G/DL (ref 31.5–36.5)
MCV RBC AUTO: 93 FL (ref 78–100)
MONOCYTES # BLD AUTO: 0.7 10E9/L (ref 0–1.3)
MONOCYTES NFR BLD AUTO: 9 %
NEUTROPHILS # BLD AUTO: 4 10E9/L (ref 1.6–8.3)
NEUTROPHILS NFR BLD AUTO: 55 %
NITRATE UR QL: NEGATIVE
PH UR STRIP: 6.5 PH (ref 5–7)
PLATELET # BLD AUTO: 224 10E9/L (ref 150–450)
POTASSIUM SERPL-SCNC: 4.2 MMOL/L (ref 3.4–5.3)
PROT SERPL-MCNC: 7.3 G/DL (ref 6.8–8.8)
RBC # BLD AUTO: 4.63 10E12/L (ref 3.8–5.2)
SODIUM SERPL-SCNC: 141 MMOL/L (ref 133–144)
SOURCE: NORMAL
SP GR UR STRIP: 1.01 (ref 1–1.03)
UROBILINOGEN UR STRIP-ACNC: 0.2 EU/DL (ref 0.2–1)
WBC # BLD AUTO: 7.3 10E9/L (ref 4–11)

## 2019-05-01 PROCEDURE — 93000 ELECTROCARDIOGRAM COMPLETE: CPT | Performed by: INTERNAL MEDICINE

## 2019-05-01 PROCEDURE — 80053 COMPREHEN METABOLIC PANEL: CPT | Performed by: NURSE PRACTITIONER

## 2019-05-01 PROCEDURE — 81003 URINALYSIS AUTO W/O SCOPE: CPT | Performed by: NURSE PRACTITIONER

## 2019-05-01 PROCEDURE — 71046 X-RAY EXAM CHEST 2 VIEWS: CPT | Mod: TC

## 2019-05-01 PROCEDURE — 85025 COMPLETE CBC W/AUTO DIFF WBC: CPT | Performed by: NURSE PRACTITIONER

## 2019-05-01 PROCEDURE — 36415 COLL VENOUS BLD VENIPUNCTURE: CPT | Performed by: NURSE PRACTITIONER

## 2019-05-01 PROCEDURE — 99214 OFFICE O/P EST MOD 30 MIN: CPT | Mod: 25 | Performed by: NURSE PRACTITIONER

## 2019-05-01 ASSESSMENT — MIFFLIN-ST. JEOR: SCORE: 1129.74

## 2019-05-01 NOTE — NURSING NOTE
"Chief Complaint   Patient presents with     Pre-Op Exam     , 5-7--19, [place], TURBT       Initial BP 92/66 (BP Location: Right arm, Patient Position: Sitting, Cuff Size: Adult Regular)   Pulse 68   Temp 98.4  F (36.9  C)   Resp 14   Ht 1.549 m (5' 1\")   Wt 61.2 kg (135 lb)   LMP 06/18/2005   BMI 25.51 kg/m   Estimated body mass index is 25.51 kg/m  as calculated from the following:    Height as of this encounter: 1.549 m (5' 1\").    Weight as of this encounter: 61.2 kg (135 lb).  Medication Reconciliation: complete    Janis Rubin LPN    "

## 2019-05-01 NOTE — H&P (VIEW-ONLY)
[facility]  [address]  [place] [address]  [phone]  Dept: [phone]    PRE-OP EVALUATION:  Today's date: 2019    Kait Augustin (: 1960) presents for pre-operative evaluation assessment as requested by Dr. Walker  She requires evaluation and anesthesia risk assessment prior to undergoing surgery/procedure for treatment of bladder cancer    Proposed Surgery/ Procedure:  Transurethral Resection of Bladder Tumor Bilateral General   Bilateral Retrograde Pyelograms, possible Post op mitomycin C         Date of Surgery/ Procedure: 2019  Time of Surgery/ Procedure: To be determined  Hospital/Surgical Facility: [facility]  Primary Physician: Itzel Phillip  Type of Anesthesia Anticipated: General    Patient has a Health Care Directive or Living Will:  NO      1. NO - Do you have a history of heart attack, stroke, stent, bypass or surgery on an artery in the head, neck, heart or legs?  2. NO - Do you ever have any pain or discomfort in your chest?  3. NO - Do you have a history of  Heart Failure?  4. NO - Are you troubled by shortness of breath when: walking on the level, up a slight hill or at night?  5. NO - Do you currently have a cold, bronchitis or other respiratory infection?  6. NO - Do you have a cough, shortness of breath or wheezing?  7. NO - Do you sometimes get pains in the calves of your legs when you walk?  8. NO - Do you or anyone in your family have previous history of blood clots?  9. NO - Do you or does anyone in your family have a serious bleeding problem such as prolonged bleeding following surgeries or cuts?  10. NO - Have you ever had problems with anemia or been told to take iron pills?  11. NO - Have you had any abnormal blood loss such as black, tarry or bloody stools, or abnormal vaginal bleeding?  12. NO - Have you ever had a blood transfusion?  13. NO - Have you or any of your relatives ever had problems with  anesthesia?  14. NO - Do you have sleep apnea, excessive snoring or daytime drowsiness?  15. NO - Do you have any prosthetic heart valves?  16. NO - Do you have prosthetic joints?        HPI:     HPI related to upcoming procedure:   Bladder cancer      See problem list for active medical problems.  Problems all longstanding and stable, except as noted/documented.  See ROS for pertinent symptoms related to these conditions.                                                                                                                                                          .    MEDICAL HISTORY:     Patient Active Problem List    Diagnosis Date Noted     ACP (advance care planning) 12/13/2016     Priority: Medium     Advance Care Planning 12/13/2016: ACP Review of Chart / Resources Provided:  Reviewed chart for advance care plan.  Kait Augustin has no plan or code status on file. Discussed available resources and provided with information. Confirmed code status reflects current choices pending further ACP discussions.  Confirmed/documented legally designated decision makers.  Added by Radha Parekh             Malignant neoplasm of overlapping sites of bladder (H) 12/22/2015     Priority: Medium     H/O renal calculi 11/17/2014     Priority: Medium        Past Medical History:   Diagnosis Date     H/O renal calculi 11/17/2014     MIGRAINE HEADACHES 1996     Nicotine dependence 11/17/2014     PONV (postoperative nausea and vomiting)      Tobacco abuse 11/17/2014       Past Surgical History:   Procedure Laterality Date     C LIGATE FALLOPIAN TUBE  1996     CYSTOSCOPY, RETROGRADES, INSERT STENT URETER(S), COMBINED Right 12/8/2015    Procedure: COMBINED CYSTOSCOPY, RETROGRADES, INSERT STENT URETER(S);  Surgeon: Efren Couch MD;  Location: [facility]     CYSTOSCOPY, TRANSURETHRAL RESECTION (TUR) TUMOR BLADDER, COMBINED N/A 2/10/2015    Procedure: COMBINED CYSTOSCOPY, TRANSURETHRAL RESECTION (TUR) TUMOR  "BLADDER;  Surgeon: Efren Couch MD;  Location: [facility]     CYSTOSCOPY, TRANSURETHRAL RESECTION (TUR) TUMOR BLADDER, COMBINED N/A 12/8/2015    Procedure: COMBINED CYSTOSCOPY, TRANSURETHRAL RESECTION (TUR) TUMOR BLADDER;  Surgeon: Efren Couch MD;  Location: [facility]     No current outpatient medications on file.       OTC products: None, except as noted above      Allergies   Allergen Reactions     No Known Allergies         Latex Allergy: NO      Social History     Tobacco Use     Smoking status: Former Smoker     Packs/day: 1.00     Years: 45.00     Pack years: 45.00     Types: Cigarettes     Smokeless tobacco: Never Used   Substance Use Topics     Alcohol use: Yes     Comment: rare     History   Drug Use No       REVIEW OF SYSTEMS:   CONSTITUTIONAL: NEGATIVE for fever, chills, change in weight  INTEGUMENTARY/SKIN: NEGATIVE for worrisome rashes, moles or lesions  EYES: NEGATIVE for vision changes or irritation  ENT/MOUTH: NEGATIVE for ear, mouth and throat problems  RESP: NEGATIVE for significant cough or SOB  CV: NEGATIVE for chest pain, palpitations or peripheral edema  GI: NEGATIVE for nausea, abdominal pain, heartburn, or change in bowel habits  : NEGATIVE for frequency, dysuria, or hematuria  MUSCULOSKELETAL: NEGATIVE for significant arthralgias or myalgia  NEURO: NEGATIVE for weakness, dizziness or paresthesias  ENDOCRINE: NEGATIVE for temperature intolerance, skin/hair changes  HEME: NEGATIVE for bleeding problems  PSYCHIATRIC: NEGATIVE for changes in mood or affect      EXAM:   BP 92/66 (BP Location: Right arm, Patient Position: Sitting, Cuff Size: Adult Regular)   Pulse 68   Temp 98.4  F (36.9  C)   Resp 14   Ht 1.549 m (5' 1\")   Wt 61.2 kg (135 lb)   LMP 06/18/2005   BMI 25.51 kg/m           GENERAL APPEARANCE: healthy, alert and no distress     EYES: EOMI, PERRL     HENT: ear canals and TM's normal and nose and mouth without ulcers or lesions     NECK: no adenopathy, no " asymmetry, masses, or scars and thyroid normal to palpation     RESP: lungs clear to auscultation - no rales, rhonchi or wheezes     CV: regular rates and rhythm, normal S1 S2, no S3 or S4 and no murmur, click or rub     ABDOMEN:  soft, nontender, no HSM or masses and bowel sounds normal     MS: extremities normal- no gross deformities noted, no evidence of inflammation in joints, FROM in all extremities.     SKIN: no suspicious lesions or rashes     NEURO: Normal strength and tone, sensory exam grossly normal, mentation intact and speech normal     LYMPHATICS: No cervical adenopathy    DIAGNOSTICS:     EKG attached      EXAM:XR CHEST 2 VW      CLINICAL HISTORY: Patient Age  58 years Additional clinical info:  Pre-op exam      COMPARISON: None       TECHNIQUE: 2 views                                                                  IMPRESSION: Minimal hypertrophic change in the thoracic spine. Chest  otherwise normal.     REMINGTON LLOYD MD        Results for orders placed or performed in visit on 05/01/19   CBC with platelets differential   Result Value Ref Range    WBC 7.3 4.0 - 11.0 10e9/L    RBC Count 4.63 3.8 - 5.2 10e12/L    Hemoglobin 14.7 11.7 - 15.7 g/dL    Hematocrit 43.2 35.0 - 47.0 %    MCV 93 78 - 100 fl    MCH 31.7 26.5 - 33.0 pg    MCHC 34.0 31.5 - 36.5 g/dL    RDW 13.1 10.0 - 15.0 %    Platelet Count 224 150 - 450 10e9/L    % Neutrophils 55.0 %    % Lymphocytes 33.2 %    % Monocytes 9.0 %    % Eosinophils 2.1 %    % Basophils 0.7 %    Absolute Neutrophil 4.0 1.6 - 8.3 10e9/L    Absolute Lymphocytes 2.4 0.8 - 5.3 10e9/L    Absolute Monocytes 0.7 0.0 - 1.3 10e9/L    Absolute Eosinophils 0.2 0.0 - 0.7 10e9/L    Absolute Basophils 0.1 0.0 - 0.2 10e9/L    Diff Method Automated Method    Comprehensive metabolic panel (BMP + Alb, Alk Phos, ALT, AST, Total. Bili, TP)   Result Value Ref Range    Sodium 141 133 - 144 mmol/L    Potassium 4.2 3.4 - 5.3 mmol/L    Chloride 110 (H) 94 - 109 mmol/L    Carbon Dioxide  26 20 - 32 mmol/L    Anion Gap 5 3 - 14 mmol/L    Glucose 84 70 - 99 mg/dL    Urea Nitrogen 13 7 - 30 mg/dL    Creatinine 0.70 0.52 - 1.04 mg/dL    GFR Estimate >90 >60 mL/min/[1.73_m2]    GFR Estimate If Black >90 >60 mL/min/[1.73_m2]    Calcium 8.9 8.5 - 10.1 mg/dL    Bilirubin Total 0.5 0.2 - 1.3 mg/dL    Albumin 4.1 3.4 - 5.0 g/dL    Protein Total 7.3 6.8 - 8.8 g/dL    Alkaline Phosphatase 106 40 - 150 U/L    ALT 27 0 - 50 U/L    AST 11 0 - 45 U/L   *UA reflex to Microscopic and Culture - [facility]   Result Value Ref Range    Color Urine Yellow     Appearance Urine Clear     Glucose Urine Negative NEG^Negative mg/dL    Bilirubin Urine Negative NEG^Negative    Ketones Urine Negative NEG^Negative mg/dL    Specific Gravity Urine 1.015 1.003 - 1.035    Blood Urine Negative NEG^Negative    pH Urine 6.5 5.0 - 7.0 pH    Protein Albumin Urine Negative NEG^Negative mg/dL    Urobilinogen Urine 0.2 0.2 - 1.0 EU/dL    Nitrite Urine Negative NEG^Negative    Leukocyte Esterase Urine Negative NEG^Negative    Source Midstream Urine          Recent Labs   Lab Test 03/13/19  1127 10/17/18  1419 10/15/18  1134  01/23/15  1533   HGB 13.9 12.8 14.8   < > 13.1    237 184   < > 237   INR  --   --   --   --  1.0     --  136   < >  --    POTASSIUM 4.1  --  3.7   < >  --    CR 0.71  --  0.72   < >  --     < > = values in this interval not displayed.        IMPRESSION:   Reason for surgery/procedure: Bladder Cancer    The proposed surgical procedure is considered LOW risk.    REVISED CARDIAC RISK INDEX  The patient has the following serious cardiovascular risks for perioperative complications such as (MI, PE, VFib and 3  AV Block):  No serious cardiac risks  INTERPRETATION: 0 risks: Class I (very low risk - 0.4% complication rate)    The patient has the following additional risks for perioperative complications:  No identified additional risks      ICD-10-CM    1. Pre-op exam Z01.818 CBC with platelets differential      Comprehensive metabolic panel (BMP + Alb, Alk Phos, ALT, AST, Total. Bili, TP)     EKG 12-lead complete w/read - (Clinic Performed)     XR CHEST 2 VW (Clinic Performed)     *UA reflex to Microscopic and Culture - [facility]   2. Malignant neoplasm of overlapping sites of bladder (H) C67.8    3. Preop general physical exam Z01.818        RECOMMENDATIONS:       APPROVAL GIVEN to proceed with proposed procedure, without further diagnostic evaluation       Signed Electronically by: Itzel Phillip NP    Copy of this evaluation report is provided to requesting physician.    [facility] Preop Guidelines    Revised Cardiac Risk Index

## 2019-05-01 NOTE — PROGRESS NOTES
[facility]  [address]  [place] [address]  [phone]  Dept: [phone]    PRE-OP EVALUATION:  Today's date: 2019    Kait Augustin (: 1960) presents for pre-operative evaluation assessment as requested by Dr. Walker  She requires evaluation and anesthesia risk assessment prior to undergoing surgery/procedure for treatment of bladder cancer    Proposed Surgery/ Procedure:  Transurethral Resection of Bladder Tumor Bilateral General   Bilateral Retrograde Pyelograms, possible Post op mitomycin C         Date of Surgery/ Procedure: 2019  Time of Surgery/ Procedure: To be determined  Hospital/Surgical Facility: [facility]  Primary Physician: Itzel Phillip  Type of Anesthesia Anticipated: General    Patient has a Health Care Directive or Living Will:  NO      1. NO - Do you have a history of heart attack, stroke, stent, bypass or surgery on an artery in the head, neck, heart or legs?  2. NO - Do you ever have any pain or discomfort in your chest?  3. NO - Do you have a history of  Heart Failure?  4. NO - Are you troubled by shortness of breath when: walking on the level, up a slight hill or at night?  5. NO - Do you currently have a cold, bronchitis or other respiratory infection?  6. NO - Do you have a cough, shortness of breath or wheezing?  7. NO - Do you sometimes get pains in the calves of your legs when you walk?  8. NO - Do you or anyone in your family have previous history of blood clots?  9. NO - Do you or does anyone in your family have a serious bleeding problem such as prolonged bleeding following surgeries or cuts?  10. NO - Have you ever had problems with anemia or been told to take iron pills?  11. NO - Have you had any abnormal blood loss such as black, tarry or bloody stools, or abnormal vaginal bleeding?  12. NO - Have you ever had a blood transfusion?  13. NO - Have you or any of your relatives ever had problems with  anesthesia?  14. NO - Do you have sleep apnea, excessive snoring or daytime drowsiness?  15. NO - Do you have any prosthetic heart valves?  16. NO - Do you have prosthetic joints?        HPI:     HPI related to upcoming procedure:   Bladder cancer      See problem list for active medical problems.  Problems all longstanding and stable, except as noted/documented.  See ROS for pertinent symptoms related to these conditions.                                                                                                                                                          .    MEDICAL HISTORY:     Patient Active Problem List    Diagnosis Date Noted     ACP (advance care planning) 12/13/2016     Priority: Medium     Advance Care Planning 12/13/2016: ACP Review of Chart / Resources Provided:  Reviewed chart for advance care plan.  Kait Augustin has no plan or code status on file. Discussed available resources and provided with information. Confirmed code status reflects current choices pending further ACP discussions.  Confirmed/documented legally designated decision makers.  Added by Radha Parekh             Malignant neoplasm of overlapping sites of bladder (H) 12/22/2015     Priority: Medium     H/O renal calculi 11/17/2014     Priority: Medium        Past Medical History:   Diagnosis Date     H/O renal calculi 11/17/2014     MIGRAINE HEADACHES 1996     Nicotine dependence 11/17/2014     PONV (postoperative nausea and vomiting)      Tobacco abuse 11/17/2014       Past Surgical History:   Procedure Laterality Date     C LIGATE FALLOPIAN TUBE  1996     CYSTOSCOPY, RETROGRADES, INSERT STENT URETER(S), COMBINED Right 12/8/2015    Procedure: COMBINED CYSTOSCOPY, RETROGRADES, INSERT STENT URETER(S);  Surgeon: Efren Couch MD;  Location: [facility]     CYSTOSCOPY, TRANSURETHRAL RESECTION (TUR) TUMOR BLADDER, COMBINED N/A 2/10/2015    Procedure: COMBINED CYSTOSCOPY, TRANSURETHRAL RESECTION (TUR) TUMOR  "BLADDER;  Surgeon: Efren Couch MD;  Location: [facility]     CYSTOSCOPY, TRANSURETHRAL RESECTION (TUR) TUMOR BLADDER, COMBINED N/A 12/8/2015    Procedure: COMBINED CYSTOSCOPY, TRANSURETHRAL RESECTION (TUR) TUMOR BLADDER;  Surgeon: Efren Couch MD;  Location: [facility]     No current outpatient medications on file.       OTC products: None, except as noted above      Allergies   Allergen Reactions     No Known Allergies         Latex Allergy: NO      Social History     Tobacco Use     Smoking status: Former Smoker     Packs/day: 1.00     Years: 45.00     Pack years: 45.00     Types: Cigarettes     Smokeless tobacco: Never Used   Substance Use Topics     Alcohol use: Yes     Comment: rare     History   Drug Use No       REVIEW OF SYSTEMS:   CONSTITUTIONAL: NEGATIVE for fever, chills, change in weight  INTEGUMENTARY/SKIN: NEGATIVE for worrisome rashes, moles or lesions  EYES: NEGATIVE for vision changes or irritation  ENT/MOUTH: NEGATIVE for ear, mouth and throat problems  RESP: NEGATIVE for significant cough or SOB  CV: NEGATIVE for chest pain, palpitations or peripheral edema  GI: NEGATIVE for nausea, abdominal pain, heartburn, or change in bowel habits  : NEGATIVE for frequency, dysuria, or hematuria  MUSCULOSKELETAL: NEGATIVE for significant arthralgias or myalgia  NEURO: NEGATIVE for weakness, dizziness or paresthesias  ENDOCRINE: NEGATIVE for temperature intolerance, skin/hair changes  HEME: NEGATIVE for bleeding problems  PSYCHIATRIC: NEGATIVE for changes in mood or affect      EXAM:   BP 92/66 (BP Location: Right arm, Patient Position: Sitting, Cuff Size: Adult Regular)   Pulse 68   Temp 98.4  F (36.9  C)   Resp 14   Ht 1.549 m (5' 1\")   Wt 61.2 kg (135 lb)   LMP 06/18/2005   BMI 25.51 kg/m           GENERAL APPEARANCE: healthy, alert and no distress     EYES: EOMI, PERRL     HENT: ear canals and TM's normal and nose and mouth without ulcers or lesions     NECK: no adenopathy, no " asymmetry, masses, or scars and thyroid normal to palpation     RESP: lungs clear to auscultation - no rales, rhonchi or wheezes     CV: regular rates and rhythm, normal S1 S2, no S3 or S4 and no murmur, click or rub     ABDOMEN:  soft, nontender, no HSM or masses and bowel sounds normal     MS: extremities normal- no gross deformities noted, no evidence of inflammation in joints, FROM in all extremities.     SKIN: no suspicious lesions or rashes     NEURO: Normal strength and tone, sensory exam grossly normal, mentation intact and speech normal     LYMPHATICS: No cervical adenopathy    DIAGNOSTICS:     EKG attached      EXAM:XR CHEST 2 VW      CLINICAL HISTORY: Patient Age  58 years Additional clinical info:  Pre-op exam      COMPARISON: None       TECHNIQUE: 2 views                                                                  IMPRESSION: Minimal hypertrophic change in the thoracic spine. Chest  otherwise normal.     REMINGTON LLOYD MD        Results for orders placed or performed in visit on 05/01/19   CBC with platelets differential   Result Value Ref Range    WBC 7.3 4.0 - 11.0 10e9/L    RBC Count 4.63 3.8 - 5.2 10e12/L    Hemoglobin 14.7 11.7 - 15.7 g/dL    Hematocrit 43.2 35.0 - 47.0 %    MCV 93 78 - 100 fl    MCH 31.7 26.5 - 33.0 pg    MCHC 34.0 31.5 - 36.5 g/dL    RDW 13.1 10.0 - 15.0 %    Platelet Count 224 150 - 450 10e9/L    % Neutrophils 55.0 %    % Lymphocytes 33.2 %    % Monocytes 9.0 %    % Eosinophils 2.1 %    % Basophils 0.7 %    Absolute Neutrophil 4.0 1.6 - 8.3 10e9/L    Absolute Lymphocytes 2.4 0.8 - 5.3 10e9/L    Absolute Monocytes 0.7 0.0 - 1.3 10e9/L    Absolute Eosinophils 0.2 0.0 - 0.7 10e9/L    Absolute Basophils 0.1 0.0 - 0.2 10e9/L    Diff Method Automated Method    Comprehensive metabolic panel (BMP + Alb, Alk Phos, ALT, AST, Total. Bili, TP)   Result Value Ref Range    Sodium 141 133 - 144 mmol/L    Potassium 4.2 3.4 - 5.3 mmol/L    Chloride 110 (H) 94 - 109 mmol/L    Carbon Dioxide  26 20 - 32 mmol/L    Anion Gap 5 3 - 14 mmol/L    Glucose 84 70 - 99 mg/dL    Urea Nitrogen 13 7 - 30 mg/dL    Creatinine 0.70 0.52 - 1.04 mg/dL    GFR Estimate >90 >60 mL/min/[1.73_m2]    GFR Estimate If Black >90 >60 mL/min/[1.73_m2]    Calcium 8.9 8.5 - 10.1 mg/dL    Bilirubin Total 0.5 0.2 - 1.3 mg/dL    Albumin 4.1 3.4 - 5.0 g/dL    Protein Total 7.3 6.8 - 8.8 g/dL    Alkaline Phosphatase 106 40 - 150 U/L    ALT 27 0 - 50 U/L    AST 11 0 - 45 U/L   *UA reflex to Microscopic and Culture - [facility]   Result Value Ref Range    Color Urine Yellow     Appearance Urine Clear     Glucose Urine Negative NEG^Negative mg/dL    Bilirubin Urine Negative NEG^Negative    Ketones Urine Negative NEG^Negative mg/dL    Specific Gravity Urine 1.015 1.003 - 1.035    Blood Urine Negative NEG^Negative    pH Urine 6.5 5.0 - 7.0 pH    Protein Albumin Urine Negative NEG^Negative mg/dL    Urobilinogen Urine 0.2 0.2 - 1.0 EU/dL    Nitrite Urine Negative NEG^Negative    Leukocyte Esterase Urine Negative NEG^Negative    Source Midstream Urine          Recent Labs   Lab Test 03/13/19  1127 10/17/18  1419 10/15/18  1134  01/23/15  1533   HGB 13.9 12.8 14.8   < > 13.1    237 184   < > 237   INR  --   --   --   --  1.0     --  136   < >  --    POTASSIUM 4.1  --  3.7   < >  --    CR 0.71  --  0.72   < >  --     < > = values in this interval not displayed.        IMPRESSION:   Reason for surgery/procedure: Bladder Cancer    The proposed surgical procedure is considered LOW risk.    REVISED CARDIAC RISK INDEX  The patient has the following serious cardiovascular risks for perioperative complications such as (MI, PE, VFib and 3  AV Block):  No serious cardiac risks  INTERPRETATION: 0 risks: Class I (very low risk - 0.4% complication rate)    The patient has the following additional risks for perioperative complications:  No identified additional risks      ICD-10-CM    1. Pre-op exam Z01.818 CBC with platelets differential      Comprehensive metabolic panel (BMP + Alb, Alk Phos, ALT, AST, Total. Bili, TP)     EKG 12-lead complete w/read - (Clinic Performed)     XR CHEST 2 VW (Clinic Performed)     *UA reflex to Microscopic and Culture - [facility]   2. Malignant neoplasm of overlapping sites of bladder (H) C67.8    3. Preop general physical exam Z01.818        RECOMMENDATIONS:       APPROVAL GIVEN to proceed with proposed procedure, without further diagnostic evaluation       Signed Electronically by: Itzel Phillip NP    Copy of this evaluation report is provided to requesting physician.    [facility] Preop Guidelines    Revised Cardiac Risk Index

## 2019-05-06 ENCOUNTER — ANESTHESIA EVENT (OUTPATIENT)
Dept: SURGERY | Facility: OTHER | Age: 59
End: 2019-05-06
Payer: COMMERCIAL

## 2019-05-07 ENCOUNTER — HOSPITAL ENCOUNTER (OUTPATIENT)
Dept: GENERAL RADIOLOGY | Facility: OTHER | Age: 59
End: 2019-05-07
Attending: UROLOGY | Admitting: UROLOGY
Payer: COMMERCIAL

## 2019-05-07 ENCOUNTER — HOSPITAL ENCOUNTER (OUTPATIENT)
Facility: OTHER | Age: 59
Discharge: HOME | End: 2019-05-07
Attending: UROLOGY | Admitting: UROLOGY
Payer: COMMERCIAL

## 2019-05-07 ENCOUNTER — ANESTHESIA (OUTPATIENT)
Dept: SURGERY | Facility: OTHER | Age: 59
End: 2019-05-07
Payer: COMMERCIAL

## 2019-05-07 VITALS
TEMPERATURE: 96.8 F | OXYGEN SATURATION: 96 % | DIASTOLIC BLOOD PRESSURE: 68 MMHG | WEIGHT: 135 LBS | SYSTOLIC BLOOD PRESSURE: 119 MMHG | RESPIRATION RATE: 16 BRPM | BODY MASS INDEX: 25.51 KG/M2 | HEART RATE: 60 BPM

## 2019-05-07 DIAGNOSIS — C67.9 BLADDER CANCER (H): ICD-10-CM

## 2019-05-07 DIAGNOSIS — C67.8 MALIGNANT NEOPLASM OF OVERLAPPING SITES OF BLADDER (H): Primary | ICD-10-CM

## 2019-05-07 PROCEDURE — 71000027 ZZH RECOVERY PHASE 2 EACH 15 MINS: Performed by: UROLOGY

## 2019-05-07 PROCEDURE — C1769 GUIDE WIRE: HCPCS | Performed by: UROLOGY

## 2019-05-07 PROCEDURE — 74420 UROGRAPHY RTRGR +-KUB: CPT

## 2019-05-07 PROCEDURE — 88307 TISSUE EXAM BY PATHOLOGIST: CPT

## 2019-05-07 PROCEDURE — 52240 CYSTOSCOPY AND TREATMENT: CPT | Performed by: NURSE ANESTHETIST, CERTIFIED REGISTERED

## 2019-05-07 PROCEDURE — 25500064 ZZH RX 255 OP 636: Performed by: UROLOGY

## 2019-05-07 PROCEDURE — 52240 CYSTOSCOPY AND TREATMENT: CPT | Performed by: UROLOGY

## 2019-05-07 PROCEDURE — 25000128 H RX IP 250 OP 636: Performed by: UROLOGY

## 2019-05-07 PROCEDURE — 40000306 ZZH STATISTIC PRE PROC ASSESS II: Performed by: UROLOGY

## 2019-05-07 PROCEDURE — 37000009 ZZH ANESTHESIA TECHNICAL FEE, EACH ADDTL 15 MIN: Performed by: UROLOGY

## 2019-05-07 PROCEDURE — 36000058 ZZH SURGERY LEVEL 3 EA 15 ADDTL MIN: Performed by: UROLOGY

## 2019-05-07 PROCEDURE — 25000125 ZZHC RX 250: Performed by: NURSE ANESTHETIST, CERTIFIED REGISTERED

## 2019-05-07 PROCEDURE — 74420 UROGRAPHY RTRGR +-KUB: CPT | Mod: 26 | Performed by: UROLOGY

## 2019-05-07 PROCEDURE — 25000564 ZZH DESFLURANE, EA 15 MIN: Performed by: UROLOGY

## 2019-05-07 PROCEDURE — 25800025 ZZH RX 258: Performed by: UROLOGY

## 2019-05-07 PROCEDURE — 36000060 ZZH SURGERY LEVEL 3 W FLUORO 1ST 30 MIN: Performed by: UROLOGY

## 2019-05-07 PROCEDURE — C1758 CATHETER, URETERAL: HCPCS | Performed by: UROLOGY

## 2019-05-07 PROCEDURE — 27210794 ZZH OR GENERAL SUPPLY STERILE: Performed by: UROLOGY

## 2019-05-07 PROCEDURE — 71000014 ZZH RECOVERY PHASE 1 LEVEL 2 FIRST HR: Performed by: UROLOGY

## 2019-05-07 PROCEDURE — 52332 CYSTOSCOPY AND TREATMENT: CPT | Performed by: UROLOGY

## 2019-05-07 PROCEDURE — 25800030 ZZH RX IP 258 OP 636: Performed by: NURSE ANESTHETIST, CERTIFIED REGISTERED

## 2019-05-07 PROCEDURE — 25000128 H RX IP 250 OP 636: Performed by: NURSE ANESTHETIST, CERTIFIED REGISTERED

## 2019-05-07 PROCEDURE — 52355 CYSTOURETERO W/EXCISE TUMOR: CPT | Performed by: UROLOGY

## 2019-05-07 PROCEDURE — 76499 UNLISTED DX RADIOGRAPHIC PX: CPT

## 2019-05-07 PROCEDURE — 37000008 ZZH ANESTHESIA TECHNICAL FEE, 1ST 30 MIN: Performed by: UROLOGY

## 2019-05-07 PROCEDURE — C2617 STENT, NON-COR, TEM W/O DEL: HCPCS | Performed by: UROLOGY

## 2019-05-07 DEVICE — STENT URETERAL CONTOUR SOFT PERCUFLEX 6FRX22CM
Type: IMPLANTABLE DEVICE | Site: URETER | Status: NON-FUNCTIONAL
Removed: 2019-05-28

## 2019-05-07 RX ORDER — ONDANSETRON 4 MG/1
4 TABLET, ORALLY DISINTEGRATING ORAL EVERY 30 MIN PRN
Status: DISCONTINUED | OUTPATIENT
Start: 2019-05-07 | End: 2019-05-07 | Stop reason: HOSPADM

## 2019-05-07 RX ORDER — LIDOCAINE HYDROCHLORIDE 20 MG/ML
INJECTION, SOLUTION INFILTRATION; PERINEURAL PRN
Status: DISCONTINUED | OUTPATIENT
Start: 2019-05-07 | End: 2019-05-07

## 2019-05-07 RX ORDER — DEXAMETHASONE SODIUM PHOSPHATE 4 MG/ML
INJECTION, SOLUTION INTRA-ARTICULAR; INTRALESIONAL; INTRAMUSCULAR; INTRAVENOUS; SOFT TISSUE PRN
Status: DISCONTINUED | OUTPATIENT
Start: 2019-05-07 | End: 2019-05-07

## 2019-05-07 RX ORDER — ONDANSETRON 2 MG/ML
INJECTION INTRAMUSCULAR; INTRAVENOUS PRN
Status: DISCONTINUED | OUTPATIENT
Start: 2019-05-07 | End: 2019-05-07

## 2019-05-07 RX ORDER — GLYCOPYRROLATE 0.2 MG/ML
INJECTION, SOLUTION INTRAMUSCULAR; INTRAVENOUS PRN
Status: DISCONTINUED | OUTPATIENT
Start: 2019-05-07 | End: 2019-05-07

## 2019-05-07 RX ORDER — NALOXONE HYDROCHLORIDE 0.4 MG/ML
.1-.4 INJECTION, SOLUTION INTRAMUSCULAR; INTRAVENOUS; SUBCUTANEOUS
Status: DISCONTINUED | OUTPATIENT
Start: 2019-05-07 | End: 2019-05-07 | Stop reason: HOSPADM

## 2019-05-07 RX ORDER — KETAMINE HYDROCHLORIDE 50 MG/ML
INJECTION, SOLUTION INTRAMUSCULAR; INTRAVENOUS PRN
Status: DISCONTINUED | OUTPATIENT
Start: 2019-05-07 | End: 2019-05-07

## 2019-05-07 RX ORDER — SCOLOPAMINE TRANSDERMAL SYSTEM 1 MG/1
1 PATCH, EXTENDED RELEASE TRANSDERMAL
Status: DISCONTINUED | OUTPATIENT
Start: 2019-05-07 | End: 2019-05-07 | Stop reason: HOSPADM

## 2019-05-07 RX ORDER — KETOROLAC TROMETHAMINE 30 MG/ML
INJECTION, SOLUTION INTRAMUSCULAR; INTRAVENOUS PRN
Status: DISCONTINUED | OUTPATIENT
Start: 2019-05-07 | End: 2019-05-07

## 2019-05-07 RX ORDER — CEFTRIAXONE SODIUM 1 G/50ML
1 INJECTION, SOLUTION INTRAVENOUS
Status: COMPLETED | OUTPATIENT
Start: 2019-05-07 | End: 2019-05-07

## 2019-05-07 RX ORDER — FENTANYL CITRATE 50 UG/ML
INJECTION, SOLUTION INTRAMUSCULAR; INTRAVENOUS PRN
Status: DISCONTINUED | OUTPATIENT
Start: 2019-05-07 | End: 2019-05-07

## 2019-05-07 RX ORDER — FENTANYL CITRATE 50 UG/ML
25-50 INJECTION, SOLUTION INTRAMUSCULAR; INTRAVENOUS
Status: DISCONTINUED | OUTPATIENT
Start: 2019-05-07 | End: 2019-05-07 | Stop reason: HOSPADM

## 2019-05-07 RX ORDER — ONDANSETRON 2 MG/ML
4 INJECTION INTRAMUSCULAR; INTRAVENOUS EVERY 30 MIN PRN
Status: DISCONTINUED | OUTPATIENT
Start: 2019-05-07 | End: 2019-05-07 | Stop reason: HOSPADM

## 2019-05-07 RX ORDER — SODIUM CHLORIDE 9 MG/ML
INJECTION, SOLUTION INTRAVENOUS CONTINUOUS
Status: DISCONTINUED | OUTPATIENT
Start: 2019-05-07 | End: 2019-05-07 | Stop reason: HOSPADM

## 2019-05-07 RX ORDER — PROPOFOL 10 MG/ML
INJECTION, EMULSION INTRAVENOUS CONTINUOUS PRN
Status: DISCONTINUED | OUTPATIENT
Start: 2019-05-07 | End: 2019-05-07

## 2019-05-07 RX ORDER — OXYCODONE AND ACETAMINOPHEN 5; 325 MG/1; MG/1
1-2 TABLET ORAL EVERY 6 HOURS PRN
Qty: 20 TABLET | Refills: 0 | Status: SHIPPED | OUTPATIENT
Start: 2019-05-07 | End: 2019-05-13

## 2019-05-07 RX ORDER — SULFAMETHOXAZOLE/TRIMETHOPRIM 800-160 MG
1 TABLET ORAL 2 TIMES DAILY
Qty: 6 TABLET | Refills: 0 | Status: SHIPPED | OUTPATIENT
Start: 2019-05-07 | End: 2019-05-13

## 2019-05-07 RX ORDER — PROPOFOL 10 MG/ML
INJECTION, EMULSION INTRAVENOUS PRN
Status: DISCONTINUED | OUTPATIENT
Start: 2019-05-07 | End: 2019-05-07

## 2019-05-07 RX ORDER — OXYCODONE AND ACETAMINOPHEN 5; 325 MG/1; MG/1
1-2 TABLET ORAL EVERY 4 HOURS PRN
Status: DISCONTINUED | OUTPATIENT
Start: 2019-05-07 | End: 2019-05-07 | Stop reason: HOSPADM

## 2019-05-07 RX ORDER — MEPERIDINE HYDROCHLORIDE 50 MG/ML
12.5 INJECTION INTRAMUSCULAR; INTRAVENOUS; SUBCUTANEOUS
Status: DISCONTINUED | OUTPATIENT
Start: 2019-05-07 | End: 2019-05-07 | Stop reason: HOSPADM

## 2019-05-07 RX ORDER — NEOSTIGMINE METHYLSULFATE 1 MG/ML
VIAL (ML) INJECTION PRN
Status: DISCONTINUED | OUTPATIENT
Start: 2019-05-07 | End: 2019-05-07

## 2019-05-07 RX ORDER — LIDOCAINE 40 MG/G
CREAM TOPICAL
Status: DISCONTINUED | OUTPATIENT
Start: 2019-05-07 | End: 2019-05-07 | Stop reason: HOSPADM

## 2019-05-07 RX ORDER — HYDROMORPHONE HYDROCHLORIDE 1 MG/ML
.3-.5 INJECTION, SOLUTION INTRAMUSCULAR; INTRAVENOUS; SUBCUTANEOUS EVERY 10 MIN PRN
Status: DISCONTINUED | OUTPATIENT
Start: 2019-05-07 | End: 2019-05-07 | Stop reason: HOSPADM

## 2019-05-07 RX ORDER — FENTANYL CITRATE 50 UG/ML
25-50 INJECTION, SOLUTION INTRAMUSCULAR; INTRAVENOUS EVERY 5 MIN PRN
Status: DISCONTINUED | OUTPATIENT
Start: 2019-05-07 | End: 2019-05-07 | Stop reason: HOSPADM

## 2019-05-07 RX ADMIN — SODIUM CHLORIDE: 9 INJECTION, SOLUTION INTRAVENOUS at 11:55

## 2019-05-07 RX ADMIN — SCOPALAMINE 1 PATCH: 1 PATCH, EXTENDED RELEASE TRANSDERMAL at 09:01

## 2019-05-07 RX ADMIN — LIDOCAINE HYDROCHLORIDE 80 MG: 20 INJECTION, SOLUTION INFILTRATION; PERINEURAL at 10:54

## 2019-05-07 RX ADMIN — MIDAZOLAM 2 MG: 1 INJECTION INTRAMUSCULAR; INTRAVENOUS at 10:48

## 2019-05-07 RX ADMIN — KETAMINE HYDROCHLORIDE 30 MG: 50 INJECTION, SOLUTION INTRAMUSCULAR; INTRAVENOUS at 11:08

## 2019-05-07 RX ADMIN — PROPOFOL 225 MCG/KG/MIN: 10 INJECTION, EMULSION INTRAVENOUS at 10:56

## 2019-05-07 RX ADMIN — SODIUM CHLORIDE: 9 INJECTION, SOLUTION INTRAVENOUS at 08:54

## 2019-05-07 RX ADMIN — PROPOFOL 140 MG: 10 INJECTION, EMULSION INTRAVENOUS at 10:54

## 2019-05-07 RX ADMIN — DEXAMETHASONE SODIUM PHOSPHATE 8 MG: 4 INJECTION, SOLUTION INTRA-ARTICULAR; INTRALESIONAL; INTRAMUSCULAR; INTRAVENOUS; SOFT TISSUE at 11:04

## 2019-05-07 RX ADMIN — ROCURONIUM BROMIDE 30 MG: 10 INJECTION INTRAVENOUS at 10:54

## 2019-05-07 RX ADMIN — ROCURONIUM BROMIDE 10 MG: 10 INJECTION INTRAVENOUS at 11:08

## 2019-05-07 RX ADMIN — NEOSTIGMINE METHYLSULFATE 3 MG: 1 INJECTION INTRAVENOUS at 11:50

## 2019-05-07 RX ADMIN — CEFTRIAXONE SODIUM 1 G: 1 INJECTION, SOLUTION INTRAVENOUS at 10:52

## 2019-05-07 RX ADMIN — GLYCOPYRROLATE 0.4 MG: 0.2 INJECTION, SOLUTION INTRAMUSCULAR; INTRAVENOUS at 11:50

## 2019-05-07 RX ADMIN — ONDANSETRON 4 MG: 2 INJECTION INTRAMUSCULAR; INTRAVENOUS at 10:54

## 2019-05-07 RX ADMIN — KETOROLAC TROMETHAMINE 30 MG: 30 INJECTION, SOLUTION INTRAMUSCULAR at 11:33

## 2019-05-07 RX ADMIN — FENTANYL CITRATE 50 MCG: 50 INJECTION, SOLUTION INTRAMUSCULAR; INTRAVENOUS at 10:49

## 2019-05-07 ASSESSMENT — LIFESTYLE VARIABLES: TOBACCO_USE: 1

## 2019-05-07 NOTE — OR NURSING
PACU Transfer Note    Kait Augustin was transferred to [facility] via cart.  Equipment used for transport:  none.  Accompanied by:  DEVANTE Hodge RN  Prescriptions were: Dr Walker took    PACU Respiratory Event Documentation     1) Episodes of Apnea greater than or equal to 10 seconds: 0    2) Bradypnea - less than 8 breaths per minute: 0    3) Pain score on 0 to 10 scale:     4) Pain-sedation mismatch (yes or no): no    5) Repeated 02 desaturation less than 90% (yes or no): no    Anesthesia notified? (yes or no): na    Any of the above events occuring repeatedly in separate 30 minute intervals may be considered recurrent PACU respiratory events.    Patient stable and meets phase 1 discharge criteria for transport from PACU.

## 2019-05-07 NOTE — ANESTHESIA POSTPROCEDURE EVALUATION
Patient: Kait Augustin    Procedure(s):  Transurethral Resection of Bladder Tumor, BILATERAL RETROGRADE PYELOGRAMS, RIGHT URETEROSCOPY WITH HOLMIUM LASER TUMOR ABLATION AND STENT PLACEMENT    Diagnosis:bladder tumor  Diagnosis Additional Information: No value filed.    Anesthesia Type:  General, ETT    Note:  Anesthesia Post Evaluation    Patient location during evaluation: Phase 2  Patient participation: Able to fully participate in evaluation  Level of consciousness: awake and alert  Pain management: adequate  Airway patency: patent  Cardiovascular status: acceptable  Respiratory status: acceptable  Hydration status: acceptable  PONV: none     Anesthetic complications: None          Last vitals:  Vitals:    05/07/19 1225 05/07/19 1230 05/07/19 1240   BP: 107/60  115/63   Pulse: 65  60   Resp: 16 16    Temp:  96.9  F (36.1  C) 96.8  F (36  C)   SpO2: 95% 94% 96%         Electronically Signed By: AISSATOU LEOS CRNA  May 7, 2019  1:16 PM

## 2019-05-07 NOTE — ANESTHESIA PREPROCEDURE EVALUATION
Anesthesia Pre-Procedure Evaluation    Patient: Kait Augustin   MRN: [id number] : 1960          Preoperative Diagnosis: bladder tumor    Procedure(s):  Transurethral Resection of Bladder Tumor  Bilateral Retrograde Pyelograms, possible Post op mitomycin C    Past Medical History:   Diagnosis Date     H/O renal calculi 2014     MIGRAINE HEADACHES      Nicotine dependence 2014     PONV (postoperative nausea and vomiting)      Tobacco abuse 2014     Past Surgical History:   Procedure Laterality Date     C LIGATE FALLOPIAN TUBE       CYSTOSCOPY, RETROGRADES, INSERT STENT URETER(S), COMBINED Right 2015    Procedure: COMBINED CYSTOSCOPY, RETROGRADES, INSERT STENT URETER(S);  Surgeon: Efren Couch MD;  Location: [facility]     CYSTOSCOPY, TRANSURETHRAL RESECTION (TUR) TUMOR BLADDER, COMBINED N/A 2/10/2015    Procedure: COMBINED CYSTOSCOPY, TRANSURETHRAL RESECTION (TUR) TUMOR BLADDER;  Surgeon: Efren Couch MD;  Location: [facility]     CYSTOSCOPY, TRANSURETHRAL RESECTION (TUR) TUMOR BLADDER, COMBINED N/A 2015    Procedure: COMBINED CYSTOSCOPY, TRANSURETHRAL RESECTION (TUR) TUMOR BLADDER;  Surgeon: Efren Couch MD;  Location: [facility]       Anesthesia Evaluation     . Pt has had prior anesthetic.     History of anesthetic complications   - PONV        ROS/MED HX    ENT/Pulmonary:     (+)tobacco use, Current use 2 cigarettes per day packs/day  , . .    Neurologic:  - neg neurologic ROS     Cardiovascular:  - neg cardiovascular ROS       METS/Exercise Tolerance:  >4 METS   Hematologic:  - neg hematologic  ROS       Musculoskeletal:  - neg musculoskeletal ROS       GI/Hepatic:  - neg GI/hepatic ROS       Renal/Genitourinary:  - ROS Renal section negative       Endo:  - neg endo ROS       Psychiatric:  - neg psychiatric ROS       Infectious Disease:  - neg infectious disease ROS       Malignancy:      - no malignancy   Other:    - neg other ROS  "                     Physical Exam  Normal systems: cardiovascular, pulmonary and dental    Airway   Mallampati: II  TM distance: >3 FB  Neck ROM: full    Dental     Cardiovascular   Rhythm and rate: regular and normal      Pulmonary    breath sounds clear to auscultation            Lab Results   Component Value Date    WBC 7.3 05/01/2019    HGB 14.7 05/01/2019    HCT 43.2 05/01/2019     05/01/2019     05/01/2019    POTASSIUM 4.2 05/01/2019    CHLORIDE 110 (H) 05/01/2019    CO2 26 05/01/2019    BUN 13 05/01/2019    CR 0.70 05/01/2019    GLC 84 05/01/2019    LONG 8.9 05/01/2019    PHOS 3.0 10/15/2018    ALBUMIN 4.1 05/01/2019    PROTTOTAL 7.3 05/01/2019    ALT 27 05/01/2019    AST 11 05/01/2019    ALKPHOS 106 05/01/2019    BILITOTAL 0.5 05/01/2019    INR 1.0 01/23/2015    TSH 1.00 11/17/2014       Preop Vitals  BP Readings from Last 3 Encounters:   05/07/19 120/63   05/01/19 92/66   04/24/19 114/68    Pulse Readings from Last 3 Encounters:   05/07/19 75   05/01/19 68   04/24/19 75      Resp Readings from Last 3 Encounters:   05/07/19 16   05/01/19 14   04/24/19 16    SpO2 Readings from Last 3 Encounters:   05/07/19 96%   04/24/19 96%   03/13/19 98%      Temp Readings from Last 1 Encounters:   05/07/19 97.7  F (36.5  C) (Tympanic)    Ht Readings from Last 1 Encounters:   05/01/19 1.549 m (5' 1\")      Wt Readings from Last 1 Encounters:   05/07/19 61.2 kg (135 lb)    Estimated body mass index is 25.51 kg/m  as calculated from the following:    Height as of 5/1/19: 1.549 m (5' 1\").    Weight as of this encounter: 61.2 kg (135 lb).       Anesthesia Plan      History & Physical Review      ASA Status:  2 .    NPO Status:  > 6 hours    Plan for General and ETT with Propofol induction. Maintenance will be TIVA.    PONV prophylaxis:  Ondansetron (or other 5HT-3), Dexamethasone or Solumedrol and Scopolamine patch       Postoperative Care      Consents                 VU ROLDAN, APRN CRNA  "

## 2019-05-07 NOTE — OP NOTE
Preoperative diagnosis  Bladder tumor    Postoperative diagnosis  Bladder tumor  Right renal pelvic tumor    Procedure performed  Transurethral resection of bladder tumor, >5cm  Cystoscopy with bilateral  retrograde pyelogram  Interpretation of retrograde, bilateral  Right Holmium laser ablation of right renal pelvis tumor  Right ureteral stent placement.    Surgeon  Delfino Walker MD    Surgeon(s)/Proceduralist(s) and Assistants (if any)  Surgeon(s):  Delfino Walker MD  Circulator: Natalie Arzola RN; Molly No RN  : Audra Meredith  Scrub Person: Camille Grullon  X-Ray Technologist: Adri Bustamante  First Assistant: Mae Valdez RN  Pre-Op Nurse: Asha Durand RN    Specimen(s)  Yes- Bladder tumor    (EBL) Estimated blood loss (ml)  20    Anesthesia  General    Complications  None    Findings  Bladder contained a large papillary bladder tumor involving the right lateral wall, 6cm area of resection    Right renal pelvis papillary tumor present with minimal three dimensional structure.  Primarily a carpet    Left retrograde pyelogram revealed a delicate system with normal caliber ureter with no filling defects.    No filling defects with the renal pelvis or calyces.    Right retrograde pyelogram revealed an abnormal appearing upper tract, ureter was normal caliber.  The abnormal shape of the right renal pelvis prompted diagnostic right ureteroscopy.    Right ureteroscopy revealed the upper tract tumor.    Indications  58 year old female agreed to undergo the above named procedure after discussion of the alternatives, risks and benefits.    The patient was found to have a bladder tumor on cystoscopy for work up of gross hematuria.  She has a remote history of bladder cancer s/p TURBT x 2 involving the right ureteral orifice.  Informed consent was obtained.      Procedure  The patient was taken to the operating room and placed supine on the operating table.  Pre-operative antibiotics  were administered and bilateral lower extremity SCDs were placed.    After induction of general anesthesia the patient was positioned in dorsal lithotomy.    A time-out was performed and the patient was prepped and draped in a sterile fashion.      Serial dilation of the meatus was performed to 30 Prydeinig in order to allow the scope to safely be passed through the urethra.    A 22 Prydeinig rigid cystoscope was introduced into the bladder under direct vision and cystoscopy was performed.    A Sensor wire was passed through the right  ureteral orifice and a 5 Prydeinig catheter was passed over the wire and the wire was removed.    A retrograde pyelogram was performed by slowly injecting Omnipaque contrast, 5 mL, through the 5 Prydeinig catheter.    The entire collecting system was fluoroscopically visualized with findings described above.      The 5 Prydeinig catheter was then passed through the left ureteral orifice and the same procedure was performed as described above.    The entire collecting system was fluoroscopically visualized with findings described above.      Due to the abnormal right collecting system a Sensor wire was passed to the kidney.  A 5 Prydeinig was passed over the wire with retrograde performed.  An 8-10 was passed over the wire and Superstiff placed in the kidney.  A 9.5-11 x 35 access sheath was passed over the Superstiff wire.  The kidney was entered and the tumor ablated with Holmium laser at settings 0.4 and 50.  Tumor was lower pole location.  Access sheath was removed.  A 6 x 22 stent was placed retrograde with use of fluoroscopy.    A 28 Prydeinig continuous flow resectoscope was introduced into the bladder.   Using a working element, the described mass was resected.    This was felt to be a complete resection of visible tumor.    Hemostasis was confirmed and a 20F catheter was placed in a sterile fashion.      The patient tolerated the procedure well, was awakened, extubated and transferred to the  recovery room in stable condition.    Plan  Follow up in clinic this Monday for a void trial  Follow up in the OR in 1 month for right diagnostic ureteroscopy and possible tumor ablation.

## 2019-05-07 NOTE — DISCHARGE INSTRUCTIONS
[facility]  Adult Discharge Orders & Instructions      For 24 hours after surgery:  1. Get plenty of rest.  A responsible adult must stay with you for at least 24 hours after you leave the hospital.   2. You may feel lightheaded.  IF so, sit for a few minutes before standing.  Have someone help you get up.   3. You may have a slight fever. Call the doctor if your fever is over 101 F (38.3 C) (taken under the tongue) or lasts longer than 24 hours.  4. You may have a dry mouth, a sore throat, muscle aches or trouble sleeping.  These should go away after 24 hours.  5. Do not make important or legal decisions.  6.   Do not drive or use heavy equipment.  If you have weakness or tingling, don't drive or use heavy equipment until this feeling goes away.                                                                                                                                                                         To contact a doctor, call    [phone]______________      Scopolamine Patch  A scopolamine patch was placed behind your left ear to help prevent nausea. If you are feeling good tomorrow, may remove it. However, the Scopolamine Patch will help with nausea up to 72 hours.  During removal of the patch do not touch your eyes, wash hands well after removing, and keep out of reach of children and pets.

## 2019-05-07 NOTE — ANESTHESIA CARE TRANSFER NOTE
Patient: Kait Augustin    Procedure(s):  Transurethral Resection of Bladder Tumor, BILATERAL RETROGRADE PYELOGRAMS, RIGHT URETEROSCOPY WITH HOLMIUM LASER TUMOR ABLATION AND STENT PLACEMENT    Diagnosis: bladder tumor  Diagnosis Additional Information: No value filed.    Anesthesia Type:   General, ETT     Note:  Airway :Face Mask  Patient transferred to:PACU  Handoff Report: Identifed the Patient, Identified the Reponsible Provider, Reviewed the pertinent medical history, Discussed the surgical course, Reviewed Intra-OP anesthesia mangement and issues during anesthesia, Set expectations for post-procedure period and Allowed opportunity for questions and acknowledgement of understanding      Vitals: (Last set prior to Anesthesia Care Transfer)    CRNA VITALS  5/7/2019 1133 - 5/7/2019 1205      5/7/2019             Pulse:  85    Ht Rate:  85    SpO2:  96 %    Resp Rate (set):  10                Electronically Signed By: AISSATOU SANTAMARIA CRNA  May 7, 2019  12:05 PM

## 2019-05-09 ENCOUNTER — TELEPHONE (OUTPATIENT)
Dept: UROLOGY | Facility: OTHER | Age: 59
End: 2019-05-09

## 2019-05-09 NOTE — TELEPHONE ENCOUNTER
Per Delfino Walker MD blood in the urine that comes and goes can happen and is of no concern as long as clots are not plugging her ability to urinate.  Patient notified of this and will be seeing Delfino Walker MD on Monday for a void trial.  Mariza Rubin RN......May 9, 2019...2:13 PM

## 2019-05-13 ENCOUNTER — OFFICE VISIT (OUTPATIENT)
Dept: UROLOGY | Facility: OTHER | Age: 59
End: 2019-05-13
Attending: UROLOGY
Payer: COMMERCIAL

## 2019-05-13 VITALS
DIASTOLIC BLOOD PRESSURE: 80 MMHG | SYSTOLIC BLOOD PRESSURE: 120 MMHG | WEIGHT: 133.6 LBS | HEART RATE: 68 BPM | RESPIRATION RATE: 16 BRPM | BODY MASS INDEX: 25.24 KG/M2

## 2019-05-13 DIAGNOSIS — D49.4 BLADDER NEOPLASM: ICD-10-CM

## 2019-05-13 DIAGNOSIS — Z85.51 HISTORY OF BLADDER CANCER: Primary | ICD-10-CM

## 2019-05-13 PROCEDURE — 51798 US URINE CAPACITY MEASURE: CPT | Performed by: UROLOGY

## 2019-05-13 PROCEDURE — 51700 IRRIGATION OF BLADDER: CPT | Performed by: UROLOGY

## 2019-05-13 PROCEDURE — 99214 OFFICE O/P EST MOD 30 MIN: CPT | Mod: 25 | Performed by: UROLOGY

## 2019-05-13 RX ORDER — OXYBUTYNIN CHLORIDE 5 MG/1
5 TABLET ORAL 3 TIMES DAILY
Qty: 60 TABLET | Refills: 1 | Status: SHIPPED | OUTPATIENT
Start: 2019-05-13 | End: 2019-07-29

## 2019-05-13 ASSESSMENT — PAIN SCALES - GENERAL: PAINLEVEL: MODERATE PAIN (4)

## 2019-05-13 NOTE — NURSING NOTE
Chief Complaint   Patient presents with     Procedure     PVR   Void Trial  Verbal order read back by Delfino Walker MD to perform void trial and post void residual bladder scan.  Patient's bladder was filled under gravity with 120mL of sterile saline.  Patient voided 200mL.        Medication Reconciliation: completed   Nae Menchaca LPN  5/13/2019 8:44 AM

## 2019-05-13 NOTE — NURSING NOTE
Chief Complaint   Patient presents with     Procedure     PVR     Review of Systems:    Weight loss:    No     Recent fever/chills:  No   Night sweats:   No  Current skin rash:  No   Recent hair loss:  No  Heat intolerance:  No   Cold intolerance:  No  Chest pain:   No   Palpitations:   No  Shortness of breath:  No   Wheezing:   No  Constipation:    yes   Diarrhea:   No   Nausea:   No   Vomiting:   No   Kidney/side pain:  No   Back pain:   No  Frequent headaches:  No   Dizziness:     No  Leg swelling:   No   Calf pain:    No      Medication Reconciliation: completed   Nae Menchaca LPN  5/13/2019 8:40 AM

## 2019-05-13 NOTE — PROGRESS NOTES
Type of Visit  Established    Chief Complaint  Bladder cancer  Right upper tract urothelial carcinoma    HPI  Ms. Augustin is a 58 year old female who is status post TURBT of a bladder mass.  She also underwent right diagnostic ureteroscopy at the time of surgery revealing low-grade noninvasive papillary appearing urothelial carcinoma.  This was ablated with holmium laser with stent placement.  Of note her original bladder resection involved her right ureteral orifice about 4 years ago.  She denies fevers or chills.   Patient follows up today for pathology review.      Review of Systems  I reviewed the ROS with the patient.    Nursing Notes:   Nae Menchaca LPN  5/13/2019  8:56 AM  Signed  Chief Complaint   Patient presents with     Procedure     PVR     Review of Systems:    Weight loss:    No     Recent fever/chills:  No   Night sweats:   No  Current skin rash:  No   Recent hair loss:  No  Heat intolerance:  No   Cold intolerance:  No  Chest pain:   No   Palpitations:   No  Shortness of breath:  No   Wheezing:   No  Constipation:    yes   Diarrhea:   No   Nausea:   No   Vomiting:   No   Kidney/side pain:  No   Back pain:   No  Frequent headaches:  No   Dizziness:     No  Leg swelling:   No   Calf pain:    No      Medication Reconciliation: completed   Nae Menchaca LPN  5/13/2019 8:40 AM     Nae Menchaca LPN  5/13/2019 10:10 AM  Signed  Chief Complaint   Patient presents with     Procedure     PVR   Void Trial  Verbal order read back by Delfino Walker MD to perform void trial and post void residual bladder scan.  Patient's bladder was filled under gravity with 120mL of sterile saline.  Patient voided 200mL.        Medication Reconciliation: completed   Nae Menchaca LPN  5/13/2019 8:44 AM     Family History  Family History   Problem Relation Age of Onset     C.A.D. Father         dx late 60's, CABG, angioplasty     Cancer Father         Lung, dx age 80 h/o tobacco use     Heart Disease Mother      Thyroid  Disease Mother      Lipids Sister        Physical Exam  /80 (BP Location: Right arm, Patient Position: Sitting, Cuff Size: Adult Regular)   Pulse 68   Resp 16   Wt 60.6 kg (133 lb 9.6 oz)   LMP 06/18/2005   BMI 25.24 kg/m    Constitutional: NAD, WDWN.  Cardiovascular: Regular rate.  Pulmonary/Chest: Respirations are even and non-labored bilaterally.  Abdominal: Soft. No distension, tenderness, masses or guarding. No CVA tenderness.  Extremities: THOR x 4, Warm. No clubbing.  No cyanosis.    Skin: Pink, warm and dry.  No rashes noted.    Labs  Results for LAZARO KEYS (MRN [id number]) as of 5/13/2019 13:23   5/1/2019 11:16 5/1/2019 11:21   Sodium 141    Potassium 4.2    Chloride 110 (H)    Carbon Dioxide 26    Urea Nitrogen 13    Creatinine 0.70    GFR Estimate >90    GFR Estimate If Black >90    Calcium 8.9    Anion Gap 5    Albumin 4.1    Protein Total 7.3    Bilirubin Total 0.5    Alkaline Phosphatase 106    ALT 27    AST 11    Glucose 84    WBC 7.3    Hemoglobin 14.7    Hematocrit 43.2    Platelet Count 224    RBC Count 4.63    MCV 93    MCH 31.7    MCHC 34.0    RDW 13.1    Diff Method Automated Method    % Neutrophils 55.0    % Lymphocytes 33.2    % Monocytes 9.0    % Eosinophils 2.1    % Basophils 0.7    Absolute Neutrophil 4.0    Absolute Lymphocytes 2.4    Absolute Monocytes 0.7    Absolute Eosinophils 0.2    Absolute Basophils 0.1    Color Urine  Yellow   Appearance Urine  Clear   Glucose Urine  Negative   Bilirubin Urine  Negative   Ketones Urine  Negative   Specific Gravity Urine  1.015   pH Urine  6.5   Protein Albumin Urine  Negative   Urobilinogen Urine  0.2   Nitrite Urine  Negative   Blood Urine  Negative   Leukocyte Esterase Urine  Negative   Source  Midstream Urine       Pathology  I personally reviewed the pathology report  5/7/2019  High grade Ta    5/7/2019  Right upper tract with low grade Ta urothelial tumor which was ablated (no biopsy sent)    She underwent TURBT 2/2015 and  12/2015.  She did receive postoperative mitomycin-C in December 2015.  Right ureteral orifice was involved.  She denies flank pain bilaterally.  Pathology was LGTa.  She denies gross hematuria.  Her last surveillance cystoscopy was 1 year ago.    Assessment  Ms. Augustin is a 58 year old female who is status post TURBT revealing bladder cancer.    We discussed the concepts of recurrence and progression and bladder cancer.  The patient has experienced grade progression since the original resection a few years ago.  I explained that high-grade does place her in the high risk bladder cancer category and as such her follow-up cystoscopy schedule be more frequent.  We also discussed intravesical therapy in the form of chemotherapy versus BCG.  I did recommend induction intravesical treatment, likely gemcitabine.    Plan  Right URS with laser ablation of residual tumor in 3 weeks  Oxybutynin IR 5 mg as needed for bladder spasms  We will schedule her for stent pull the following week.  We will also plan for intravesical therapy -we will discuss this further at the time of stent pull        I spent 28 minutes on this patient's visit and over 50% of this time was spent in face-to-face discussion and counseling regarding the diagnosis of bladder cancer, risks of recurrence and progression, rationale for surveillance with cystoscopy, indications for intravesical therapy, risks and benefits of each option.

## 2019-05-15 ENCOUNTER — TELEPHONE (OUTPATIENT)
Dept: FAMILY MEDICINE | Facility: OTHER | Age: 59
End: 2019-05-15

## 2019-05-15 RX ORDER — FLUCONAZOLE 150 MG/1
150 TABLET ORAL DAILY
Qty: 3 TABLET | Refills: 0 | COMMUNITY
Start: 2019-05-15 | End: 2019-05-22

## 2019-05-15 NOTE — TELEPHONE ENCOUNTER
Pt calls, post TURBT, had catheter removed and now has yeast infection.  Itching , swollen.  Requesting Diflucan to [facility].

## 2019-05-20 NOTE — TELEPHONE ENCOUNTER
Left message on verified phone number with Itzel's comments and offered appt tomorrow, call back if wants an appt.

## 2019-05-20 NOTE — TELEPHONE ENCOUNTER
Perhaps since she had the TURBT surgery, she should update her surgeon on her status.  Should have a wet prep so we know what we are dealing with.  Offer appt    Itzel MANCILLA  [phone]

## 2019-05-20 NOTE — TELEPHONE ENCOUNTER
Pt called back, reports she has finished diflucan and her symptoms have improved slightly. Pt is still experiencing swelling to vaginal area and dysuria. No hematuria, no vaginal discharge. States that the itching has improved. Pt is wondering if she needs to come in for an appt. Please advise. Thank you!

## 2019-05-21 ENCOUNTER — TELEPHONE (OUTPATIENT)
Dept: UROLOGY | Facility: OTHER | Age: 59
End: 2019-05-21

## 2019-05-21 DIAGNOSIS — B37.31 YEAST INFECTION OF THE VAGINA: Primary | ICD-10-CM

## 2019-05-21 RX ORDER — FLUCONAZOLE 150 MG/1
150 TABLET ORAL ONCE
Qty: 1 TABLET | Refills: 1 | Status: SHIPPED | OUTPATIENT
Start: 2019-05-21 | End: 2019-05-22

## 2019-05-21 NOTE — TELEPHONE ENCOUNTER
Pt states that she had surgery last Tues. She still has a yeast infection and is wondering about her scheduled surgery next Tuesday

## 2019-05-21 NOTE — TELEPHONE ENCOUNTER
Right Ureteroscopy, Laser ablation of Tumor scheduled 5/28/19  Dr Walker to address - still ok to have surgery.  Amber Eason LPN.......5/21/2019 3:07 PM

## 2019-05-22 ENCOUNTER — OFFICE VISIT (OUTPATIENT)
Dept: FAMILY MEDICINE | Facility: OTHER | Age: 59
End: 2019-05-22
Attending: NURSE PRACTITIONER
Payer: COMMERCIAL

## 2019-05-22 ENCOUNTER — TELEPHONE (OUTPATIENT)
Dept: FAMILY MEDICINE | Facility: OTHER | Age: 59
End: 2019-05-22

## 2019-05-22 VITALS
BODY MASS INDEX: 25.11 KG/M2 | DIASTOLIC BLOOD PRESSURE: 74 MMHG | HEIGHT: 61 IN | HEART RATE: 72 BPM | OXYGEN SATURATION: 96 % | WEIGHT: 133 LBS | RESPIRATION RATE: 14 BRPM | TEMPERATURE: 97.6 F | SYSTOLIC BLOOD PRESSURE: 122 MMHG

## 2019-05-22 DIAGNOSIS — R10.9 FLANK PAIN: ICD-10-CM

## 2019-05-22 DIAGNOSIS — R30.0 DYSURIA: Primary | ICD-10-CM

## 2019-05-22 DIAGNOSIS — R82.79 ABNORMAL FINDINGS ON MICROBIOLOGICAL EXAMINATION OF URINE: ICD-10-CM

## 2019-05-22 LAB
ALBUMIN SERPL-MCNC: 4.1 G/DL (ref 3.4–5)
ALBUMIN UR-MCNC: 100 MG/DL
ANION GAP SERPL CALCULATED.3IONS-SCNC: 9 MMOL/L (ref 3–14)
APPEARANCE UR: ABNORMAL
BACTERIA #/AREA URNS HPF: ABNORMAL /HPF
BASOPHILS # BLD AUTO: 0.1 10E9/L (ref 0–0.2)
BASOPHILS NFR BLD AUTO: 0.6 %
BILIRUB UR QL STRIP: NEGATIVE
BUN SERPL-MCNC: 20 MG/DL (ref 7–30)
CALCIUM SERPL-MCNC: 9 MG/DL (ref 8.5–10.1)
CHLORIDE SERPL-SCNC: 111 MMOL/L (ref 94–109)
CO2 SERPL-SCNC: 23 MMOL/L (ref 20–32)
COLOR UR AUTO: YELLOW
CREAT SERPL-MCNC: 0.74 MG/DL (ref 0.52–1.04)
DIFFERENTIAL METHOD BLD: NORMAL
EOSINOPHIL # BLD AUTO: 0.2 10E9/L (ref 0–0.7)
EOSINOPHIL NFR BLD AUTO: 2.3 %
ERYTHROCYTE [DISTWIDTH] IN BLOOD BY AUTOMATED COUNT: 12.7 % (ref 10–15)
GFR SERPL CREATININE-BSD FRML MDRD: 89 ML/MIN/{1.73_M2}
GLUCOSE SERPL-MCNC: 81 MG/DL (ref 70–99)
GLUCOSE UR STRIP-MCNC: NEGATIVE MG/DL
HCT VFR BLD AUTO: 39.8 % (ref 35–47)
HGB BLD-MCNC: 13.5 G/DL (ref 11.7–15.7)
HGB UR QL STRIP: ABNORMAL
KETONES UR STRIP-MCNC: NEGATIVE MG/DL
LEUKOCYTE ESTERASE UR QL STRIP: ABNORMAL
LYMPHOCYTES # BLD AUTO: 2.6 10E9/L (ref 0.8–5.3)
LYMPHOCYTES NFR BLD AUTO: 29.9 %
MCH RBC QN AUTO: 32.1 PG (ref 26.5–33)
MCHC RBC AUTO-ENTMCNC: 33.9 G/DL (ref 31.5–36.5)
MCV RBC AUTO: 95 FL (ref 78–100)
MONOCYTES # BLD AUTO: 0.7 10E9/L (ref 0–1.3)
MONOCYTES NFR BLD AUTO: 8.1 %
NEUTROPHILS # BLD AUTO: 5.1 10E9/L (ref 1.6–8.3)
NEUTROPHILS NFR BLD AUTO: 59.1 %
NITRATE UR QL: NEGATIVE
NON-SQ EPI CELLS #/AREA URNS LPF: ABNORMAL /LPF
PH UR STRIP: 5.5 PH (ref 5–7)
PHOSPHATE SERPL-MCNC: 3.7 MG/DL (ref 2.5–4.5)
PLATELET # BLD AUTO: 239 10E9/L (ref 150–450)
POTASSIUM SERPL-SCNC: 4.1 MMOL/L (ref 3.4–5.3)
RBC # BLD AUTO: 4.2 10E12/L (ref 3.8–5.2)
RBC #/AREA URNS AUTO: ABNORMAL /HPF
SODIUM SERPL-SCNC: 143 MMOL/L (ref 133–144)
SOURCE: ABNORMAL
SP GR UR STRIP: >1.03 (ref 1–1.03)
UROBILINOGEN UR STRIP-ACNC: 0.2 EU/DL (ref 0.2–1)
WBC # BLD AUTO: 8.6 10E9/L (ref 4–11)
WBC #/AREA URNS AUTO: ABNORMAL /HPF

## 2019-05-22 PROCEDURE — 85025 COMPLETE CBC W/AUTO DIFF WBC: CPT | Performed by: NURSE PRACTITIONER

## 2019-05-22 PROCEDURE — 87086 URINE CULTURE/COLONY COUNT: CPT | Performed by: NURSE PRACTITIONER

## 2019-05-22 PROCEDURE — 36415 COLL VENOUS BLD VENIPUNCTURE: CPT | Performed by: NURSE PRACTITIONER

## 2019-05-22 PROCEDURE — 81001 URINALYSIS AUTO W/SCOPE: CPT | Performed by: NURSE PRACTITIONER

## 2019-05-22 PROCEDURE — 99213 OFFICE O/P EST LOW 20 MIN: CPT | Performed by: NURSE PRACTITIONER

## 2019-05-22 PROCEDURE — 80069 RENAL FUNCTION PANEL: CPT | Performed by: NURSE PRACTITIONER

## 2019-05-22 RX ORDER — CIPROFLOXACIN 500 MG/1
500 TABLET, FILM COATED ORAL 2 TIMES DAILY
Qty: 14 TABLET | Refills: 0 | Status: SHIPPED | OUTPATIENT
Start: 2019-05-22 | End: 2019-06-03

## 2019-05-22 RX ORDER — CEFTRIAXONE SODIUM 1 G
1 VIAL (EA) INJECTION ONCE
Status: CANCELLED | OUTPATIENT
Start: 2019-05-22 | End: 2019-05-22

## 2019-05-22 ASSESSMENT — MIFFLIN-ST. JEOR: SCORE: 1120.66

## 2019-05-22 ASSESSMENT — PAIN SCALES - GENERAL: PAINLEVEL: EXTREME PAIN (8)

## 2019-05-22 NOTE — H&P (VIEW-ONLY)
Kait Augustin is a 58 year old female who presents to clinic today for the following health issues:      URINARY TRACT SYMPTOMS  Post surgery 2 weeks ago  Onset: Tuesday    Description:     Frequence YES  Painful urination (Dysuria): YES  Blood in urine (Hematuria): no   Delay in urine (Hesitency): no     Intensity: moderate    Progression of Symptoms:  worsening  Accompanying Signs & Symptoms:  Swelling and hurts to wipe  Fever/chills: no   Flank pain YES  Nausea and vomiting: no   Any vaginal symptoms: none  Abdominal/Pelvic Pain: YES- pelvic pain    History:   History of frequent UTI's: YES  History of kidney stones: no   Sexually Active: no   Possibility of pregnancy: No    Precipitating factors:   none  Therapies Tried and outcome: Increase fluid intake and OTC advil or tylenol       Patient Active Problem List   Diagnosis     H/O renal calculi     Malignant neoplasm of overlapping sites of bladder (H)     ACP (advance care planning)     Past Surgical History:   Procedure Laterality Date     C LIGATE FALLOPIAN TUBE  1996     CYSTOSCOPY, RETROGRADES, INSERT STENT URETER(S), COMBINED Right 12/8/2015    Procedure: COMBINED CYSTOSCOPY, RETROGRADES, INSERT STENT URETER(S);  Surgeon: Efren Couch MD;  Location: [facility]     CYSTOSCOPY, TRANSURETHRAL RESECTION (TUR) PROSTATE, COMBINED Bilateral 5/7/2019    Procedure: Transurethral Resection of Bladder Tumor, BILATERAL RETROGRADE PYELOGRAMS, RIGHT URETEROSCOPY WITH HOLMIUM LASER TUMOR ABLATION AND STENT PLACEMENT;  Surgeon: Delfino Walker MD;  Location:  OR     CYSTOSCOPY, TRANSURETHRAL RESECTION (TUR) TUMOR BLADDER, COMBINED N/A 2/10/2015    Procedure: COMBINED CYSTOSCOPY, TRANSURETHRAL RESECTION (TUR) TUMOR BLADDER;  Surgeon: Efren Couch MD;  Location: [facility]     CYSTOSCOPY, TRANSURETHRAL RESECTION (TUR) TUMOR BLADDER, COMBINED N/A 12/8/2015    Procedure: COMBINED CYSTOSCOPY, TRANSURETHRAL RESECTION (TUR) TUMOR BLADDER;  Surgeon:  Weight, Efren Fowler MD;  Location: [facility]       Social History     Tobacco Use     Smoking status: Smoker, Current Status Unknown     Packs/day: 1.00     Years: 45.00     Pack years: 45.00     Types: Cigarettes     Smokeless tobacco: Never Used   Substance Use Topics     Alcohol use: Yes     Comment: rare     Family History   Problem Relation Age of Onset     C.A.D. Father         dx late 60's, CABG, angioplasty     Cancer Father         Lung, dx age 80 h/o tobacco use     Heart Disease Mother      Thyroid Disease Mother      Lipids Sister          Current Outpatient Medications   Medication Sig Dispense Refill     ciprofloxacin (CIPRO) 500 MG tablet Take 1 tablet (500 mg) by mouth 2 times daily for 7 days 14 tablet 0     oxybutynin (DITROPAN) 5 MG tablet Take 1 tablet (5 mg) by mouth 3 times daily (Patient not taking: Reported on 5/22/2019) 60 tablet 1     Allergies   Allergen Reactions     No Known Allergies      Recent Labs   Lab Test 05/01/19  1116 03/13/19  1127  11/17/14  1052   LDL  --   --   --  180*   HDL  --   --   --  65   TRIG  --   --   --  128   ALT 27 33  --   --    CR 0.70 0.71   < > 0.71   GFRESTIMATED >90 >90   < > 86   GFRESTBLACK >90 >90   < > >90  African American GFR Calc     POTASSIUM 4.2 4.1   < > 3.9   TSH  --   --   --  1.00    < > = values in this interval not displayed.      BP Readings from Last 3 Encounters:   05/22/19 122/74   05/13/19 120/80   05/07/19 119/68    Wt Readings from Last 3 Encounters:   05/22/19 60.3 kg (133 lb)   05/13/19 60.6 kg (133 lb 9.6 oz)   05/07/19 61.2 kg (135 lb)               Reviewed and updated as needed this visit by Provider         Review of Systems   ROS COMP: Constitutional, HEENT, cardiovascular, pulmonary, gi and gu systems are negative, except as otherwise noted.        Objective    /74 (BP Location: Left arm, Patient Position: Sitting, Cuff Size: Adult Regular)   Pulse 72   Temp 97.6  F (36.4  C) (Tympanic)   Resp 14   Ht 1.549 m (5'  "1\")   Wt 60.3 kg (133 lb)   LMP 06/18/2005   SpO2 96%   BMI 25.13 kg/m    Body mass index is 25.13 kg/m .     Physical Exam   GENERAL: healthy, alert and no distress  EYES: Eyes grossly normal to inspection, PERRL and conjunctivae and sclerae normal  NECK: no adenopathy, no asymmetry, masses, or scars and thyroid normal to palpation  RESP: lungs clear to auscultation - no rales, rhonchi or wheezes  CV: regular rate and rhythm, normal S1 S2, no S3 or S4, no murmur, click or rub, no peripheral edema and peripheral pulses strong  ABDOMEN: soft, nontender, no hepatosplenomegaly, no masses and bowel sounds normal  MS: right flank pan   SKIN: no suspicious lesions or rashes  PSYCH: mentation appears normal, affect normal/bright    Diagnostic Test Results:  Results for orders placed or performed in visit on 05/22/19 (from the past 24 hour(s))   CBC with platelets and differential   Result Value Ref Range    WBC 8.6 4.0 - 11.0 10e9/L    RBC Count 4.20 3.8 - 5.2 10e12/L    Hemoglobin 13.5 11.7 - 15.7 g/dL    Hematocrit 39.8 35.0 - 47.0 %    MCV 95 78 - 100 fl    MCH 32.1 26.5 - 33.0 pg    MCHC 33.9 31.5 - 36.5 g/dL    RDW 12.7 10.0 - 15.0 %    Platelet Count 239 150 - 450 10e9/L    % Neutrophils 59.1 %    % Lymphocytes 29.9 %    % Monocytes 8.1 %    % Eosinophils 2.3 %    % Basophils 0.6 %    Absolute Neutrophil 5.1 1.6 - 8.3 10e9/L    Absolute Lymphocytes 2.6 0.8 - 5.3 10e9/L    Absolute Monocytes 0.7 0.0 - 1.3 10e9/L    Absolute Eosinophils 0.2 0.0 - 0.7 10e9/L    Absolute Basophils 0.1 0.0 - 0.2 10e9/L    Diff Method Automated Method    *UA reflex to Microscopic and Culture - [facility]   Result Value Ref Range    Color Urine Yellow     Appearance Urine Slightly Cloudy     Glucose Urine Negative NEG^Negative mg/dL    Bilirubin Urine Negative NEG^Negative    Ketones Urine Negative NEG^Negative mg/dL    Specific Gravity Urine >1.030 1.003 - 1.035    Blood Urine Large (A) NEG^Negative    pH Urine 5.5 5.0 - 7.0 pH " "   Protein Albumin Urine 100 (A) NEG^Negative mg/dL    Urobilinogen Urine 0.2 0.2 - 1.0 EU/dL    Nitrite Urine Negative NEG^Negative    Leukocyte Esterase Urine Small (A) NEG^Negative    Source Midstream Urine    Urine Microscopic   Result Value Ref Range    WBC Urine 10-25 (A) OTO5^0 - 5 /HPF    RBC Urine 10-25 (A) OTO2^O - 2 /HPF    Squamous Epithelial /LPF Urine Few FEW^Few /LPF    Bacteria Urine Few (A) NEG^Negative /HPF           Assessment & Plan       1. Dysuria  - CBC with platelets and differential; Future  - UA reflex to Microscopic and Culture - [facility]/[place]  - CBC with platelets and differential  - Urine Microscopic  - ciprofloxacin (CIPRO) 500 MG tablet; Take 1 tablet (500 mg) by mouth 2 times daily for 7 days  Dispense: 14 tablet; Refill: 0    2. Abnormal findings on microbiological examination of urine  - Urine Culture Aerobic Bacterial  - Renal panel  - ciprofloxacin (CIPRO) 500 MG tablet; Take 1 tablet (500 mg) by mouth 2 times daily for 7 days  Dispense: 14 tablet; Refill: 0  - Rocephin 1 G IM given in clinic    3. Flank pain  - Renal panel        BMI:   Estimated body mass index is 25.13 kg/m  as calculated from the following:    Height as of this encounter: 1.549 m (5' 1\").    Weight as of this encounter: 60.3 kg (133 lb).       Return if symptoms worsen or fail to improve.   Follow-up with Dr Walker as scheduled        Itzel Phillip NP  [facility]    "

## 2019-05-22 NOTE — RESULT ENCOUNTER NOTE
Renal function normal  UC pending    Itzel Phillip [facility]  [phone]

## 2019-05-22 NOTE — PROGRESS NOTES
Kait Augustin is a 58 year old female who presents to clinic today for the following health issues:      URINARY TRACT SYMPTOMS  Post surgery 2 weeks ago  Onset: Tuesday    Description:     Frequence YES  Painful urination (Dysuria): YES  Blood in urine (Hematuria): no   Delay in urine (Hesitency): no     Intensity: moderate    Progression of Symptoms:  worsening  Accompanying Signs & Symptoms:  Swelling and hurts to wipe  Fever/chills: no   Flank pain YES  Nausea and vomiting: no   Any vaginal symptoms: none  Abdominal/Pelvic Pain: YES- pelvic pain    History:   History of frequent UTI's: YES  History of kidney stones: no   Sexually Active: no   Possibility of pregnancy: No    Precipitating factors:   none  Therapies Tried and outcome: Increase fluid intake and OTC advil or tylenol       Patient Active Problem List   Diagnosis     H/O renal calculi     Malignant neoplasm of overlapping sites of bladder (H)     ACP (advance care planning)     Past Surgical History:   Procedure Laterality Date     C LIGATE FALLOPIAN TUBE  1996     CYSTOSCOPY, RETROGRADES, INSERT STENT URETER(S), COMBINED Right 12/8/2015    Procedure: COMBINED CYSTOSCOPY, RETROGRADES, INSERT STENT URETER(S);  Surgeon: Efren Couch MD;  Location: [facility]     CYSTOSCOPY, TRANSURETHRAL RESECTION (TUR) PROSTATE, COMBINED Bilateral 5/7/2019    Procedure: Transurethral Resection of Bladder Tumor, BILATERAL RETROGRADE PYELOGRAMS, RIGHT URETEROSCOPY WITH HOLMIUM LASER TUMOR ABLATION AND STENT PLACEMENT;  Surgeon: Delfino Walker MD;  Location:  OR     CYSTOSCOPY, TRANSURETHRAL RESECTION (TUR) TUMOR BLADDER, COMBINED N/A 2/10/2015    Procedure: COMBINED CYSTOSCOPY, TRANSURETHRAL RESECTION (TUR) TUMOR BLADDER;  Surgeon: Efren Couch MD;  Location: [facility]     CYSTOSCOPY, TRANSURETHRAL RESECTION (TUR) TUMOR BLADDER, COMBINED N/A 12/8/2015    Procedure: COMBINED CYSTOSCOPY, TRANSURETHRAL RESECTION (TUR) TUMOR BLADDER;  Surgeon:  Weight, Efren Fowler MD;  Location: [facility]       Social History     Tobacco Use     Smoking status: Smoker, Current Status Unknown     Packs/day: 1.00     Years: 45.00     Pack years: 45.00     Types: Cigarettes     Smokeless tobacco: Never Used   Substance Use Topics     Alcohol use: Yes     Comment: rare     Family History   Problem Relation Age of Onset     C.A.D. Father         dx late 60's, CABG, angioplasty     Cancer Father         Lung, dx age 80 h/o tobacco use     Heart Disease Mother      Thyroid Disease Mother      Lipids Sister          Current Outpatient Medications   Medication Sig Dispense Refill     ciprofloxacin (CIPRO) 500 MG tablet Take 1 tablet (500 mg) by mouth 2 times daily for 7 days 14 tablet 0     oxybutynin (DITROPAN) 5 MG tablet Take 1 tablet (5 mg) by mouth 3 times daily (Patient not taking: Reported on 5/22/2019) 60 tablet 1     Allergies   Allergen Reactions     No Known Allergies      Recent Labs   Lab Test 05/01/19  1116 03/13/19  1127  11/17/14  1052   LDL  --   --   --  180*   HDL  --   --   --  65   TRIG  --   --   --  128   ALT 27 33  --   --    CR 0.70 0.71   < > 0.71   GFRESTIMATED >90 >90   < > 86   GFRESTBLACK >90 >90   < > >90  African American GFR Calc     POTASSIUM 4.2 4.1   < > 3.9   TSH  --   --   --  1.00    < > = values in this interval not displayed.      BP Readings from Last 3 Encounters:   05/22/19 122/74   05/13/19 120/80   05/07/19 119/68    Wt Readings from Last 3 Encounters:   05/22/19 60.3 kg (133 lb)   05/13/19 60.6 kg (133 lb 9.6 oz)   05/07/19 61.2 kg (135 lb)               Reviewed and updated as needed this visit by Provider         Review of Systems   ROS COMP: Constitutional, HEENT, cardiovascular, pulmonary, gi and gu systems are negative, except as otherwise noted.        Objective    /74 (BP Location: Left arm, Patient Position: Sitting, Cuff Size: Adult Regular)   Pulse 72   Temp 97.6  F (36.4  C) (Tympanic)   Resp 14   Ht 1.549 m (5'  "1\")   Wt 60.3 kg (133 lb)   LMP 06/18/2005   SpO2 96%   BMI 25.13 kg/m    Body mass index is 25.13 kg/m .     Physical Exam   GENERAL: healthy, alert and no distress  EYES: Eyes grossly normal to inspection, PERRL and conjunctivae and sclerae normal  NECK: no adenopathy, no asymmetry, masses, or scars and thyroid normal to palpation  RESP: lungs clear to auscultation - no rales, rhonchi or wheezes  CV: regular rate and rhythm, normal S1 S2, no S3 or S4, no murmur, click or rub, no peripheral edema and peripheral pulses strong  ABDOMEN: soft, nontender, no hepatosplenomegaly, no masses and bowel sounds normal  MS: right flank pan   SKIN: no suspicious lesions or rashes  PSYCH: mentation appears normal, affect normal/bright    Diagnostic Test Results:  Results for orders placed or performed in visit on 05/22/19 (from the past 24 hour(s))   CBC with platelets and differential   Result Value Ref Range    WBC 8.6 4.0 - 11.0 10e9/L    RBC Count 4.20 3.8 - 5.2 10e12/L    Hemoglobin 13.5 11.7 - 15.7 g/dL    Hematocrit 39.8 35.0 - 47.0 %    MCV 95 78 - 100 fl    MCH 32.1 26.5 - 33.0 pg    MCHC 33.9 31.5 - 36.5 g/dL    RDW 12.7 10.0 - 15.0 %    Platelet Count 239 150 - 450 10e9/L    % Neutrophils 59.1 %    % Lymphocytes 29.9 %    % Monocytes 8.1 %    % Eosinophils 2.3 %    % Basophils 0.6 %    Absolute Neutrophil 5.1 1.6 - 8.3 10e9/L    Absolute Lymphocytes 2.6 0.8 - 5.3 10e9/L    Absolute Monocytes 0.7 0.0 - 1.3 10e9/L    Absolute Eosinophils 0.2 0.0 - 0.7 10e9/L    Absolute Basophils 0.1 0.0 - 0.2 10e9/L    Diff Method Automated Method    *UA reflex to Microscopic and Culture - [facility]   Result Value Ref Range    Color Urine Yellow     Appearance Urine Slightly Cloudy     Glucose Urine Negative NEG^Negative mg/dL    Bilirubin Urine Negative NEG^Negative    Ketones Urine Negative NEG^Negative mg/dL    Specific Gravity Urine >1.030 1.003 - 1.035    Blood Urine Large (A) NEG^Negative    pH Urine 5.5 5.0 - 7.0 pH " "   Protein Albumin Urine 100 (A) NEG^Negative mg/dL    Urobilinogen Urine 0.2 0.2 - 1.0 EU/dL    Nitrite Urine Negative NEG^Negative    Leukocyte Esterase Urine Small (A) NEG^Negative    Source Midstream Urine    Urine Microscopic   Result Value Ref Range    WBC Urine 10-25 (A) OTO5^0 - 5 /HPF    RBC Urine 10-25 (A) OTO2^O - 2 /HPF    Squamous Epithelial /LPF Urine Few FEW^Few /LPF    Bacteria Urine Few (A) NEG^Negative /HPF           Assessment & Plan       1. Dysuria  - CBC with platelets and differential; Future  - UA reflex to Microscopic and Culture - [facility]/[place]  - CBC with platelets and differential  - Urine Microscopic  - ciprofloxacin (CIPRO) 500 MG tablet; Take 1 tablet (500 mg) by mouth 2 times daily for 7 days  Dispense: 14 tablet; Refill: 0    2. Abnormal findings on microbiological examination of urine  - Urine Culture Aerobic Bacterial  - Renal panel  - ciprofloxacin (CIPRO) 500 MG tablet; Take 1 tablet (500 mg) by mouth 2 times daily for 7 days  Dispense: 14 tablet; Refill: 0  - Rocephin 1 G IM given in clinic    3. Flank pain  - Renal panel        BMI:   Estimated body mass index is 25.13 kg/m  as calculated from the following:    Height as of this encounter: 1.549 m (5' 1\").    Weight as of this encounter: 60.3 kg (133 lb).       Return if symptoms worsen or fail to improve.   Follow-up with Dr Walker as scheduled        Itzel Phillip NP  [facility] - [facility]    "

## 2019-05-22 NOTE — TELEPHONE ENCOUNTER
Pt called and is having following symptoms burning with urination and right flank pain has surgery on 5/28/18 needs to be seen to make sure no uti   Pamela M Lechevalier LPN

## 2019-05-22 NOTE — NURSING NOTE
"Chief Complaint   Patient presents with     Urinary Problem       Initial /74 (BP Location: Left arm, Patient Position: Sitting, Cuff Size: Adult Regular)   Pulse 72   Temp 97.6  F (36.4  C) (Tympanic)   Resp 14   Ht 1.549 m (5' 1\")   Wt 60.3 kg (133 lb)   LMP 06/18/2005   SpO2 96%   BMI 25.13 kg/m   Estimated body mass index is 25.13 kg/m  as calculated from the following:    Height as of this encounter: 1.549 m (5' 1\").    Weight as of this encounter: 60.3 kg (133 lb).  Medication Reconciliation: complete    Sandra Harris LPN    "

## 2019-05-22 NOTE — PATIENT INSTRUCTIONS
Results for orders placed or performed in visit on 05/22/19 (from the past 24 hour(s))   CBC with platelets and differential   Result Value Ref Range    WBC 8.6 4.0 - 11.0 10e9/L    RBC Count 4.20 3.8 - 5.2 10e12/L    Hemoglobin 13.5 11.7 - 15.7 g/dL    Hematocrit 39.8 35.0 - 47.0 %    MCV 95 78 - 100 fl    MCH 32.1 26.5 - 33.0 pg    MCHC 33.9 31.5 - 36.5 g/dL    RDW 12.7 10.0 - 15.0 %    Platelet Count 239 150 - 450 10e9/L    % Neutrophils 59.1 %    % Lymphocytes 29.9 %    % Monocytes 8.1 %    % Eosinophils 2.3 %    % Basophils 0.6 %    Absolute Neutrophil 5.1 1.6 - 8.3 10e9/L    Absolute Lymphocytes 2.6 0.8 - 5.3 10e9/L    Absolute Monocytes 0.7 0.0 - 1.3 10e9/L    Absolute Eosinophils 0.2 0.0 - 0.7 10e9/L    Absolute Basophils 0.1 0.0 - 0.2 10e9/L    Diff Method Automated Method    *UA reflex to Microscopic and Culture - [facility]   Result Value Ref Range    Color Urine Yellow     Appearance Urine Slightly Cloudy     Glucose Urine Negative NEG^Negative mg/dL    Bilirubin Urine Negative NEG^Negative    Ketones Urine Negative NEG^Negative mg/dL    Specific Gravity Urine >1.030 1.003 - 1.035    Blood Urine Large (A) NEG^Negative    pH Urine 5.5 5.0 - 7.0 pH    Protein Albumin Urine 100 (A) NEG^Negative mg/dL    Urobilinogen Urine 0.2 0.2 - 1.0 EU/dL    Nitrite Urine Negative NEG^Negative    Leukocyte Esterase Urine Small (A) NEG^Negative    Source Midstream Urine    Urine Microscopic   Result Value Ref Range    WBC Urine 10-25 (A) OTO5^0 - 5 /HPF    RBC Urine 10-25 (A) OTO2^O - 2 /HPF    Squamous Epithelial /LPF Urine Few FEW^Few /LPF    Bacteria Urine Few (A) NEG^Negative /HPF       Assessment & Plan       1. Dysuria  - CBC with platelets and differential; Future  - UA reflex to Microscopic and Culture - [facility]  - CBC with platelets and differential  - Urine Microscopic  - ciprofloxacin (CIPRO) 500 MG tablet; Take 1 tablet (500 mg) by mouth 2 times daily for 7 days  Dispense: 14 tablet; Refill:  "0    2. Abnormal findings on microbiological examination of urine  - Urine Culture Aerobic Bacterial  - Renal panel  - ciprofloxacin (CIPRO) 500 MG tablet; Take 1 tablet (500 mg) by mouth 2 times daily for 7 days  Dispense: 14 tablet; Refill: 0  - Rocephin 1 G IM given in clinic    3. Flank pain  - Renal panel            BMI:   Estimated body mass index is 25.13 kg/m  as calculated from the following:    Height as of this encounter: 1.549 m (5' 1\").    Weight as of this encounter: 60.3 kg (133 lb).       Return if symptoms worsen or fail to improve.   Follow-up with Dr Walker as scheduled        Itzel Phillip NP  [facility]      "

## 2019-05-24 ENCOUNTER — ANESTHESIA EVENT (OUTPATIENT)
Dept: SURGERY | Facility: OTHER | Age: 59
End: 2019-05-24
Payer: COMMERCIAL

## 2019-05-24 ENCOUNTER — TELEPHONE (OUTPATIENT)
Dept: UROLOGY | Facility: OTHER | Age: 59
End: 2019-05-24

## 2019-05-24 LAB
BACTERIA SPEC CULT: NORMAL
SPECIMEN SOURCE: NORMAL

## 2019-05-24 RX ORDER — CEFTRIAXONE SODIUM 1 G
1 VIAL (EA) INJECTION ONCE
Status: COMPLETED | OUTPATIENT
Start: 2019-05-24 | End: 2019-05-24

## 2019-05-24 RX ADMIN — Medication 1 G: at 12:21

## 2019-05-24 NOTE — TELEPHONE ENCOUNTER
"Per Delfino Walker MD: \"She should still show up for surgery as we planned.   The culture was negative and so she likely does not have an infection.\"  Patient notified of this.  Mariza Rubin RN......May 24, 2019...2:05 PM   "

## 2019-05-24 NOTE — PROGRESS NOTES
Prior to injection, verified patient identity using patient's name and date of birth.  Due to injection administration, patient instructed to remain in clinic for 15 minutes  afterwards, and to report any adverse reaction to me immediately.    Rocephin    Drug Amount Wasted:  None.  Vial/Syringe: Single dose vial  Expiration Date:  6/2021    Janis Rubin

## 2019-05-24 NOTE — TELEPHONE ENCOUNTER
Patient was in to see her PCP on 5/22/19 and she says she has a kidney infection.  Shot of rocephin given and patient started on a 7 day course of Ciprofloxacin.  Ureteroscopy with laser ablation of tumor in right ureter scheduled for 5/28/19.  This will be day 6 of Cipro.  Please advise if she should push back surgery or not.  Mariza Rubin RN......May 24, 2019...12:26 PM

## 2019-05-24 NOTE — TELEPHONE ENCOUNTER
Patient states kidney pain and would like to know if they should go ahead with 5-28-19 Surgery. Please call.

## 2019-05-24 NOTE — RESULT ENCOUNTER NOTE
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

## 2019-05-28 ENCOUNTER — ANESTHESIA (OUTPATIENT)
Dept: SURGERY | Facility: OTHER | Age: 59
End: 2019-05-28
Payer: COMMERCIAL

## 2019-05-28 ENCOUNTER — HOSPITAL ENCOUNTER (OUTPATIENT)
Facility: OTHER | Age: 59
Discharge: HOME | End: 2019-05-28
Attending: UROLOGY | Admitting: UROLOGY
Payer: COMMERCIAL

## 2019-05-28 ENCOUNTER — HOSPITAL ENCOUNTER (OUTPATIENT)
Dept: GENERAL RADIOLOGY | Facility: OTHER | Age: 59
End: 2019-05-28
Attending: UROLOGY | Admitting: UROLOGY
Payer: COMMERCIAL

## 2019-05-28 VITALS
BODY MASS INDEX: 25.11 KG/M2 | HEIGHT: 61 IN | DIASTOLIC BLOOD PRESSURE: 71 MMHG | OXYGEN SATURATION: 96 % | HEART RATE: 59 BPM | RESPIRATION RATE: 16 BRPM | TEMPERATURE: 96.9 F | SYSTOLIC BLOOD PRESSURE: 124 MMHG | WEIGHT: 133 LBS

## 2019-05-28 DIAGNOSIS — Z08 ENCOUNTER FOR FOLLOW-UP SURVEILLANCE OF UROTHELIAL CARCINOMA OF UPPER URINARY TRACT: Primary | ICD-10-CM

## 2019-05-28 DIAGNOSIS — Z85.50 ENCOUNTER FOR FOLLOW-UP SURVEILLANCE OF UROTHELIAL CARCINOMA OF UPPER URINARY TRACT: Primary | ICD-10-CM

## 2019-05-28 DIAGNOSIS — D49.59 URETERAL TUMOR: ICD-10-CM

## 2019-05-28 PROCEDURE — 27210794 ZZH OR GENERAL SUPPLY STERILE: Performed by: UROLOGY

## 2019-05-28 PROCEDURE — 36000054 ZZH SURGERY LEVEL 2 W FLUORO 1ST 30 MIN: Performed by: UROLOGY

## 2019-05-28 PROCEDURE — C2617 STENT, NON-COR, TEM W/O DEL: HCPCS | Performed by: UROLOGY

## 2019-05-28 PROCEDURE — 25800030 ZZH RX IP 258 OP 636: Performed by: NURSE ANESTHETIST, CERTIFIED REGISTERED

## 2019-05-28 PROCEDURE — 25000125 ZZHC RX 250: Performed by: UROLOGY

## 2019-05-28 PROCEDURE — 76499 UNLISTED DX RADIOGRAPHIC PX: CPT

## 2019-05-28 PROCEDURE — 25000128 H RX IP 250 OP 636: Performed by: NURSE ANESTHETIST, CERTIFIED REGISTERED

## 2019-05-28 PROCEDURE — 52354 CYSTOURETERO W/BIOPSY: CPT | Performed by: NURSE ANESTHETIST, CERTIFIED REGISTERED

## 2019-05-28 PROCEDURE — C1758 CATHETER, URETERAL: HCPCS | Performed by: UROLOGY

## 2019-05-28 PROCEDURE — 74420 UROGRAPHY RTRGR +-KUB: CPT | Mod: 26 | Performed by: UROLOGY

## 2019-05-28 PROCEDURE — 71000014 ZZH RECOVERY PHASE 1 LEVEL 2 FIRST HR: Performed by: UROLOGY

## 2019-05-28 PROCEDURE — 71000027 ZZH RECOVERY PHASE 2 EACH 15 MINS: Performed by: UROLOGY

## 2019-05-28 PROCEDURE — 25000125 ZZHC RX 250: Performed by: NURSE ANESTHETIST, CERTIFIED REGISTERED

## 2019-05-28 PROCEDURE — 27211024 ZZHC OR SUPPLY OTHER OPNP: Performed by: UROLOGY

## 2019-05-28 PROCEDURE — 25000128 H RX IP 250 OP 636: Performed by: UROLOGY

## 2019-05-28 PROCEDURE — 74420 UROGRAPHY RTRGR +-KUB: CPT

## 2019-05-28 PROCEDURE — 36000052 ZZH SURGERY LEVEL 2 EA 15 ADDTL MIN: Performed by: UROLOGY

## 2019-05-28 PROCEDURE — C1894 INTRO/SHEATH, NON-LASER: HCPCS | Performed by: UROLOGY

## 2019-05-28 PROCEDURE — 52332 CYSTOSCOPY AND TREATMENT: CPT | Performed by: UROLOGY

## 2019-05-28 PROCEDURE — 37000008 ZZH ANESTHESIA TECHNICAL FEE, 1ST 30 MIN: Performed by: UROLOGY

## 2019-05-28 PROCEDURE — 25500064 ZZH RX 255 OP 636: Performed by: UROLOGY

## 2019-05-28 PROCEDURE — C1769 GUIDE WIRE: HCPCS | Performed by: UROLOGY

## 2019-05-28 PROCEDURE — 40000306 ZZH STATISTIC PRE PROC ASSESS II: Performed by: UROLOGY

## 2019-05-28 PROCEDURE — 52354 CYSTOURETERO W/BIOPSY: CPT | Performed by: UROLOGY

## 2019-05-28 PROCEDURE — 37000009 ZZH ANESTHESIA TECHNICAL FEE, EACH ADDTL 15 MIN: Performed by: UROLOGY

## 2019-05-28 DEVICE — STENT URETERAL CONTOUR SOFT PERCUFLEX 6FRX22CM: Type: IMPLANTABLE DEVICE | Site: URETER | Status: FUNCTIONAL

## 2019-05-28 RX ORDER — CEFTRIAXONE SODIUM 1 G/50ML
1 INJECTION, SOLUTION INTRAVENOUS
Status: COMPLETED | OUTPATIENT
Start: 2019-05-28 | End: 2019-05-28

## 2019-05-28 RX ORDER — DEXAMETHASONE SODIUM PHOSPHATE 4 MG/ML
INJECTION, SOLUTION INTRA-ARTICULAR; INTRALESIONAL; INTRAMUSCULAR; INTRAVENOUS; SOFT TISSUE PRN
Status: DISCONTINUED | OUTPATIENT
Start: 2019-05-28 | End: 2019-05-28

## 2019-05-28 RX ORDER — KETAMINE HYDROCHLORIDE 10 MG/ML
INJECTION INTRAMUSCULAR; INTRAVENOUS PRN
Status: DISCONTINUED | OUTPATIENT
Start: 2019-05-28 | End: 2019-05-28

## 2019-05-28 RX ORDER — SODIUM CHLORIDE 9 MG/ML
INJECTION, SOLUTION INTRAVENOUS CONTINUOUS
Status: DISCONTINUED | OUTPATIENT
Start: 2019-05-28 | End: 2019-05-28 | Stop reason: HOSPADM

## 2019-05-28 RX ORDER — LIDOCAINE HYDROCHLORIDE 20 MG/ML
INJECTION, SOLUTION INFILTRATION; PERINEURAL PRN
Status: DISCONTINUED | OUTPATIENT
Start: 2019-05-28 | End: 2019-05-28

## 2019-05-28 RX ORDER — OXYCODONE AND ACETAMINOPHEN 5; 325 MG/1; MG/1
1-2 TABLET ORAL EVERY 6 HOURS PRN
Qty: 20 TABLET | Refills: 0 | Status: SHIPPED | OUTPATIENT
Start: 2019-05-28 | End: 2019-07-29

## 2019-05-28 RX ORDER — ONDANSETRON 2 MG/ML
4 INJECTION INTRAMUSCULAR; INTRAVENOUS EVERY 30 MIN PRN
Status: DISCONTINUED | OUTPATIENT
Start: 2019-05-28 | End: 2019-05-28 | Stop reason: HOSPADM

## 2019-05-28 RX ORDER — PROPOFOL 10 MG/ML
INJECTION, EMULSION INTRAVENOUS PRN
Status: DISCONTINUED | OUTPATIENT
Start: 2019-05-28 | End: 2019-05-28

## 2019-05-28 RX ORDER — PROPOFOL 10 MG/ML
INJECTION, EMULSION INTRAVENOUS CONTINUOUS PRN
Status: DISCONTINUED | OUTPATIENT
Start: 2019-05-28 | End: 2019-05-28

## 2019-05-28 RX ORDER — LIDOCAINE 40 MG/G
CREAM TOPICAL
Status: DISCONTINUED | OUTPATIENT
Start: 2019-05-28 | End: 2019-05-28 | Stop reason: HOSPADM

## 2019-05-28 RX ORDER — FENTANYL CITRATE 50 UG/ML
25-50 INJECTION, SOLUTION INTRAMUSCULAR; INTRAVENOUS EVERY 5 MIN PRN
Status: DISCONTINUED | OUTPATIENT
Start: 2019-05-28 | End: 2019-05-28 | Stop reason: HOSPADM

## 2019-05-28 RX ORDER — MEPERIDINE HYDROCHLORIDE 50 MG/ML
12.5 INJECTION INTRAMUSCULAR; INTRAVENOUS; SUBCUTANEOUS
Status: DISCONTINUED | OUTPATIENT
Start: 2019-05-28 | End: 2019-05-28 | Stop reason: HOSPADM

## 2019-05-28 RX ORDER — ONDANSETRON 4 MG/1
4 TABLET, ORALLY DISINTEGRATING ORAL EVERY 30 MIN PRN
Status: DISCONTINUED | OUTPATIENT
Start: 2019-05-28 | End: 2019-05-28 | Stop reason: HOSPADM

## 2019-05-28 RX ORDER — HYDROMORPHONE HYDROCHLORIDE 1 MG/ML
.3-.5 INJECTION, SOLUTION INTRAMUSCULAR; INTRAVENOUS; SUBCUTANEOUS EVERY 10 MIN PRN
Status: DISCONTINUED | OUTPATIENT
Start: 2019-05-28 | End: 2019-05-28 | Stop reason: HOSPADM

## 2019-05-28 RX ORDER — OXYCODONE AND ACETAMINOPHEN 5; 325 MG/1; MG/1
1-2 TABLET ORAL EVERY 4 HOURS PRN
Status: DISCONTINUED | OUTPATIENT
Start: 2019-05-28 | End: 2019-05-28 | Stop reason: HOSPADM

## 2019-05-28 RX ORDER — NALOXONE HYDROCHLORIDE 0.4 MG/ML
.1-.4 INJECTION, SOLUTION INTRAMUSCULAR; INTRAVENOUS; SUBCUTANEOUS
Status: DISCONTINUED | OUTPATIENT
Start: 2019-05-28 | End: 2019-05-28 | Stop reason: HOSPADM

## 2019-05-28 RX ORDER — SCOLOPAMINE TRANSDERMAL SYSTEM 1 MG/1
1 PATCH, EXTENDED RELEASE TRANSDERMAL
Status: DISCONTINUED | OUTPATIENT
Start: 2019-05-28 | End: 2019-05-28 | Stop reason: HOSPADM

## 2019-05-28 RX ADMIN — SODIUM CHLORIDE: 9 INJECTION, SOLUTION INTRAVENOUS at 08:21

## 2019-05-28 RX ADMIN — FENTANYL CITRATE 50 MCG: 50 INJECTION, SOLUTION INTRAMUSCULAR; INTRAVENOUS at 13:56

## 2019-05-28 RX ADMIN — PROPOFOL 180 MG: 10 INJECTION, EMULSION INTRAVENOUS at 13:38

## 2019-05-28 RX ADMIN — MIDAZOLAM 2 MG: 1 INJECTION INTRAMUSCULAR; INTRAVENOUS at 13:38

## 2019-05-28 RX ADMIN — PROPOFOL 200 MCG/KG/MIN: 10 INJECTION, EMULSION INTRAVENOUS at 13:38

## 2019-05-28 RX ADMIN — SCOPALAMINE 1 PATCH: 1 PATCH, EXTENDED RELEASE TRANSDERMAL at 12:34

## 2019-05-28 RX ADMIN — ONDANSETRON 4 MG: 2 INJECTION INTRAMUSCULAR; INTRAVENOUS at 13:38

## 2019-05-28 RX ADMIN — LIDOCAINE HYDROCHLORIDE 40 MG: 20 INJECTION, SOLUTION INFILTRATION; PERINEURAL at 13:38

## 2019-05-28 RX ADMIN — Medication 30 MG: at 13:38

## 2019-05-28 RX ADMIN — DEXAMETHASONE SODIUM PHOSPHATE 4 MG: 4 INJECTION, SOLUTION INTRA-ARTICULAR; INTRALESIONAL; INTRAMUSCULAR; INTRAVENOUS; SOFT TISSUE at 13:38

## 2019-05-28 RX ADMIN — CEFTRIAXONE SODIUM 1 G: 1 INJECTION, SOLUTION INTRAVENOUS at 13:34

## 2019-05-28 RX ADMIN — SODIUM CHLORIDE: 0.9 INJECTION, SOLUTION INTRAVENOUS at 13:44

## 2019-05-28 ASSESSMENT — LIFESTYLE VARIABLES: TOBACCO_USE: 1

## 2019-05-28 ASSESSMENT — MIFFLIN-ST. JEOR: SCORE: 1120.66

## 2019-05-28 NOTE — ANESTHESIA POSTPROCEDURE EVALUATION
Patient: Kait Augustin    Procedure(s):  Right Ureteroscopy, Fulgaration if Right iUpper Tract Urothelial Carcinoma    Diagnosis:right upper tract tumor  Diagnosis Additional Information: No value filed.    Anesthesia Type:  General, LMA    Note:  Anesthesia Post Evaluation    Patient location during evaluation: PACU  Patient participation: Able to fully participate in evaluation  Level of consciousness: awake and alert  Pain management: adequate  Airway patency: patent  Cardiovascular status: blood pressure returned to baseline, acceptable and hemodynamically stable  Respiratory status: acceptable  Hydration status: acceptable  PONV: none     Anesthetic complications: None          Last vitals:  Vitals:    05/28/19 0811   BP: 94/65   Resp: 16   Temp: 97.8  F (36.6  C)   SpO2: 96%         Electronically Signed By: AISSATOU Fernandez CRNA  May 28, 2019  2:19 PM

## 2019-05-28 NOTE — DISCHARGE INSTRUCTIONS
[facility]  Adult Discharge Orders & Instructions      For 24 hours after surgery:  1. Get plenty of rest.  A responsible adult must stay with you for at least 24 hours after you leave the hospital.   2. You may feel lightheaded.  IF so, sit for a few minutes before standing.  Have someone help you get up.   3. You may have a slight fever. Call the doctor if your fever is over 101 F (38.3 C) (taken under the tongue) or lasts longer than 24 hours.  4. You may have a dry mouth, a sore throat, muscle aches or trouble sleeping.  These should go away after 24 hours.  5. Do not make important or legal decisions.  6.   Do not drive or use heavy equipment.  If you have weakness or tingling, don't drive or use heavy equipment until this feeling goes away.                                                                 To contact a doctor, call    [phone]______________      Scopolamine Patch  A Scopalomine Patch was placed behind your  ear.  The patch is used to help with motion sickness or nausea after surgery.  It will last up to 3 days but can be removed sooner if you are experiencing an increase in drossiness, blurred vision, or feel it is no longer needed.  When removing make sure to flush down the toilet and wash hands immediately.  Keep out of reach of children and pets.

## 2019-05-28 NOTE — ANESTHESIA PREPROCEDURE EVALUATION
Anesthesia Pre-Procedure Evaluation    Patient: Kait Augustin   MRN: [id number] : 1960          Preoperative Diagnosis: bladder tumor    Procedure(s):  Transurethral Resection of Bladder Tumor  Bilateral Retrograde Pyelograms, possible Post op mitomycin C    Past Medical History:   Diagnosis Date     H/O renal calculi 2014     MIGRAINE HEADACHES      Nicotine dependence 2014     PONV (postoperative nausea and vomiting)      Tobacco abuse 2014     Past Surgical History:   Procedure Laterality Date     C LIGATE FALLOPIAN TUBE       CYSTOSCOPY, RETROGRADES, INSERT STENT URETER(S), COMBINED Right 2015    Procedure: COMBINED CYSTOSCOPY, RETROGRADES, INSERT STENT URETER(S);  Surgeon: Efren Couch MD;  Location: [facility]     CYSTOSCOPY, TRANSURETHRAL RESECTION (TUR) PROSTATE, COMBINED Bilateral 2019    Procedure: Transurethral Resection of Bladder Tumor, BILATERAL RETROGRADE PYELOGRAMS, RIGHT URETEROSCOPY WITH HOLMIUM LASER TUMOR ABLATION AND STENT PLACEMENT;  Surgeon: Delfino Walker MD;  Location:  OR     CYSTOSCOPY, TRANSURETHRAL RESECTION (TUR) TUMOR BLADDER, COMBINED N/A 2/10/2015    Procedure: COMBINED CYSTOSCOPY, TRANSURETHRAL RESECTION (TUR) TUMOR BLADDER;  Surgeon: Efren Couch MD;  Location: [facility]     CYSTOSCOPY, TRANSURETHRAL RESECTION (TUR) TUMOR BLADDER, COMBINED N/A 2015    Procedure: COMBINED CYSTOSCOPY, TRANSURETHRAL RESECTION (TUR) TUMOR BLADDER;  Surgeon: Efren Couch MD;  Location: [facility]       Anesthesia Evaluation     . Pt has had prior anesthetic.     History of anesthetic complications   - PONV        ROS/MED HX    ENT/Pulmonary:     (+)tobacco use, Current use 2 cigarettes per day packs/day  , . .    Neurologic:  - neg neurologic ROS     Cardiovascular:  - neg cardiovascular ROS       METS/Exercise Tolerance:  >4 METS   Hematologic:  - neg hematologic  ROS       Musculoskeletal:  - neg musculoskeletal ROS      "  GI/Hepatic:  - neg GI/hepatic ROS       Renal/Genitourinary:  - ROS Renal section negative       Endo:  - neg endo ROS       Psychiatric:  - neg psychiatric ROS       Infectious Disease:  - neg infectious disease ROS       Malignancy:      - no malignancy   Other:    - neg other ROS                        Physical Exam  Normal systems: cardiovascular, pulmonary and dental    Airway   Mallampati: II  TM distance: >3 FB  Neck ROM: full    Dental     Cardiovascular   Rhythm and rate: regular and normal      Pulmonary    breath sounds clear to auscultation            Lab Results   Component Value Date    WBC 8.6 05/22/2019    HGB 13.5 05/22/2019    HCT 39.8 05/22/2019     05/22/2019     05/22/2019    POTASSIUM 4.1 05/22/2019    CHLORIDE 111 (H) 05/22/2019    CO2 23 05/22/2019    BUN 20 05/22/2019    CR 0.74 05/22/2019    GLC 81 05/22/2019    LONG 9.0 05/22/2019    PHOS 3.7 05/22/2019    ALBUMIN 4.1 05/22/2019    PROTTOTAL 7.3 05/01/2019    ALT 27 05/01/2019    AST 11 05/01/2019    ALKPHOS 106 05/01/2019    BILITOTAL 0.5 05/01/2019    INR 1.0 01/23/2015    TSH 1.00 11/17/2014       Preop Vitals  BP Readings from Last 3 Encounters:   05/28/19 94/65   05/22/19 122/74   05/13/19 120/80    Pulse Readings from Last 3 Encounters:   05/22/19 72   05/13/19 68   05/07/19 60      Resp Readings from Last 3 Encounters:   05/28/19 16   05/22/19 14   05/13/19 16    SpO2 Readings from Last 3 Encounters:   05/28/19 96%   05/22/19 96%   05/07/19 96%      Temp Readings from Last 1 Encounters:   05/28/19 97.8  F (36.6  C) (Tympanic)    Ht Readings from Last 1 Encounters:   05/28/19 1.549 m (5' 1\")      Wt Readings from Last 1 Encounters:   05/28/19 60.3 kg (133 lb)    Estimated body mass index is 25.13 kg/m  as calculated from the following:    Height as of an earlier encounter on 5/28/19: 1.549 m (5' 1\").    Weight as of an earlier encounter on 5/28/19: 60.3 kg (133 lb).       Anesthesia Plan      History & Physical " Review      ASA Status:  2 .    NPO Status:  > 6 hours    Plan for General and LMA with Propofol induction. Maintenance will be TIVA.    PONV prophylaxis:  Ondansetron (or other 5HT-3), Dexamethasone or Solumedrol and Scopolamine patch       Postoperative Care      Consents                   AISSATOU Fernandez CRNA

## 2019-05-28 NOTE — OR NURSING
Two stents removed and tagged as explanted. May have been explantrf earlier or at other facility but should not still be listed as implants.22 percuflex actually explantedand documented as such

## 2019-05-28 NOTE — OP NOTE
Preoperative diagnosis  Right upper tract urothelial carcinoma    Postoperative diagnosis  Right upper tract urothelial carcinoma    Procedure performed  Cystoscopy with bilateral  retrograde pyelogram  Interpretation of retrograde, right  Right fulguration of right renal pelvis tumor  Right ureteral stent exchange.    Surgeon(s)/Proceduralist(s) and Assistants (if any)  Surgeon(s):  Delfino Walker MD  Circulator: Dulce Maria Agarwal RN  Scrub Person: Mae Valdez RN  2nd Scrub: Tatyana Worley RN; Audra Meredith  Pre-Op Nurse: Diana Tyler RN    Specimen(s)  No    (EBL) Estimated blood loss (ml)  5    Anesthesia  General    Complications  None    Findings  Right renal pelvis papillary tumor present with minimal three dimensional structure- improved significantly since the previous surgery.      Right retrograde pyelogram revealed an abnormal appearing upper tract, ureter was normal caliber.  The abnormal shape of the right renal pelvis prompted diagnostic right ureteroscopy.    Right ureteroscopy revealed the upper tract tumor.    Indications  58 year old female agreed to undergo the above named procedure after discussion of the alternatives, risks and benefits.    The patient was found to have a bladder tumor on cystoscopy for work up of gross hematuria.  She has a remote history of bladder cancer s/p TURBT x 2 involving the right ureteral orifice.  Informed consent was obtained.      Procedure  The patient was taken to the operating room and placed supine on the operating table.  Pre-operative antibiotics were administered and bilateral lower extremity SCDs were placed.    After induction of general anesthesia the patient was positioned in dorsal lithotomy.    A time-out was performed and the patient was prepped and draped in a sterile fashion.      A Sensor wire was passed to the kidney alongside the previously placed stent.  The stent was removed.  A 5 Japanese was passed over the wire with retrograde  performed.  An 8-10 was passed over the wire and Superstiff placed in the kidney.  An 12 x 14 - 36 access sheath was passed over the Superstiff wire.  The kidney was entered and the residual tumor ablated cautery/fulguration at setting 15.  Tumor was lower pole location.  Access sheath was removed.  A 6 x 22 stent was placed retrograde with use of fluoroscopy.    The patient tolerated the procedure well, was awakened and transferred to the recovery room in stable condition.    Plan  Follow up in clinic for stent pull in 1 week or so.

## 2019-05-28 NOTE — ANESTHESIA CARE TRANSFER NOTE
Patient: Kait Augustin    Procedure(s):  Right Ureteroscopy, Fulgaration if Right iUpper Tract Urothelial Carcinoma    Diagnosis: right upper tract tumor  Diagnosis Additional Information: No value filed.    Anesthesia Type:   General, LMA     Note:  Airway :Face Mask  Patient transferred to:PACU  Handoff Report: Identifed the Patient, Identified the Reponsible Provider, Reviewed the pertinent medical history, Discussed the surgical course, Reviewed Intra-OP anesthesia mangement and issues during anesthesia, Set expectations for post-procedure period and Allowed opportunity for questions and acknowledgement of understanding      Vitals: (Last set prior to Anesthesia Care Transfer)    CRNA VITALS  5/28/2019 1343 - 5/28/2019 1419      5/28/2019             Pulse:  86    Ht Rate:  86    SpO2:  99 %    Resp Rate (observed):  9                Electronically Signed By: AISSATOU Fernandez CRNA  May 28, 2019  2:19 PM

## 2019-05-28 NOTE — OR NURSING
"PACU Transfer Note    Kait Augustin was transferred to DSU via cart.  Equipment used for transport:  none.  Accompanied by:  Asha ADLER RN  Prescriptions were: none    PACU Respiratory Event Documentation     1) Episodes of Apnea greater than or equal to 10 seconds: 0     2) Bradypnea - less than 8 breaths per minute: 0    3) Pain score on 0 to 10 scale: 0 - states \"I feel like I have to pee\"    4) Pain-sedation mismatch (yes or no): no    5) Repeated 02 desaturation less than 90% (yes or no): no    Anesthesia notified? (yes or no): na    Any of the above events occuring repeatedly in separate 30 minute intervals may be considered recurrent PACU respiratory events.    Patient stable and meets phase 1 discharge criteria for transport from PACU.    "

## 2019-06-03 ENCOUNTER — OFFICE VISIT (OUTPATIENT)
Dept: UROLOGY | Facility: OTHER | Age: 59
End: 2019-06-03
Attending: UROLOGY
Payer: COMMERCIAL

## 2019-06-03 VITALS — RESPIRATION RATE: 12 BRPM | BODY MASS INDEX: 25.02 KG/M2 | WEIGHT: 132.4 LBS | HEART RATE: 72 BPM

## 2019-06-03 DIAGNOSIS — C64.1 UROTHELIAL CARCINOMA OF KIDNEY, RIGHT (H): ICD-10-CM

## 2019-06-03 DIAGNOSIS — Z85.51 HISTORY OF BLADDER CANCER: ICD-10-CM

## 2019-06-03 DIAGNOSIS — Z85.51 HISTORY OF BLADDER CANCER: Primary | ICD-10-CM

## 2019-06-03 PROCEDURE — 99214 OFFICE O/P EST MOD 30 MIN: CPT | Mod: 25 | Performed by: UROLOGY

## 2019-06-03 PROCEDURE — 52310 CYSTOSCOPY AND TREATMENT: CPT | Performed by: UROLOGY

## 2019-06-03 ASSESSMENT — PAIN SCALES - GENERAL: PAINLEVEL: NO PAIN (0)

## 2019-06-03 NOTE — NURSING NOTE
Cefuroxime 500mg tablet (2-250mg tablets with same lot # and expiration date) by mouth one time now ordered by Delfino Walker MD.  Medication administered per verbal order   Lot # [id number]  Exp. 2/28/2020  Patient tolerated well.  Mariza Rubin..................6/3/2019  9:08 AM

## 2019-06-03 NOTE — PATIENT INSTRUCTIONS

## 2019-06-03 NOTE — PROGRESS NOTES
Type of Visit  Established    Chief Complaint  Bladder cancer  Right upper tract urothelial carcinoma    HPI  Ms. Augustin is a 58 year old female who recently underwent resection for a recurrent bladder tumor as well as staged right upper tract ureteroscopy with ablation of urothelial carcinoma.  She follows up for stent pull as well as discussion of the next step in her treatment plan.  She denies any new symptoms such as fevers and chills.  Discomfort from this most recent procedure was a little more tolerable than the prior surgery.        Review of Systems  I reviewed the ROS with the patient.    Nursing Notes:   Mariza Rubin RN  6/3/2019  9:09 AM  Signed  Cefuroxime 500mg tablet (2-250mg tablets with same lot # and expiration date) by mouth one time now ordered by Delfino Walker MD.  Medication administered per verbal order   Lot # [id number]  Exp. 2/28/2020  Patient tolerated well.  Mariza Rubin..................6/3/2019  9:08 AM    Weight loss:    No     Recent fever/chills:  No   Night sweats:   No  Current skin rash:  No   Recent hair loss:  No  Heat intolerance:  No   Cold intolerance:  No  Chest pain:   No   Palpitations:   No  Shortness of breath:  No   Wheezing:   No  Constipation:    No   Diarrhea:   No   Nausea:   No   Vomiting:   No   Kidney/side pain:  No   Back pain:   No  Frequent headaches:  No   Dizziness:     No  Leg swelling:   No   Calf pain:    No    Family History  Family History   Problem Relation Age of Onset     C.A.D. Father         dx late 60's, CABG, angioplasty     Cancer Father         Lung, dx age 80 h/o tobacco use     Heart Disease Mother      Thyroid Disease Mother      Lipids Sister        Physical Exam  Pulse 72   Resp 12   Wt 60.1 kg (132 lb 6.4 oz)   LMP 06/18/2005   BMI 25.02 kg/m    Constitutional: NAD, WDWN.  Cardiovascular: Regular rate.  Pulmonary/Chest: Respirations are even and non-labored bilaterally.  Abdominal: Soft. No distension, tenderness,  masses or guarding. No CVA tenderness.  Extremities: THOR x 4, Warm. No clubbing.  No cyanosis.    Skin: Pink, warm and dry.  No rashes noted.    ^^^^^^^^^^^^^^^^^^^^^^^^^^^^^^^^^^^^^^^^^^    Preoperative diagnosis  History of bladder cancer    Postoperative diagnosis  History of bladder cancer    Procedure  Flexible cystourethroscopy with stent removal    Surgeon  Delfino aWlker MD    Anesthesia  2% lidocaine jelly intraurethrally    Complications  None    Indications  58 year old female who is status post ureteroscopy with ablation of right upper tract urothelial carcinoma    Procedure  The patient was given one dose of antibiotics. The patient was placed in supine position and was prepped and draped in sterile fashion.  2% lidocaine jelly was bluntly injected per urethra without difficulty. I passed the 14 Somali flexible cystoscope through the urethra and into the bladder.  With the aid of a stent grasper I grasped and removed the stent in its entirety.  The patient tolerated the procedure well.    ^^^^^^^^^^^^^^^^^^^^^^^^^^^^^^^^^^^^^^^^^^    Labs  Results for LAZARO KEYS (MRN [id number]) as of 5/13/2019 13:23   5/1/2019 11:16 5/1/2019 11:21   Sodium 141    Potassium 4.2    Chloride 110 (H)    Carbon Dioxide 26    Urea Nitrogen 13    Creatinine 0.70    GFR Estimate >90    GFR Estimate If Black >90    Calcium 8.9    Anion Gap 5    Albumin 4.1    Protein Total 7.3    Bilirubin Total 0.5    Alkaline Phosphatase 106    ALT 27    AST 11    Glucose 84    WBC 7.3    Hemoglobin 14.7    Hematocrit 43.2    Platelet Count 224    RBC Count 4.63    MCV 93    MCH 31.7    MCHC 34.0    RDW 13.1    Diff Method Automated Method    % Neutrophils 55.0    % Lymphocytes 33.2    % Monocytes 9.0    % Eosinophils 2.1    % Basophils 0.7    Absolute Neutrophil 4.0    Absolute Lymphocytes 2.4    Absolute Monocytes 0.7    Absolute Eosinophils 0.2    Absolute Basophils 0.1    Color Urine  Yellow   Appearance Urine  Clear   Glucose Urine   Negative   Bilirubin Urine  Negative   Ketones Urine  Negative   Specific Gravity Urine  1.015   pH Urine  6.5   Protein Albumin Urine  Negative   Urobilinogen Urine  0.2   Nitrite Urine  Negative   Blood Urine  Negative   Leukocyte Esterase Urine  Negative   Source  Midstream Urine       Pathology  I personally reviewed the pathology report  5/7/2019  High grade Ta    5/7/2019  Right upper tract with low grade Ta urothelial tumor which was ablated (no biopsy sent)    She underwent TURBT 2/2015 and 12/2015.  She did receive postoperative mitomycin-C in December 2015.  Right ureteral orifice was involved.  She denies flank pain bilaterally.  Pathology was LGTa.  She denies gross hematuria.  Her last surveillance cystoscopy was 1 year ago.    Assessment  Ms. Augustin is a 58 year old female who is status post TURBT for recurrent high risk bladder cancer as well as staged right upper tract ureteroscopy with ablation of urothelial carcinoma.  Her stent was pulled today without issue.    TURBT   5/2019  Induction BCG X 6 weeks 6/2019  anticipated  Maintenance A   9/2019  anticipated (3 months later)  Maintenance B   12/2019  anticipated (3 months later)  Maintenance C  6/2020  anticipated (6 months later)    I discussed the proposed mechanism of action of BCG. I discussed the manner in which it is administered and the potential morbidity including urinary symptoms such as frequency, urgency, dysuria, gross hematuria. In addition less common symptoms of fever, joint pains, flu-like symptoms , fatigue and the rare event of BCG-sepsis. I discussed reasons that would lead to a delay in treatment or dose reduction.    Plan  Begin weekly BCG intravesical induction therapy x 6 weeks.  Follow-up for surveillance cystoscopy in 3 months (September) in [place]  Follow-up for surveillance cystoscopy and surveillance right ureteroscopy in 6 months in the OR with MAC sedation          I spent 25 minutes on this patient's visit and  over 50% of this time was spent in face-to-face discussion and counseling regarding the diagnosis of bladder cancer, risks of recurrence and progression, rationale for surveillance with cystoscopy, indications for intravesical therapy, risks and benefits of each option.

## 2019-06-12 ENCOUNTER — TELEPHONE (OUTPATIENT)
Dept: UROLOGY | Facility: OTHER | Age: 59
End: 2019-06-12

## 2019-06-12 NOTE — TELEPHONE ENCOUNTER
In house pharmacy stopped by - they do not have enough doses for patient, there is a shortage.  Is there another option for patient?

## 2019-06-19 NOTE — TELEPHONE ENCOUNTER
Per Delfino Walker MD patient should do BCG instead of gemcitabine as [place] has received more doses of BCG.  Mariza Rubin RN......June 19, 2019...1:57 PM

## 2019-06-24 ENCOUNTER — APPOINTMENT (OUTPATIENT)
Dept: LAB | Facility: OTHER | Age: 59
End: 2019-06-24
Attending: NURSE PRACTITIONER
Payer: COMMERCIAL

## 2019-06-24 ENCOUNTER — HOSPITAL ENCOUNTER (OUTPATIENT)
Dept: INFUSION THERAPY | Facility: OTHER | Age: 59
Discharge: HOME | End: 2019-06-24
Attending: UROLOGY | Admitting: UROLOGY
Payer: COMMERCIAL

## 2019-06-24 ENCOUNTER — OFFICE VISIT (OUTPATIENT)
Dept: UROLOGY | Facility: OTHER | Age: 59
End: 2019-06-24
Attending: UROLOGY
Payer: COMMERCIAL

## 2019-06-24 VITALS
SYSTOLIC BLOOD PRESSURE: 121 MMHG | RESPIRATION RATE: 18 BRPM | TEMPERATURE: 97.6 F | BODY MASS INDEX: 25.39 KG/M2 | DIASTOLIC BLOOD PRESSURE: 46 MMHG | HEART RATE: 67 BPM | WEIGHT: 134.4 LBS

## 2019-06-24 DIAGNOSIS — Z85.51 HISTORY OF BLADDER CANCER: Primary | ICD-10-CM

## 2019-06-24 DIAGNOSIS — C64.1 UROTHELIAL CARCINOMA OF KIDNEY, RIGHT (H): ICD-10-CM

## 2019-06-24 LAB
ALBUMIN UR-MCNC: NEGATIVE MG/DL
APPEARANCE UR: CLEAR
BILIRUB UR QL STRIP: NEGATIVE
COLOR UR AUTO: YELLOW
GLUCOSE UR STRIP-MCNC: NEGATIVE MG/DL
HGB UR QL STRIP: NEGATIVE
KETONES UR STRIP-MCNC: NEGATIVE MG/DL
LEUKOCYTE ESTERASE UR QL STRIP: NEGATIVE
NITRATE UR QL: NEGATIVE
PH UR STRIP: 6 PH (ref 5–9)
SOURCE: NORMAL
SP GR UR STRIP: 1.02 (ref 1–1.03)
UROBILINOGEN UR STRIP-ACNC: 0.2 EU/DL (ref 0.2–1)

## 2019-06-24 PROCEDURE — 81003 URINALYSIS AUTO W/O SCOPE: CPT | Mod: ZL | Performed by: UROLOGY

## 2019-06-24 PROCEDURE — 99207 ZZC NO CHARGE LOS: CPT | Performed by: UROLOGY

## 2019-06-24 PROCEDURE — 51720 TREATMENT OF BLADDER LESION: CPT

## 2019-06-24 PROCEDURE — 25000128 H RX IP 250 OP 636: Performed by: UROLOGY

## 2019-06-24 RX ADMIN — BACILLUS CALMETTE-GUERIN 50 MG: 50 POWDER, FOR SUSPENSION INTRAVESICAL at 09:07

## 2019-06-24 NOTE — PROGRESS NOTES
The patient presents today for urinalysis collection prior to intravesical induction BCG #1 of 6.  The patient denies gross hematuria.  The patient also denies new acute irritative urinary symptoms such as dysuria and urgency.    Results for orders placed or performed in visit on 06/24/19   UA reflex to Microscopic   Result Value Ref Range    Color Urine Yellow     Appearance Urine Clear     Glucose Urine Negative NEG^Negative mg/dL    Bilirubin Urine Negative NEG^Negative    Ketones Urine Negative NEG^Negative mg/dL    Specific Gravity Urine 1.020 1.000 - 1.030    Blood Urine Negative NEG^Negative    pH Urine 6.0 5.0 - 9.0 pH    Protein Albumin Urine Negative NEG^Negative mg/dL    Urobilinogen Urine 0.2 0.2 - 1.0 EU/dL    Nitrite Urine Negative NEG^Negative    Leukocyte Esterase Urine Negative NEG^Negative    Source Midstream Urine          Plan  Patient will undergo BCG installation today as planned  Follow-up next week for repeat treatment

## 2019-06-24 NOTE — PROGRESS NOTES
BCG  Patient presents to the clinic for #1 of 6 BCG bladder treatment for cancer, from urologist clinic.  UA Lab was obtained at urology per order completed for UA with micro. Lab results were within parameters per Dr. Martinez order. Patient questioned treatment in [place] and traveling and missed work.  Discussed providers availability and being in the building Monday and thursdays.  Offered different times and days to assist with schedule. Reports no fever, cough or chills. No visible blood noted in the urine in cath tubing. Patient prepped and draped in sterile manner.  #16 Estonian - 5 ml silicone bateman inserted without difficulty, bladder emptied clear, yellow urine. Patient tolerated without difficulty verbalizing this was first catheter placement when awake.  At 0912AM 50 mg of Sharon BCG solution inserted via the bateman catheter into the bladder.  Chemo precautions used.  Catheter clamped.  At 1113AM catheter unclamped and drained of all BCG solution here at clinic.  Catheter removed.  Patient tolerated treatment well. Discharge instructions reviewed with patient. Patient will return on 7/2/2019 for #2/6 BCG treatment. Supervised by .  Patient tolerated treatment without concern, able to keep BCG installed for 2 hours, patient understood education on precautions and verbalized that she has the instruction sheet given by Juan Dawkins nurse.  Appointment card given with number for direct number and contact information. Becca Tucker RN on 6/24/2019 at 11:18 AM

## 2019-07-01 ENCOUNTER — HOSPITAL ENCOUNTER (OUTPATIENT)
Dept: INFUSION THERAPY | Facility: OTHER | Age: 59
Discharge: HOME | End: 2019-07-01
Attending: UROLOGY | Admitting: UROLOGY
Payer: COMMERCIAL

## 2019-07-01 ENCOUNTER — OFFICE VISIT (OUTPATIENT)
Dept: UROLOGY | Facility: OTHER | Age: 59
End: 2019-07-01
Attending: UROLOGY
Payer: COMMERCIAL

## 2019-07-01 VITALS
RESPIRATION RATE: 16 BRPM | HEART RATE: 69 BPM | DIASTOLIC BLOOD PRESSURE: 70 MMHG | SYSTOLIC BLOOD PRESSURE: 121 MMHG | BODY MASS INDEX: 25.13 KG/M2 | TEMPERATURE: 96.9 F | WEIGHT: 133 LBS

## 2019-07-01 VITALS — HEART RATE: 68 BPM | BODY MASS INDEX: 25.28 KG/M2 | RESPIRATION RATE: 12 BRPM | WEIGHT: 133.8 LBS

## 2019-07-01 DIAGNOSIS — C67.8 MALIGNANT NEOPLASM OF OVERLAPPING SITES OF BLADDER (H): Primary | ICD-10-CM

## 2019-07-01 DIAGNOSIS — C64.1 UROTHELIAL CARCINOMA OF KIDNEY, RIGHT (H): ICD-10-CM

## 2019-07-01 DIAGNOSIS — Z85.51 HISTORY OF BLADDER CANCER: Primary | ICD-10-CM

## 2019-07-01 PROCEDURE — 99207 ZZC NO CHARGE LOS: CPT | Performed by: UROLOGY

## 2019-07-01 PROCEDURE — 25000128 H RX IP 250 OP 636: Performed by: UROLOGY

## 2019-07-01 PROCEDURE — 51720 TREATMENT OF BLADDER LESION: CPT

## 2019-07-01 PROCEDURE — 81003 URINALYSIS AUTO W/O SCOPE: CPT | Mod: ZL | Performed by: UROLOGY

## 2019-07-01 RX ADMIN — BACILLUS CALMETTE-GUERIN 50 MG: 50 POWDER, FOR SUSPENSION INTRAVESICAL at 09:02

## 2019-07-01 ASSESSMENT — PAIN SCALES - GENERAL: PAINLEVEL: NO PAIN (0)

## 2019-07-01 NOTE — PROGRESS NOTES
Patient phoned and alerted staff that she had voided at 1115 without difficulty/concern.  Patient will return next week to see Urologist and urine specimen to authorize treatment.  If treatment authorize, patient will check in at infusion for BCG # 3. Becca Tucker RN on 7/1/2019 at 1:14 PM

## 2019-07-01 NOTE — PROGRESS NOTES
The patient presents today for urinalysis collection prior to intravesical induction BCG #2 of 6.  The patient denies gross hematuria.  The patient also denies new acute irritative urinary symptoms such as dysuria and urgency.    Results for orders placed or performed in visit on 07/01/19   *UA reflex to Microscopic   Result Value Ref Range    Color Urine Yellow     Appearance Urine Clear     Glucose Urine Negative NEG^Negative mg/dL    Bilirubin Urine Negative NEG^Negative    Ketones Urine Negative NEG^Negative mg/dL    Specific Gravity Urine 1.025 1.000 - 1.030    Blood Urine Negative NEG^Negative    pH Urine 6.0 5.0 - 9.0 pH    Protein Albumin Urine Negative NEG^Negative mg/dL    Urobilinogen Urine 0.2 0.2 - 1.0 EU/dL    Nitrite Urine Negative NEG^Negative    Leukocyte Esterase Urine Negative NEG^Negative    Source Midstream Urine        Plan  Patient will undergo BCG installation today as planned  Follow-up next week for repeat treatment

## 2019-07-01 NOTE — PROGRESS NOTES
.BCG  Patient presents to the clinic for #2 of 6 BCG bladder treatment for cancer. Lab order completed for UA with micro. Lab results were within parameters ordered by Dr. Walker. No fever, cough or chills. No visible blood noted in the urine. Uro jet used for local anesthesia. Patient prepped and draped in sterile manner. #16 Arabic - 5 ml silicone bateman inserted without difficulty, bladder emptied clear yellow urine. At 0910 AM 50 mg of Brenda BCG solution inserted via the bateman catheter into the bladder. Chemo precautions used.  Catheter removed. Patient tolerated treatment well. Patient reminded to retain BCG solution in the bladder for two hours and call office after voiding at home. Discharge instructions reviewed with patient. Patient will return on 8 for #3 BCG treatment. Supervised by Dr. Walker.  Becca Tucker RN on 7/1/2019 at 9:17 AM

## 2019-07-08 ENCOUNTER — OFFICE VISIT (OUTPATIENT)
Dept: UROLOGY | Facility: OTHER | Age: 59
End: 2019-07-08
Attending: UROLOGY
Payer: COMMERCIAL

## 2019-07-08 ENCOUNTER — HOSPITAL ENCOUNTER (OUTPATIENT)
Dept: INFUSION THERAPY | Facility: OTHER | Age: 59
Discharge: HOME | End: 2019-07-08
Attending: UROLOGY | Admitting: UROLOGY
Payer: COMMERCIAL

## 2019-07-08 VITALS
HEART RATE: 70 BPM | RESPIRATION RATE: 16 BRPM | SYSTOLIC BLOOD PRESSURE: 140 MMHG | DIASTOLIC BLOOD PRESSURE: 57 MMHG | TEMPERATURE: 96.8 F

## 2019-07-08 DIAGNOSIS — C64.1 UROTHELIAL CARCINOMA OF KIDNEY, RIGHT (H): ICD-10-CM

## 2019-07-08 DIAGNOSIS — Z85.51 HISTORY OF BLADDER CANCER: Primary | ICD-10-CM

## 2019-07-08 PROCEDURE — 81003 URINALYSIS AUTO W/O SCOPE: CPT | Mod: ZL | Performed by: UROLOGY

## 2019-07-08 PROCEDURE — 51720 TREATMENT OF BLADDER LESION: CPT

## 2019-07-08 PROCEDURE — 25000128 H RX IP 250 OP 636: Performed by: UROLOGY

## 2019-07-08 PROCEDURE — 99207 ZZC NO CHARGE LOS: CPT | Performed by: UROLOGY

## 2019-07-08 RX ADMIN — BACILLUS CALMETTE-GUERIN 50 MG: 50 POWDER, FOR SUSPENSION INTRAVESICAL at 08:40

## 2019-07-08 NOTE — PROGRESS NOTES
Patient called to say she  voided with no difficulties.  Adri advised.  Rafaela Aguayo RN on 7/8/2019 at 10:58 AM

## 2019-07-08 NOTE — PROGRESS NOTES
The patient presents today for urinalysis collection prior to intravesical induction BCG #3 of 6.  The patient denies gross hematuria.  The patient also denies new acute irritative urinary symptoms such as dysuria and urgency.    Results for orders placed or performed in visit on 07/08/19   UA reflex to Microscopic   Result Value Ref Range    Color Urine Yellow     Appearance Urine Clear     Glucose Urine Negative NEG^Negative mg/dL    Bilirubin Urine Negative NEG^Negative    Ketones Urine Negative NEG^Negative mg/dL    Specific Gravity Urine 1.025 1.000 - 1.030    Blood Urine Negative NEG^Negative    pH Urine 6.0 5.0 - 9.0 pH    Protein Albumin Urine Negative NEG^Negative mg/dL    Urobilinogen Urine 0.2 0.2 - 1.0 EU/dL    Nitrite Urine Negative NEG^Negative    Leukocyte Esterase Urine Negative NEG^Negative    Source Midstream Urine        Plan  Patient will undergo BCG installation today as planned  Follow-up next week for repeat treatment

## 2019-07-08 NOTE — PROGRESS NOTES
BCG  Patient presents to the clinic for #3 of 6 BCG bladder treatment for cancer. Lab order completed for UA with micro. Lab results were within parameters ordered by Dr. Walker. No fever, cough or chills. Patient seen by Dr. Walker prior to infusion appt and approved for treatment today. Patient prepped and draped in sterile manner. #16 Thai - 5 ml  bateman inserted without difficulty, bladder emptied clear yellow urine. At 0840 AM 50 mg of Brenda BCG solution inserted via the bateman catheter into the bladder. Chemo precautions used.  Catheter removed. Patient tolerated treatment well. Patient reminded to retain BCG solution in the bladder for two hours and call office after voiding at home. Discharge instructions reviewed with patient. Patient will return on 7/15/19 for #4 BCG treatment. Supervised by Dr. Walker.

## 2019-07-08 NOTE — PATIENT INSTRUCTIONS
Before treatment    Do not drink liquids for 4 hours before you receive your treatment    Please eat normally before your appointment    A urine sample must be tested for possible bladder  infection just prior to each treatment. If the lab shows an infection, it must be treated before the next BCG treatment can be done.    If you see blood in your urine, the procedure will be cancelled    During the treatment:    The medication will be instilled into your bladder through a catheter    The catheter will be removed and you can go home if you do not have leaking or retention of urine. If you are staying the catheter will be left in for up to 2 hours. After the 2 hours your bladder will be drained via the catheter and then the catheter will be removed.    After Treatment    If you go home with the medication in your bladder, leave medication in for up to 2 hours. After the 2 hours sit down on the toilet to urinate and fully empty your bladder. Pour 2 cups of bleach in the toilet and let sit for 15 minutes with the lid shut. Flush the toilet twice and wash your hands and genitals well with soap and warm water. If any clothes, bedding or other items have touched your urine it should be washed in hot soapy water. All this needs to be done for at least 8 hours after your treatment.    Drink lots of water for at least 6 hours after you first empty your bladder    Empty your bladder often. This helps prevent problems    BCG can irritate your skin    Wash any skin that comes in contact with your urine    Change any clothing that is contaminated by urine.    Side effects      BCG can irritate the bladder    You may pass urine more often    You may have pain when you pass uine or need to pass urine at night    If these symptoms are severe, your next treatment may be delayed  or decreased    Voiding symptoms usually improve over the next 2-3 days    Important Information    Attempt to leave in your bladder up to a maximum of 2  hours    Please call the clinic at [phone] as soon as you urinate    If you are unable to urinate after 2 hours please return to the clinic as soon as possible for catheter drainage of the medication as soon as possible    Tell your dentist if you are being treated with BCG before you receive treatment with them    You may drink small amounts of alcohol while being treated with BCG  BCG may damage sperm and may harm an infant   * use birth control while being treated   * tell your doctor if you or your partner becomes pregnant   * do not breastfeed during treatment      When to call your doctor  Dr. Walker: [phone]    Rash that continues to worsen over time    Fever greater then 101    Severe pain when you pass urine that does not improve    Pain in your abdomen  If you are unable to contact Dr. Walker's office please contact your primary care provider.    After normal business hours or on the weekend please contact the emergency room  at [phone]

## 2019-07-15 ENCOUNTER — OFFICE VISIT (OUTPATIENT)
Dept: UROLOGY | Facility: OTHER | Age: 59
End: 2019-07-15
Attending: UROLOGY
Payer: COMMERCIAL

## 2019-07-15 ENCOUNTER — HOSPITAL ENCOUNTER (OUTPATIENT)
Dept: INFUSION THERAPY | Facility: OTHER | Age: 59
Discharge: HOME | End: 2019-07-15
Attending: UROLOGY | Admitting: UROLOGY
Payer: COMMERCIAL

## 2019-07-15 VITALS
SYSTOLIC BLOOD PRESSURE: 119 MMHG | DIASTOLIC BLOOD PRESSURE: 63 MMHG | HEART RATE: 75 BPM | TEMPERATURE: 97 F | RESPIRATION RATE: 16 BRPM

## 2019-07-15 DIAGNOSIS — Z85.51 HISTORY OF BLADDER CANCER: Primary | ICD-10-CM

## 2019-07-15 DIAGNOSIS — C64.1 UROTHELIAL CARCINOMA OF KIDNEY, RIGHT (H): ICD-10-CM

## 2019-07-15 LAB
ALBUMIN UR-MCNC: NEGATIVE MG/DL
APPEARANCE UR: CLEAR
BILIRUB UR QL STRIP: NEGATIVE
COLOR UR AUTO: YELLOW
GLUCOSE UR STRIP-MCNC: NEGATIVE MG/DL
HGB UR QL STRIP: NEGATIVE
KETONES UR STRIP-MCNC: NEGATIVE MG/DL
LEUKOCYTE ESTERASE UR QL STRIP: NEGATIVE
NITRATE UR QL: NEGATIVE
PH UR STRIP: 5 PH (ref 5–9)
SOURCE: NORMAL
SP GR UR STRIP: 1.02 (ref 1–1.03)
UROBILINOGEN UR STRIP-ACNC: 0.2 EU/DL (ref 0.2–1)

## 2019-07-15 PROCEDURE — 81003 URINALYSIS AUTO W/O SCOPE: CPT | Mod: ZL | Performed by: UROLOGY

## 2019-07-15 PROCEDURE — 51720 TREATMENT OF BLADDER LESION: CPT

## 2019-07-15 PROCEDURE — 99207 ZZC NO CHARGE LOS: CPT | Performed by: UROLOGY

## 2019-07-15 PROCEDURE — 25000128 H RX IP 250 OP 636: Performed by: UROLOGY

## 2019-07-15 RX ADMIN — BACILLUS CALMETTE-GUERIN 50 MG: 50 POWDER, FOR SUSPENSION INTRAVESICAL at 08:44

## 2019-07-15 NOTE — PROGRESS NOTES
The patient presents today for urinalysis collection prior to intravesical induction BCG #4 of 6.  The patient denies gross hematuria.  The patient also denies new acute irritative urinary symptoms such as dysuria and urgency.    Results for orders placed or performed in visit on 07/15/19   UA reflex to Microscopic   Result Value Ref Range    Color Urine Yellow     Appearance Urine Clear     Glucose Urine Negative NEG^Negative mg/dL    Bilirubin Urine Negative NEG^Negative    Ketones Urine Negative NEG^Negative mg/dL    Specific Gravity Urine 1.025 1.000 - 1.030    Blood Urine Negative NEG^Negative    pH Urine 5.0 5.0 - 9.0 pH    Protein Albumin Urine Negative NEG^Negative mg/dL    Urobilinogen Urine 0.2 0.2 - 1.0 EU/dL    Nitrite Urine Negative NEG^Negative    Leukocyte Esterase Urine Negative NEG^Negative    Source Midstream Urine        Plan  Patient will undergo BCG installation today as planned  Follow-up next week for repeat treatment

## 2019-07-15 NOTE — PROGRESS NOTES
Patient called reporting no difficulty with voiding and she was able to empty bladder. She returns next week for next dose.  Jean S. Hammann, RN on 7/15/2019 at 11:15 AM

## 2019-07-15 NOTE — PATIENT INSTRUCTIONS
Before treatment    Do not drink liquids for 4 hours before you receive your treatment    Please eat normally before your appointment    A urine sample must be tested for possible bladder  infection just prior to each treatment. If the lab shows an infection, it must be treated before the next BCG treatment can be done.    If you see blood in your urine, the procedure will be cancelled    During the treatment:    The medication will be instilled into your bladder through a catheter    The catheter will be removed and you can go home if you do not have leaking or retention of urine. If you are staying the catheter will be left in for up to 2 hours. After the 2 hours your bladder will be drained via the catheter and then the catheter will be removed.    After Treatment    If you go home with the medication in your bladder, leave medication in for up to 2 hours. After the 2 hours sit down on the toilet to urinate and fully empty your bladder. Pour 2 cups of bleach in the toilet and let sit for 15 minutes with the lid shut. Flush the toilet twice and wash your hands and genitals well with soap and warm water. If any clothes, bedding or other items have touched your urine it should be washed in hot soapy water. All this needs to be done for at least 8 hours after your treatment.    Drink lots of water for at least 6 hours after you first empty your bladder    Empty your bladder often. This helps prevent problems    BCG can irritate your skin    Wash any skin that comes in contact with your urine    Change any clothing that is contaminated by urine.    Side effects      BCG can irritate the bladder    You may pass urine more often    You may have pain when you pass uine or need to pass urine at night    If these symptoms are severe, your next treatment may be delayed  or decreased    Voiding symptoms usually improve over the next 2-3 days    Important Information    Attempt to leave in your bladder up to a maximum of 2  hours    Please call the clinic at [phone] as soon as you urinate    If you are unable to urinate after 2 hours please return to the clinic as soon as possible for catheter drainage of the medication as soon as possible    Tell your dentist if you are being treated with BCG before you receive treatment with them    You may drink small amounts of alcohol while being treated with BCG  BCG may damage sperm and may harm an infant   * use birth control while being treated   * tell your doctor if you or your partner becomes pregnant   * do not breastfeed during treatment      When to call your doctor  Dr. Walker: [phone]    Rash that continues to worsen over time    Fever greater then 101    Severe pain when you pass urine that does not improve    Pain in your abdomen  If you are unable to contact Dr. Walker's office please contact your primary care provider.    After normal business hours or on the weekend please contact the emergency room  at [phone]

## 2019-07-22 ENCOUNTER — HOSPITAL ENCOUNTER (OUTPATIENT)
Dept: INFUSION THERAPY | Facility: OTHER | Age: 59
Discharge: HOME | End: 2019-07-22
Attending: UROLOGY | Admitting: UROLOGY
Payer: COMMERCIAL

## 2019-07-22 ENCOUNTER — OFFICE VISIT (OUTPATIENT)
Dept: UROLOGY | Facility: OTHER | Age: 59
End: 2019-07-22
Attending: UROLOGY
Payer: COMMERCIAL

## 2019-07-22 VITALS
HEART RATE: 75 BPM | DIASTOLIC BLOOD PRESSURE: 78 MMHG | RESPIRATION RATE: 16 BRPM | SYSTOLIC BLOOD PRESSURE: 133 MMHG | TEMPERATURE: 97.6 F

## 2019-07-22 DIAGNOSIS — Z85.51 HISTORY OF BLADDER CANCER: Primary | ICD-10-CM

## 2019-07-22 DIAGNOSIS — C64.1 UROTHELIAL CARCINOMA OF KIDNEY, RIGHT (H): ICD-10-CM

## 2019-07-22 LAB
ALBUMIN UR-MCNC: NEGATIVE MG/DL
APPEARANCE UR: CLEAR
BILIRUB UR QL STRIP: NEGATIVE
COLOR UR AUTO: YELLOW
GLUCOSE UR STRIP-MCNC: NEGATIVE MG/DL
HGB UR QL STRIP: NEGATIVE
KETONES UR STRIP-MCNC: NEGATIVE MG/DL
LEUKOCYTE ESTERASE UR QL STRIP: NEGATIVE
NITRATE UR QL: NEGATIVE
PH UR STRIP: 5.5 PH (ref 5–9)
SOURCE: ABNORMAL
SP GR UR STRIP: >1.03 (ref 1–1.03)
UROBILINOGEN UR STRIP-ACNC: 0.2 EU/DL (ref 0.2–1)

## 2019-07-22 PROCEDURE — 81003 URINALYSIS AUTO W/O SCOPE: CPT | Mod: ZL | Performed by: UROLOGY

## 2019-07-22 PROCEDURE — 99207 ZZC NO CHARGE LOS: CPT | Performed by: UROLOGY

## 2019-07-22 PROCEDURE — 25000128 H RX IP 250 OP 636: Performed by: UROLOGY

## 2019-07-22 PROCEDURE — 51720 TREATMENT OF BLADDER LESION: CPT

## 2019-07-22 RX ADMIN — BACILLUS CALMETTE-GUERIN 50 MG: 50 POWDER, FOR SUSPENSION INTRAVESICAL at 09:15

## 2019-07-22 NOTE — PATIENT INSTRUCTIONS
Before treatment    Do not drink liquids for 4 hours before you receive your treatment    Please eat normally before your appointment    A urine sample must be tested for possible bladder  infection just prior to each treatment. If the lab shows an infection, it must be treated before the next BCG treatment can be done.    If you see blood in your urine, the procedure will be cancelled    During the treatment:    The medication will be instilled into your bladder through a catheter    The catheter will be removed and you can go home if you do not have leaking or retention of urine. If you are staying the catheter will be left in for up to 2 hours. After the 2 hours your bladder will be drained via the catheter and then the catheter will be removed.    After Treatment    If you go home with the medication in your bladder, leave medication in for up to 2 hours. After the 2 hours sit down on the toilet to urinate and fully empty your bladder. Pour 2 cups of bleach in the toilet and let sit for 15 minutes with the lid shut. Flush the toilet twice and wash your hands and genitals well with soap and warm water. If any clothes, bedding or other items have touched your urine it should be washed in hot soapy water. All this needs to be done for at least 8 hours after your treatment.    Drink lots of water for at least 6 hours after you first empty your bladder    Empty your bladder often. This helps prevent problems    BCG can irritate your skin    Wash any skin that comes in contact with your urine    Change any clothing that is contaminated by urine.    Side effects      BCG can irritate the bladder    You may pass urine more often    You may have pain when you pass uine or need to pass urine at night    If these symptoms are severe, your next treatment may be delayed  or decreased    Voiding symptoms usually improve over the next 2-3 days    Important Information    Attempt to leave in your bladder up to a maximum of 2  hours    Please call the clinic at [phone] as soon as you urinate    If you are unable to urinate after 2 hours please return to the clinic as soon as possible for catheter drainage of the medication as soon as possible    Tell your dentist if you are being treated with BCG before you receive treatment with them    You may drink small amounts of alcohol while being treated with BCG  BCG may damage sperm and may harm an infant   * use birth control while being treated   * tell your doctor if you or your partner becomes pregnant   * do not breastfeed during treatment      When to call your doctor  Dr. Walker: [phone]    Rash that continues to worsen over time    Fever greater then 101    Severe pain when you pass urine that does not improve    Pain in your abdomen  If you are unable to contact Dr. Walker's office please contact your primary care provider.    After normal business hours or on the weekend please contact the emergency room  at [phone]

## 2019-07-22 NOTE — PROGRESS NOTES
Called patient as she was supposed to void at 1115 and call to alert us. After verifying name and date of birth.Patient stated she was able to void after 2 hours without problems or difficulty but forgot to call. Will return next week for treatment #6. Adri Harrell RN on 7/22/2019 at 12:00 PM

## 2019-07-22 NOTE — PROGRESS NOTES
BCG  Patient presents to the clinic for #5 of 6 BCG bladder treatment for cancer. Lab order completed for UA with micro. Lab results were within parameters ordered by Dr. Walker. No fever, cough or chills. Patient seen by Dr. Walker prior to infusion appt and approved for procedure today. Patient prepped and draped in sterile manner. #16 English - 5 ml bateman inserted without difficulty, bladder emptied clear yellow urine. At 0915 AM 50 mg of Brenda BCG solution inserted via the bateman catheter into the bladder. Chemo precautions used.  Catheter removed. Patient tolerated treatment well. Patient reminded to retain BCG solution in the bladder for two hours and call office after voiding at home. Discharge instructions reviewed with patient. Patient will return on 7/29/19 for #6 BCG treatment. Supervised by Dr. Walker. Adri Harrell RN on 7/22/2019 at 9:33 AM

## 2019-07-22 NOTE — PROGRESS NOTES
The patient presents today for urinalysis collection prior to intravesical induction BCG #5 of 6.  The patient denies gross hematuria.  The patient also denies new acute irritative urinary symptoms such as dysuria and urgency.    Results for orders placed or performed in visit on 07/22/19   UA reflex to Microscopic   Result Value Ref Range    Color Urine Yellow     Appearance Urine Clear     Glucose Urine Negative NEG^Negative mg/dL    Bilirubin Urine Negative NEG^Negative    Ketones Urine Negative NEG^Negative mg/dL    Specific Gravity Urine >1.030 (H) 1.000 - 1.030    Blood Urine Negative NEG^Negative    pH Urine 5.5 5.0 - 9.0 pH    Protein Albumin Urine Negative NEG^Negative mg/dL    Urobilinogen Urine 0.2 0.2 - 1.0 EU/dL    Nitrite Urine Negative NEG^Negative    Leukocyte Esterase Urine Negative NEG^Negative    Source Midstream Urine        Plan  Patient will undergo BCG installation today as planned  Follow-up next week for repeat treatment

## 2019-07-29 ENCOUNTER — OFFICE VISIT (OUTPATIENT)
Dept: UROLOGY | Facility: OTHER | Age: 59
End: 2019-07-29
Attending: UROLOGY
Payer: COMMERCIAL

## 2019-07-29 ENCOUNTER — HOSPITAL ENCOUNTER (OUTPATIENT)
Dept: INFUSION THERAPY | Facility: OTHER | Age: 59
Discharge: HOME | End: 2019-07-29
Attending: UROLOGY | Admitting: UROLOGY
Payer: COMMERCIAL

## 2019-07-29 VITALS
BODY MASS INDEX: 24.87 KG/M2 | TEMPERATURE: 96.8 F | HEART RATE: 87 BPM | DIASTOLIC BLOOD PRESSURE: 59 MMHG | WEIGHT: 131.6 LBS | RESPIRATION RATE: 18 BRPM | SYSTOLIC BLOOD PRESSURE: 129 MMHG

## 2019-07-29 DIAGNOSIS — Z85.51 HISTORY OF BLADDER CANCER: Primary | ICD-10-CM

## 2019-07-29 DIAGNOSIS — C64.1 UROTHELIAL CARCINOMA OF KIDNEY, RIGHT (H): ICD-10-CM

## 2019-07-29 LAB
ALBUMIN UR-MCNC: NEGATIVE MG/DL
APPEARANCE UR: CLEAR
BILIRUB UR QL STRIP: NEGATIVE
COLOR UR AUTO: YELLOW
GLUCOSE UR STRIP-MCNC: NEGATIVE MG/DL
HGB UR QL STRIP: NEGATIVE
KETONES UR STRIP-MCNC: NEGATIVE MG/DL
LEUKOCYTE ESTERASE UR QL STRIP: NEGATIVE
NITRATE UR QL: NEGATIVE
PH UR STRIP: 5.5 PH (ref 5–9)
SOURCE: NORMAL
SP GR UR STRIP: 1.01 (ref 1–1.03)
UROBILINOGEN UR STRIP-ACNC: 0.2 EU/DL (ref 0.2–1)

## 2019-07-29 PROCEDURE — 25000128 H RX IP 250 OP 636: Performed by: UROLOGY

## 2019-07-29 PROCEDURE — 81003 URINALYSIS AUTO W/O SCOPE: CPT | Mod: ZL | Performed by: UROLOGY

## 2019-07-29 PROCEDURE — 51720 TREATMENT OF BLADDER LESION: CPT

## 2019-07-29 PROCEDURE — 99207 ZZC NO CHARGE LOS: CPT | Performed by: UROLOGY

## 2019-07-29 RX ADMIN — BACILLUS CALMETTE-GUERIN 50 MG: 50 POWDER, FOR SUSPENSION INTRAVESICAL at 10:23

## 2019-07-29 NOTE — PROGRESS NOTES
BCG  Patient presents to the clinic for #6 of 6 BCG bladder treatment for cancer. Lab order completed for UA with micro at urology. Lab results were within parameters ordered by Dr. Walker. No fever, cough or chills. No visible blood noted in the urine.  Seen by Dr. Walker in office. Patient prepped and draped in sterile manner. #16 Hebrew - 5 ml latex bateman inserted without difficulty, bladder emptied clear yellow urine. At 1028AM 50 mg of Brenda BCG solution inserted via the bateman catheter into the bladder. Chemo precautions used.  Catheter removed. Patient tolerated treatment well. Patient reminded to retain BCG solution in the bladder for two hours and call office after voiding at home. Discharge instructions reviewed with patient.  Patient will follow up with Dr. Walker next month.  Supervised by Dr. Walker.  Becca Tucker RN on 7/29/2019 at 10:55 AM

## 2019-07-29 NOTE — PROGRESS NOTES
The patient presents today for urinalysis collection prior to intravesical induction BCG #6 of 6.  The patient denies gross hematuria.  The patient also denies new acute irritative urinary symptoms such as dysuria and urgency.    Results for orders placed or performed in visit on 07/29/19   *UA reflex to Microscopic   Result Value Ref Range    Color Urine Yellow     Appearance Urine Clear     Glucose Urine Negative NEG^Negative mg/dL    Bilirubin Urine Negative NEG^Negative    Ketones Urine Negative NEG^Negative mg/dL    Specific Gravity Urine 1.010 1.000 - 1.030    Blood Urine Negative NEG^Negative    pH Urine 5.5 5.0 - 9.0 pH    Protein Albumin Urine Negative NEG^Negative mg/dL    Urobilinogen Urine 0.2 0.2 - 1.0 EU/dL    Nitrite Urine Negative NEG^Negative    Leukocyte Esterase Urine Negative NEG^Negative    Source Midstream Urine        Plan  Patient will undergo BCG installation today as planned  Follow-up for surveillance cystoscopy in September in [place]

## 2019-07-29 NOTE — PROGRESS NOTES
Patient called stating that she was able to empty bladder with out any concerns. She will follow up with Dr. Walker as instructed.  Jean S. Hammann, RN on 7/29/2019 at 1:42 PM

## 2019-09-11 ENCOUNTER — OFFICE VISIT (OUTPATIENT)
Dept: UROLOGY | Facility: OTHER | Age: 59
End: 2019-09-11
Attending: UROLOGY
Payer: COMMERCIAL

## 2019-09-11 VITALS
BODY MASS INDEX: 24.84 KG/M2 | DIASTOLIC BLOOD PRESSURE: 68 MMHG | RESPIRATION RATE: 16 BRPM | HEIGHT: 62 IN | HEART RATE: 80 BPM | SYSTOLIC BLOOD PRESSURE: 112 MMHG | TEMPERATURE: 98.3 F | OXYGEN SATURATION: 97 % | WEIGHT: 135 LBS

## 2019-09-11 DIAGNOSIS — Z85.51 HISTORY OF BLADDER CANCER: Primary | ICD-10-CM

## 2019-09-11 PROCEDURE — 52000 CYSTOURETHROSCOPY: CPT | Performed by: UROLOGY

## 2019-09-11 PROCEDURE — 99213 OFFICE O/P EST LOW 20 MIN: CPT | Mod: 25 | Performed by: UROLOGY

## 2019-09-11 ASSESSMENT — PAIN SCALES - GENERAL: PAINLEVEL: NO PAIN (0)

## 2019-09-11 ASSESSMENT — MIFFLIN-ST. JEOR: SCORE: 1140.61

## 2019-09-11 NOTE — NURSING NOTE
"Chief Complaint   Patient presents with     Cystoscopy     HX of bladder cancer        Initial /68   Pulse 80   Temp 98.3  F (36.8  C) (Tympanic)   Resp 16   Ht 1.575 m (5' 2\")   Wt 61.2 kg (135 lb)   LMP 06/18/2005   SpO2 97%   BMI 24.69 kg/m   Estimated body mass index is 24.69 kg/m  as calculated from the following:    Height as of this encounter: 1.575 m (5' 2\").    Weight as of this encounter: 61.2 kg (135 lb).  Medication Reconciliation: complete   Review of Systems:    Weight loss:    No     Recent fever/chills:  No   Night sweats:   No  Current skin rash:  No   Recent hair loss:  No  Heat intolerance:  No   Cold intolerance:  No  Chest pain:   No   Palpitations:   No  Shortness of breath:  No   Wheezing:   No  Constipation:    No   Diarrhea:   No   Nausea:   No   Vomiting:   No   Kidney/side pain:  No   Back pain:   No  Frequent headaches:  No   Dizziness:     No  Leg swelling:   No   Calf pain:    No      "

## 2019-09-11 NOTE — PATIENT INSTRUCTIONS

## 2019-09-11 NOTE — PROGRESS NOTES
"Type of Visit  Established    Chief Complaint  Bladder cancer  Right upper tract urothelial carcinoma    HPI  Ms. Augustin is a 59 year old female who recently underwent resection for a recurrent bladder tumor as well as staged right upper tract ureteroscopy with ablation of urothelial carcinoma.  She then completed induction BCG with 6 weeks of treatment.  She follows up today for surveillance cystoscopy.  She denies fevers and chills.      Review of Systems  I reviewed the ROS with the patient.    Nursing Notes:   Marilou Staley LPN  9/11/2019  9:23 AM  Sign at exiting of workspace  Chief Complaint   Patient presents with     Cystoscopy     HX of bladder cancer        Initial /68   Pulse 80   Temp 98.3  F (36.8  C) (Tympanic)   Resp 16   Ht 1.575 m (5' 2\")   Wt 61.2 kg (135 lb)   LMP 06/18/2005   SpO2 97%   BMI 24.69 kg/m    Estimated body mass index is 24.69 kg/m  as calculated from the following:    Height as of this encounter: 1.575 m (5' 2\").    Weight as of this encounter: 61.2 kg (135 lb).  Medication Reconciliation: complete   Review of Systems:    Weight loss:    No     Recent fever/chills:  No   Night sweats:   No  Current skin rash:  No   Recent hair loss:  No  Heat intolerance:  No   Cold intolerance:  No  Chest pain:   No   Palpitations:   No  Shortness of breath:  No   Wheezing:   No  Constipation:    No   Diarrhea:   No   Nausea:   No   Vomiting:   No   Kidney/side pain:  No   Back pain:   No  Frequent headaches:  No   Dizziness:     No  Leg swelling:   No   Calf pain:    No      Family History  Family History   Problem Relation Age of Onset     C.A.D. Father         dx late 60's, CABG, angioplasty     Cancer Father         Lung, dx age 80 h/o tobacco use     Heart Disease Mother      Thyroid Disease Mother      Lipids Sister        Physical Exam  /68   Pulse 80   Temp 98.3  F (36.8  C) (Tympanic)   Resp 16   Ht 1.575 m (5' 2\")   Wt 61.2 kg (135 lb)   LMP 06/18/2005  "  SpO2 97%   BMI 24.69 kg/m    Constitutional: NAD, WDWN.  Cardiovascular: Regular rate.  Pulmonary/Chest: Respirations are even and non-labored bilaterally.  Abdominal: Soft. No distension, tenderness, masses or guarding. No CVA tenderness.  Extremities: THOR x 4, Warm. No clubbing.  No cyanosis.    Skin: Pink, warm and dry.  No rashes noted.    ^^^^^^^^^^^^^^^^^^^^^^^^^^^^^^^^^^^^^^^^^^    Preoperative diagnosis  History of bladder cancer    Postoperative diagnosis  History of bladder cancer    Procedure  Flexible cystourethroscopy    Surgeon  Delfino Walker MD    Anesthesia  2% lidocaine jelly intraurethrally    Complications  None    Indications  The patient has a history of bladder cancer    Findings  No recurrent tumors.  Bladder was normal in appearance.  Right ureteral orifice was visually patulous.    Procedure  The patient was given one dose of antibiotics. The patient was placed in supine position and was prepped and draped in sterile fashion. I passed the 14 Italian flexible cystoscope through the urethra and into the bladder.  The bladder was evaluated systematically in its entirety.  The patient tolerated the procedure well.    ^^^^^^^^^^^^^^^^^^^^^^^^^^^^^^^^^^^^^^^^^^    Labs  Results for LAZARO KEYS (MRN [id number]) as of 5/13/2019 13:23   5/1/2019 11:16 5/1/2019 11:21   Sodium 141    Potassium 4.2    Chloride 110 (H)    Carbon Dioxide 26    Urea Nitrogen 13    Creatinine 0.70    GFR Estimate >90    GFR Estimate If Black >90    Calcium 8.9    Anion Gap 5    Albumin 4.1    Protein Total 7.3    Bilirubin Total 0.5    Alkaline Phosphatase 106    ALT 27    AST 11    Glucose 84    WBC 7.3    Hemoglobin 14.7    Hematocrit 43.2    Platelet Count 224    RBC Count 4.63    MCV 93    MCH 31.7    MCHC 34.0    RDW 13.1    Diff Method Automated Method    % Neutrophils 55.0    % Lymphocytes 33.2    % Monocytes 9.0    % Eosinophils 2.1    % Basophils 0.7    Absolute Neutrophil 4.0    Absolute Lymphocytes 2.4     Absolute Monocytes 0.7    Absolute Eosinophils 0.2    Absolute Basophils 0.1    Color Urine  Yellow   Appearance Urine  Clear   Glucose Urine  Negative   Bilirubin Urine  Negative   Ketones Urine  Negative   Specific Gravity Urine  1.015   pH Urine  6.5   Protein Albumin Urine  Negative   Urobilinogen Urine  0.2   Nitrite Urine  Negative   Blood Urine  Negative   Leukocyte Esterase Urine  Negative   Source  Midstream Urine       Pathology  I personally reviewed the pathology report  5/7/2019  High grade Ta    5/7/2019  Right upper tract with low grade Ta urothelial tumor which was ablated (no biopsy sent)    She underwent TURBT 2/2015 and 12/2015.  She did receive postoperative mitomycin-C in December 2015.  Right ureteral orifice was involved.  She denies flank pain bilaterally.  Pathology was LGTa.  She denies gross hematuria.  Her last surveillance cystoscopy was 1 year ago.    Assessment  Ms. Augustin is a 59 year old female who is status post TURBT for recurrent high risk bladder cancer as well as staged right upper tract ureteroscopy with ablation of urothelial carcinoma.    TURBT   5/2019  Induction BCG X 6 weeks 6/2019  Maintenance A   12/2019  Anticipated, start after next cysto  Maintenance B   3/2020  anticipated (3 months later)  Maintenance C  9/2020  anticipated (6 months later)    Plan  Follow-up for surveillance cystoscopy, right diagnostic ureteroscopy in 3 months in the OR with MAC sedation  Follow-up cystoscopy every 3 months until 6/2021 then every 6 months until 6/2022 then once annually          I spent 15 minutes on this patient's visit and over 50% of this time was spent in face-to-face discussion and counseling regarding the diagnosis of bladder cancer, risks of recurrence and progression, rationale for surveillance with cystoscopy, indications for intravesical therapy, risks and benefits of each option.

## 2019-09-11 NOTE — NURSING NOTE
Patient positioned in frog leg position prior to Delfino Walker MD prepping patient with chlorhexidine gluconate cleanser.    [facility] Protocol    A. Pre-procedure verification complete Yes   1-relevant information / documentation available, reviewed and properly matched to the patient; 2-consent accurate and complete, 3-equipment and supplies available    B. Site marking complete N/A  Site marked if not in continuous attendance with patient    C. TIME OUT completed Yes  Time Out was conducted just prior to starting procedure to verify the eight required elements: 1-patient identity, 2-consent accurate and complete, 3-position, 4-correct side/site marked (if applicable), 5-procedure, 6-relevant images / results properly labeled and displayed (if applicable), 7-antibiotics / irrigation fluids (if applicable), 8-safety precautions.    After procedure perineum area rinsed. Discharge instructions reviewed with patient. Patient verbalized understanding of discharge instructions and discharged ambulatory.  Mariza Rubin RN..................9/11/2019  9:45 AM

## 2019-09-12 ENCOUNTER — HOSPITAL ENCOUNTER (OUTPATIENT)
Facility: OTHER | Age: 59
End: 2019-09-12
Attending: UROLOGY | Admitting: UROLOGY
Payer: COMMERCIAL

## 2019-11-19 ENCOUNTER — TELEPHONE (OUTPATIENT)
Dept: UROLOGY | Facility: OTHER | Age: 59
End: 2019-11-19

## 2019-11-19 NOTE — TELEPHONE ENCOUNTER
Patient left voicemail stating they are cancelling their procedure scheduled for 12-10-19 (Cytoscopy & Diagnostic Ureteroscopy.)    I did call patient back to actually speak with them, they were not available. Voicemail was left requesting patient call Urology Scheduling to speak regarding Procedure cancellation.

## 2019-11-19 NOTE — TELEPHONE ENCOUNTER
"Pt.'s last office visit with Delfino Walker MD was on 9/11/19 and note states:  \"Plan  Follow-up for surveillance cystoscopy, right diagnostic ureteroscopy in 3 months in the OR with MAC sedation  Follow-up cystoscopy every 3 months until 6/2021 then every 6 months until 6/2022 then once annually\"  To MD to address plan going forward.  Mariza Rubin RN.........11/19/2019...10:08 AM   "

## 2019-11-19 NOTE — TELEPHONE ENCOUNTER
Patient did call back and conveyed that the reason they are cancelling surgery, is that they cannot afford another surgery. Please call patient, if needed. Thank you.

## 2020-03-10 ENCOUNTER — TELEPHONE (OUTPATIENT)
Dept: UROLOGY | Facility: OTHER | Age: 60
End: 2020-03-10

## 2020-03-10 NOTE — TELEPHONE ENCOUNTER
Left message to call back.  Patient is overdue to have a cystoscopy and diagnostic ureteroscopy surgery.  Per Delfino Walker MD patient should try to schedule or if an alternate schedule is requested by patient, that can be discussed.  Mariza Rubin RN......March 10, 2020...2:21 PM

## 2020-03-17 NOTE — TELEPHONE ENCOUNTER
Will attempt to contact patient at a later date per Delfino Walker MD.  Mariza Rubin RN......March 17, 2020...11:49 AM

## 2020-04-20 ENCOUNTER — NURSE TRIAGE (OUTPATIENT)
Dept: FAMILY MEDICINE | Facility: OTHER | Age: 60
End: 2020-04-20

## 2020-04-20 NOTE — TELEPHONE ENCOUNTER
"Protocol advises patient to be seen within 24 hours. Patient scheduled with walk in clinic tomorrow.    Reason for Disposition    Numbness in one foot (i.e., loss of sensation)    Additional Information    Negative: Followed a foot injury    Negative: Diabetes    Negative: Ankle pain is main symptom    Negative: Thigh or calf pain is main symptom    Negative: Entire foot is cool or blue in comparison to other foot    Negative: Purple or black skin on foot or toe    Negative: [1] Red area or streak AND [2] fever    Negative: [1] Swollen foot AND [2] fever    Negative: Patient sounds very sick or weak to the triager    Negative: [1] SEVERE pain (e.g., excruciating, unable to do any normal activities) AND [2] not improved after 2 hours of pain medicine    Negative: [1] Looks infected (spreading redness, pus) AND [2] large red area (> 2 in. or 5 cm)    Negative: Looks like a boil, infected sore, or deep ulcer    Negative: [1] Redness of the skin AND [2] no fever    Negative: [1] Swollen foot AND [2] no fever  (Exceptions: localized bump from bunions, calluses, insect bite, sting)    Answer Assessment - Initial Assessment Questions  1. ONSET: \"When did the pain start?\"       February  2. LOCATION: \"Where is the pain located?\"       Right foot  3. PAIN: \"How bad is the pain?\"    (Scale 1-10; or mild, moderate, severe)    -  MILD (1-3): doesn't interfere with normal activities     -  MODERATE (4-7): interferes with normal activities (e.g., work or school) or awakens from sleep, limping     -  SEVERE (8-10): excruciating pain, unable to do any normal activities, unable to walk      9/10   4. WORK OR EXERCISE: \"Has there been any recent work or exercise that involved this part of the body?\"       Yes. Always on her feet for work.   5. CAUSE: \"What do you think is causing the foot pain?\"      Bone spur  6. OTHER SYMPTOMS: \"Do you have any other symptoms?\" (e.g., leg pain, rash, fever, numbness)      Leg pain, numbness in two " "toes  7. PREGNANCY: \"Is there any chance you are pregnant?\" \"When was your last menstrual period?\"      No    Protocols used: FOOT PAIN-A-AH      "

## 2020-04-21 ENCOUNTER — ANCILLARY PROCEDURE (OUTPATIENT)
Dept: GENERAL RADIOLOGY | Facility: OTHER | Age: 60
End: 2020-04-21
Attending: NURSE PRACTITIONER
Payer: COMMERCIAL

## 2020-04-21 ENCOUNTER — OFFICE VISIT (OUTPATIENT)
Dept: FAMILY MEDICINE | Facility: OTHER | Age: 60
End: 2020-04-21
Attending: NURSE PRACTITIONER
Payer: COMMERCIAL

## 2020-04-21 VITALS
RESPIRATION RATE: 16 BRPM | HEIGHT: 62 IN | DIASTOLIC BLOOD PRESSURE: 66 MMHG | BODY MASS INDEX: 24.48 KG/M2 | HEART RATE: 83 BPM | OXYGEN SATURATION: 97 % | SYSTOLIC BLOOD PRESSURE: 112 MMHG | WEIGHT: 133 LBS | TEMPERATURE: 97.8 F

## 2020-04-21 DIAGNOSIS — M79.671 RIGHT FOOT PAIN: Primary | ICD-10-CM

## 2020-04-21 PROCEDURE — 73630 X-RAY EXAM OF FOOT: CPT | Mod: TC

## 2020-04-21 PROCEDURE — 99213 OFFICE O/P EST LOW 20 MIN: CPT

## 2020-04-21 ASSESSMENT — ENCOUNTER SYMPTOMS
MYALGIAS: 1
WEAKNESS: 0
BACK PAIN: 1
NUMBNESS: 1
PARESTHESIAS: 0
COLOR CHANGE: 0
PSYCHIATRIC NEGATIVE: 1
FEVER: 0
COUGH: 0
ARTHRALGIAS: 1

## 2020-04-21 ASSESSMENT — ANXIETY QUESTIONNAIRES
6. BECOMING EASILY ANNOYED OR IRRITABLE: MORE THAN HALF THE DAYS
3. WORRYING TOO MUCH ABOUT DIFFERENT THINGS: NOT AT ALL
2. NOT BEING ABLE TO STOP OR CONTROL WORRYING: MORE THAN HALF THE DAYS
4. TROUBLE RELAXING: NOT AT ALL
1. FEELING NERVOUS, ANXIOUS, OR ON EDGE: SEVERAL DAYS
GAD7 TOTAL SCORE: 5
IF YOU CHECKED OFF ANY PROBLEMS ON THIS QUESTIONNAIRE, HOW DIFFICULT HAVE THESE PROBLEMS MADE IT FOR YOU TO DO YOUR WORK, TAKE CARE OF THINGS AT HOME, OR GET ALONG WITH OTHER PEOPLE: NOT DIFFICULT AT ALL
7. FEELING AFRAID AS IF SOMETHING AWFUL MIGHT HAPPEN: NOT AT ALL
5. BEING SO RESTLESS THAT IT IS HARD TO SIT STILL: NOT AT ALL

## 2020-04-21 ASSESSMENT — MIFFLIN-ST. JEOR: SCORE: 1131.53

## 2020-04-21 ASSESSMENT — PATIENT HEALTH QUESTIONNAIRE - PHQ9: SUM OF ALL RESPONSES TO PHQ QUESTIONS 1-9: 3

## 2020-04-21 ASSESSMENT — PAIN SCALES - GENERAL: PAINLEVEL: MODERATE PAIN (5)

## 2020-04-21 NOTE — PROGRESS NOTES
Subjective     Kait Augustin is a 59 year old female who presents to clinic today for the following health issues:    HPI   Musculoskeletal problem/pain      Duration: 2 months Since february    Description  Location: Right foot    Intensity:  5/10    Accompanying signs and symptoms: pain especially to ball of foot.   radiation of pain to ankle, numbness, tingling and weakness of ankle and foot,  thinks getting hammer toe to 2nd toe.      History   Previous similar problem: YES- several years ago Had injection to foot, which helped.    Previous evaluation:  Nothing recent    Precipitating or alleviating factors:  Trauma or overuse: works long hours on feet.    Tried new shoes.    Aggravating factors include: standing, walking and climbing stairs  Works 9 hours 5 times a week on feet.      Therapies tried and outcome: rest/inactivity, heat, ice, stretching, acetaminophen and Ibuprofen        Patient Active Problem List   Diagnosis     H/O renal calculi     Malignant neoplasm of overlapping sites of bladder (H)     ACP (advance care planning)     Urothelial carcinoma of kidney, right (H)     History of bladder cancer     Past Surgical History:   Procedure Laterality Date     C LIGATE FALLOPIAN TUBE  1996     CYSTOSCOPY, RETROGRADES, INSERT STENT URETER(S), COMBINED Right 12/8/2015    Procedure: COMBINED CYSTOSCOPY, RETROGRADES, INSERT STENT URETER(S);  Surgeon: Efren Couch MD;  Location: [facility] OR     CYSTOSCOPY, TRANSURETHRAL RESECTION (TUR) PROSTATE, COMBINED Bilateral 5/7/2019    Procedure: Transurethral Resection of Bladder Tumor, BILATERAL RETROGRADE PYELOGRAMS, RIGHT URETEROSCOPY WITH HOLMIUM LASER TUMOR ABLATION AND STENT PLACEMENT;  Surgeon: Delfino Walker MD;  Location:  OR     CYSTOSCOPY, TRANSURETHRAL RESECTION (TUR) TUMOR BLADDER, COMBINED N/A 2/10/2015    Procedure: COMBINED CYSTOSCOPY, TRANSURETHRAL RESECTION (TUR) TUMOR BLADDER;  Surgeon: Efren Couch MD;  Location: [facility]  "OR     CYSTOSCOPY, TRANSURETHRAL RESECTION (TUR) TUMOR BLADDER, COMBINED N/A 12/8/2015    Procedure: COMBINED CYSTOSCOPY, TRANSURETHRAL RESECTION (TUR) TUMOR BLADDER;  Surgeon: Efren Couch MD;  Location: [facility] OR     CYSTOSCOPY, URETEROSCOPY, COMBINED Right 5/28/2019    Procedure: Right Ureteroscopy, Fulgaration if Right iUpper Tract Urothelial Carcinoma;  Surgeon: Delfino Walker MD;  Location:  OR       Social History     Tobacco Use     Smoking status: Smoker, Current Status Unknown     Packs/day: 1.00     Years: 45.00     Pack years: 45.00     Types: Cigarettes     Smokeless tobacco: Never Used   Substance Use Topics     Alcohol use: Yes     Comment: rare     Family History   Problem Relation Age of Onset     C.A.D. Father         dx late 60's, CABG, angioplasty     Cancer Father         Lung, dx age 80 h/o tobacco use     Heart Disease Mother      Thyroid Disease Mother      Lipids Sister          No current outpatient medications on file.     Allergies   Allergen Reactions     No Known Allergies          Reviewed and updated as needed this visit by Provider         Review of Systems   Constitutional: Negative for fever.   Respiratory: Negative for cough.    Musculoskeletal: Positive for arthralgias, back pain, gait problem and myalgias.        Hx back pain .  Right side of foot hurts at times.     Skin: Negative for color change and rash.   Neurological: Positive for numbness. Negative for weakness and paresthesias.        Numbness to right 2nd toe at times.     Psychiatric/Behavioral: Negative.             Objective    LMP 06/18/2005   There is no height or weight on file to calculate BMI. /66 (BP Location: Left arm, Patient Position: Sitting, Cuff Size: Adult Regular)   Pulse 83   Temp 97.8  F (36.6  C) (Tympanic)   Resp 16   Ht 1.575 m (5' 2\")   Wt 60.3 kg (133 lb)   LMP 06/18/2005   SpO2 97%   BMI 24.33 kg/m      Physical Exam  Constitutional:       Appearance: Normal appearance. "   Musculoskeletal: Normal range of motion.         General: Tenderness present. No swelling or signs of injury.      Right lower leg: No edema.      Left lower leg: No edema.      Comments: Right foot with no real bunion , no tenderness to heel area, has tender area on ball of foot, under 2nd toe.   non red, slightly squishy? Ganglion cyst?  2nd toe is not flexed at this time(no hammertoe)  Pedal pulse intact.     Skin:     General: Skin is warm and dry.      Findings: No bruising, erythema or rash.   Neurological:      General: No focal deficit present.      Mental Status: She is alert and oriented to person, place, and time.      Sensory: No sensory deficit.      Gait: Gait abnormal.      Comments: Walks with small limp-   Psychiatric:         Mood and Affect: Mood normal.         Behavior: Behavior normal.        Results for orders placed or performed in visit on 04/21/20   XR FOOT RT G/E 3 VW (Clinic Performed)     Status: None    Narrative    PROCEDURE: XR FOOT RT G/E 3 VW 4/21/2020 10:21 AM    HISTORY: Right foot pain    COMPARISONS: None.    TECHNIQUE: 3 views.    FINDINGS: The AP view only there is suggestion of a transverse  nondisplaced fracture through the base of the fifth metatarsal. This  is not confirmed on other images.    No other fracture is seen and there is no dislocation.    There is degenerative change in multiple interphalangeal joints. There  is spur formation at the insertion site of the Achilles tendon and  along the plantar surface.         Impression    IMPRESSION: Questionable nondisplaced fracture through the base of the  fifth metatarsal, only seen on one view.    OREN SALDAÑA MD     ASSESSMENT / PLAN:  (M79.865) Right foot pain  (primary encounter diagnosis)  Comment: Xray result came in post discharge. Called patient. Will come back for  Walking boot   placement.    1. Will  Call if  Any radiology findings.    2. Referral to Dr. Dumont  Podiatry . Still follow up with   Tim for fracture.     3.  Wear walking boot   when up.  Do not over do with walking!   Plan: XR FOOT RT G/E 3 VW (Clinic Performed),         PODIATRY/FOOT & ANKLE SURGERY REFERRAL,

## 2020-04-21 NOTE — PATIENT INSTRUCTIONS
1. Will  Call if  Any radiology findings.    2. Referral to Dr. Dumont  Podiatry   3.  Wear shoes when up.

## 2020-04-21 NOTE — NURSING NOTE
"Chief Complaint   Patient presents with     Musculoskeletal Problem       Initial /66 (BP Location: Left arm, Patient Position: Sitting, Cuff Size: Adult Regular)   Pulse 83   Temp 97.8  F (36.6  C) (Tympanic)   Resp 16   Ht 1.575 m (5' 2\")   Wt 60.3 kg (133 lb)   LMP 06/18/2005   SpO2 97%   BMI 24.33 kg/m   Estimated body mass index is 24.33 kg/m  as calculated from the following:    Height as of this encounter: 1.575 m (5' 2\").    Weight as of this encounter: 60.3 kg (133 lb).  Medication Reconciliation: complete  Beatrice Truong MA  "

## 2020-04-22 ASSESSMENT — ANXIETY QUESTIONNAIRES: GAD7 TOTAL SCORE: 5

## 2020-06-08 NOTE — PROGRESS NOTES
"Chief complaint: Patient presents with:  Fracture: right foot  Musculoskeletal Problem: left foot pain        History of Present Illness: This 59 year old female is seen at the request of Leif for evaluation and suggestions of management of bilateral plantar forefoot pain. Pain has been ongoing since February, 2020 in the RIGHT foot. She saw her PCP around the end of April, 2020, and an x-ray revealed a possible fracture in the RIGHT foot. She started to wear a CAM boot at work. Her initial pain is much better with the boot on her foot.    Her LEFT foot started to have pain a week after wearing the boot. She normally has this type of toe pain in the RIGHT foot. She had an injection in the RIGHT foot painful toe a few years ago, and this improved.    She works at [employer] on her feet all the time on hard surfaces for nine hour shifts five days a week so she is always on her feet. She has been wearing a CAM boot at work.     She wears Danielle shoes at work. The shoes are comfortable and toe pain is usually a sign that she needs new shoes. She purchase new shoes, but the RIGHT foot was still painful.    She has tried resting her feet, heating, icing, stretching, Tylenol and IBUprofen.     She smokes 12 cigarettes a day and she is interested in the Quit Plan referral.    No further pedal complaints today.       /78 (BP Location: Right arm, Patient Position: Sitting, Cuff Size: Adult Regular)   Pulse 75   Temp 96.1  F (35.6  C) (Tympanic)   Ht 1.575 m (5' 2\")   Wt 59 kg (130 lb)   LMP 06/18/2005   SpO2 98%   BMI 23.78 kg/m      Patient Active Problem List   Diagnosis     H/O renal calculi     Malignant neoplasm of overlapping sites of bladder (H)     ACP (advance care planning)     Urothelial carcinoma of kidney, right (H)     History of bladder cancer       Past Surgical History:   Procedure Laterality Date     C LIGATE FALLOPIAN TUBE  1996     CYSTOSCOPY, RETROGRADES, INSERT STENT URETER(S), COMBINED " Right 12/8/2015    Procedure: COMBINED CYSTOSCOPY, RETROGRADES, INSERT STENT URETER(S);  Surgeon: Efren Couch MD;  Location: [facility]     CYSTOSCOPY, TRANSURETHRAL RESECTION (TUR) PROSTATE, COMBINED Bilateral 5/7/2019    Procedure: Transurethral Resection of Bladder Tumor, BILATERAL RETROGRADE PYELOGRAMS, RIGHT URETEROSCOPY WITH HOLMIUM LASER TUMOR ABLATION AND STENT PLACEMENT;  Surgeon: Delfino Walker MD;  Location: [facility]     CYSTOSCOPY, TRANSURETHRAL RESECTION (TUR) TUMOR BLADDER, COMBINED N/A 2/10/2015    Procedure: COMBINED CYSTOSCOPY, TRANSURETHRAL RESECTION (TUR) TUMOR BLADDER;  Surgeon: Efren Couch MD;  Location: [facility]     CYSTOSCOPY, TRANSURETHRAL RESECTION (TUR) TUMOR BLADDER, COMBINED N/A 12/8/2015    Procedure: COMBINED CYSTOSCOPY, TRANSURETHRAL RESECTION (TUR) TUMOR BLADDER;  Surgeon: Efren Couch MD;  Location: [facility]     CYSTOSCOPY, URETEROSCOPY, COMBINED Right 5/28/2019    Procedure: Right Ureteroscopy, Fulgaration if Right iUpper Tract Urothelial Carcinoma;  Surgeon: Delfino Walker MD;  Location: [facility]       No current outpatient medications on file.     No current facility-administered medications for this visit.           Allergies   Allergen Reactions     No Known Allergies        Family History   Problem Relation Age of Onset     C.A.D. 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[employer]     Employer: [employer]   Social Needs     Financial resource strain: Not on file     Food insecurity     Worry: Not on file     Inability: Not on file     Transportation needs      Medical: Not on file     Non-medical: Not on file   Tobacco Use     Smoking status: Smoker, Current Status Unknown     Packs/day: 1.00     Years: 45.00     Pack years: 45.00     Types: Cigarettes     Smokeless tobacco: Never Used   Substance and Sexual Activity     Alcohol use: Yes     Comment: rare     Drug use: No     Sexual activity: Yes     Partners: Male   Lifestyle     Physical activity     Days per week: Not on file     Minutes per session: Not on file     Stress: Not on file   Relationships     Social connections     Talks on phone: Not on file     Gets together: Not on file     Attends Buddhist service: Not on file     Active member of club or organization: Not on file     Attends meetings of clubs or organizations: Not on file     Relationship status: Not on file     Intimate partner violence     Fear of current or ex partner: Not on file     Emotionally abused: Not on file     Physically abused: Not on file     Forced sexual activity: Not on file   Other Topics Concern     Parent/sibling w/ CABG, MI or angioplasty before 65F 55M? Yes     Comment: Mom was in her 60's and had angioplasty   Social History Narrative     Not on file       ROS: 10 point ROS neg other than the symptoms noted above in the HPI.  EXAM  Constitutional: healthy, alert and no distress    Psychiatric: mentation appears normal and affect normal/bright    VASCULAR:  -Dorsalis pedis pulse +2/4 b/l  -Posterior tibial pulse +1/4 b/l  -Capillary refill time < 3 seconds to b/l hallux  NEURO:  -Light touch sensation intact to b/l plantar forefoot  DERM:  -Skin temperature within normal limits   -Skin mildly thin and shiny to bilateral feet  MSK:  -Pain on palpation to bilateral dorsal 4th interspace between metatarsal heads  -Moderate decrease in arch height while patient is NWB  -Prominent metatarsal heads 2-5 bilaterally with fat pad atrophy  -DORSIFLEXION contracture to MTPJ 2-5 b/l with flexion contracture to PIPJ of digits 2-5 b/l with  the RIGHT 2nd digit with the most flexion deformity  -Muscle strength of ankles +5/5 for dorsiflexion, plantarflexion, ABDUction and ADDuction b/l      RADIOGRAPH RIGHT FOOT 04/21/2020  FINDINGS: The AP view only there is suggestion of a transverse nondisplaced fracture through the base of the fifth metatarsal. This is not confirmed on other images.  No other fracture is seen and there is no dislocation.  There is degenerative change in multiple interphalangeal joints. There is spur formation at the insertion site of the Achilles tendon and along the plantar surface.  IMPRESSION: Questionable nondisplaced fracture through the base of the fifth metatarsal, only seen on one view.    RADIOGRAPH RIGHT FOOT 06/09/2020  FINDINGS:  There is bony spurring at the insertion of the Achilles tendon and plantar fascia into the calcaneus. There is a questionable nondisplaced fractures of the proximal fifth metatarsal unchanged in alignment from previous examination on April 21, 2020.  IMPRESSION: No change from April 21, 2020    MARLA SHEN MD  ============================================================    ASSESSMENT:  (G57.61) Lesion of right plantar nerve  (primary encounter diagnosis)    (G57.62) Lesion of left plantar nerve    (M21.41,  M21.42) Pes planus of both feet    (S92.354A) Closed nondisplaced fracture of fifth metatarsal bone of right foot, initial encounter    (M21.6X1) Prominent metatarsal head of right foot    (M21.6X2) Prominent metatarsal head of left foot    (M20.41,  M20.42) Acquired hammer toe deformity of lesser toe of both feet    (F17.200) Nicotine dependence, uncomplicated, unspecified nicotine product type    (F17.210) Cigarette nicotine dependence without complication      PLAN:  -Patient evaluated and examined. Treatment options discussed with no educational barriers noted.  -Explained neuroma pain including causes and treatment. She has opted for an injection today since this has worked well for  her in the past  -Radiographs show a questionable fracture of the 5th metatarsal base -- this area has never been painful so it is unlikely a true fracture although it could have been an early fracture due to compensating for the neuroma pain. Will repeat x-rays today to ensure no progression of the potential fracture.  -Radiographs ordered RIGHT foot  ---Same fracture is identified with evidence of bone callus suggesting previous fracture. She has been pain free and in a CAM boot for six weeks so she may still transition to a CAM boot, but she should slowly transition to a regular shoe and not work in a regular shoe for two more weeks. She is advised to call the office for an earlier appointment right away if she is experiencing increased pain.    -Patient has been offloading in a CAM boot six weeks -- she may start progressing to a regular shoe.  -Shoe Gear: Discussed proper shoe gear with patient. This involves wearing a stability shoe that bends at the toe, but has a solid midfoot shank and heel contour.     -Orthotic referral through  for CMO with a metatarsal pad or a solid OTC insert for additional foot support. Patient educated on slowly transitioning into the inserts. The patient may call the orthotist to make adjustments if the inserts are not comfortable after consistently wearing them for 4-6 weeks.     -Injection: Obtained written and verbal consent from patient for a steroid injection into the dorsal 4th interspace of the bilateral foot. Reviewed risks and benefits of the injection. Informed patient that the injection may decrease pain long-term, short-term, or not at all. Patient in agreement with an injection today in an effort to slow or reverse the chronic inflammatory process and pain in the foot.   ---Patient signed informed consent and a time-out was performed and there was verification of correct patient, procedure and laterality.  ---Prepped the injection site with an alcohol  swab.  ---Injected a total of 2 mL of 1:1 of 4mg Dexamethasone and 2% Lidocaine plain into each foot. Patient tolerated the injection well.    -Tobacco cessation discussed with patient including the benefits of quitting and the risks of not quitting. The Quit Plan referral was offered and patient stated she wants the referral. Patient is interested in quitting today.    -Patient in agreement with the above treatment plan and all of patient's questions were answered.      RTC 3 1/2 months for bilateral 4th interspace pain        Michelle Dumont DPM

## 2020-06-09 ENCOUNTER — OFFICE VISIT (OUTPATIENT)
Dept: PODIATRY | Facility: OTHER | Age: 60
End: 2020-06-09
Attending: NURSE PRACTITIONER
Payer: COMMERCIAL

## 2020-06-09 VITALS
OXYGEN SATURATION: 98 % | WEIGHT: 130 LBS | HEIGHT: 62 IN | SYSTOLIC BLOOD PRESSURE: 120 MMHG | BODY MASS INDEX: 23.92 KG/M2 | TEMPERATURE: 96.1 F | HEART RATE: 75 BPM | DIASTOLIC BLOOD PRESSURE: 78 MMHG

## 2020-06-09 DIAGNOSIS — M21.41 PES PLANUS OF BOTH FEET: ICD-10-CM

## 2020-06-09 DIAGNOSIS — G57.61 LESION OF RIGHT PLANTAR NERVE: ICD-10-CM

## 2020-06-09 DIAGNOSIS — M20.41 ACQUIRED HAMMER TOE DEFORMITY OF LESSER TOE OF BOTH FEET: ICD-10-CM

## 2020-06-09 DIAGNOSIS — G57.62 LESION OF LEFT PLANTAR NERVE: ICD-10-CM

## 2020-06-09 DIAGNOSIS — M21.42 PES PLANUS OF BOTH FEET: ICD-10-CM

## 2020-06-09 DIAGNOSIS — M21.6X1 PROMINENT METATARSAL HEAD OF RIGHT FOOT: ICD-10-CM

## 2020-06-09 DIAGNOSIS — F17.200 NICOTINE DEPENDENCE, UNCOMPLICATED, UNSPECIFIED NICOTINE PRODUCT TYPE: ICD-10-CM

## 2020-06-09 DIAGNOSIS — S92.354A CLOSED NONDISPLACED FRACTURE OF FIFTH METATARSAL BONE OF RIGHT FOOT, INITIAL ENCOUNTER: ICD-10-CM

## 2020-06-09 DIAGNOSIS — M21.6X2 PROMINENT METATARSAL HEAD OF LEFT FOOT: ICD-10-CM

## 2020-06-09 DIAGNOSIS — M20.42 ACQUIRED HAMMER TOE DEFORMITY OF LESSER TOE OF BOTH FEET: ICD-10-CM

## 2020-06-09 DIAGNOSIS — G57.61 LESION OF RIGHT PLANTAR NERVE: Primary | ICD-10-CM

## 2020-06-09 DIAGNOSIS — F17.210 CIGARETTE NICOTINE DEPENDENCE WITHOUT COMPLICATION: ICD-10-CM

## 2020-06-09 PROCEDURE — 20600 DRAIN/INJ JOINT/BURSA W/O US: CPT | Mod: 50 | Performed by: PODIATRIST

## 2020-06-09 PROCEDURE — 73630 X-RAY EXAM OF FOOT: CPT | Mod: TC

## 2020-06-09 PROCEDURE — 99203 OFFICE O/P NEW LOW 30 MIN: CPT | Mod: 25 | Performed by: PODIATRIST

## 2020-06-09 RX ORDER — DEXAMETHASONE SODIUM PHOSPHATE 4 MG/ML
4 INJECTION, SOLUTION INTRA-ARTICULAR; INTRALESIONAL; INTRAMUSCULAR; INTRAVENOUS; SOFT TISSUE ONCE
Status: COMPLETED | OUTPATIENT
Start: 2020-06-09 | End: 2020-06-09

## 2020-06-09 RX ADMIN — DEXAMETHASONE SODIUM PHOSPHATE 4 MG: 4 INJECTION, SOLUTION INTRA-ARTICULAR; INTRALESIONAL; INTRAMUSCULAR; INTRAVENOUS; SOFT TISSUE at 13:14

## 2020-06-09 RX ADMIN — DEXAMETHASONE SODIUM PHOSPHATE 4 MG: 4 INJECTION, SOLUTION INTRA-ARTICULAR; INTRALESIONAL; INTRAMUSCULAR; INTRAVENOUS; SOFT TISSUE at 13:15

## 2020-06-09 ASSESSMENT — PAIN SCALES - GENERAL: PAINLEVEL: NO PAIN (0)

## 2020-06-09 ASSESSMENT — MIFFLIN-ST. JEOR: SCORE: 1117.93

## 2020-06-09 NOTE — PROGRESS NOTES
Clinic Administered Medication Documentation      Injectable Medication Documentation    Patient was given Dexamethasone Sodium Phosphate  in bilateral feet.Prior to medication administration, verified patients identity using patient s name and date of birth. Please see MAR and medication order for additional information. Patient instructed to report any adverse reaction to staff immediately .      Was entire vial of medication used? Yes  Vial/Syringe: Single dose vial  Expiration Date:  03/2021  Was this medication supplied by the patient? No

## 2020-06-09 NOTE — NURSING NOTE
"Chief Complaint   Patient presents with     Fracture     right foot     Musculoskeletal Problem     left foot pain       Initial /78 (BP Location: Right arm, Patient Position: Sitting, Cuff Size: Adult Regular)   Pulse 75   Temp 96.1  F (35.6  C) (Tympanic)   Ht 1.575 m (5' 2\")   Wt 59 kg (130 lb)   LMP 06/18/2005   SpO2 98%   BMI 23.78 kg/m   Estimated body mass index is 23.78 kg/m  as calculated from the following:    Height as of this encounter: 1.575 m (5' 2\").    Weight as of this encounter: 59 kg (130 lb).  Medication Reconciliation: complete  Monse Coughlin LPN  "

## 2020-06-09 NOTE — LETTER
"    6/9/2020         RE: Kait Augustin  [address] [place] [address]        Dear Colleague,    Thank you for referring your patient, Kait Augustin, to the [facility]. Please see a copy of my visit note below.    Chief complaint: Patient presents with:  Fracture: right foot  Musculoskeletal Problem: left foot pain        History of Present Illness: This 59 year old female is seen at the request of Leif for evaluation and suggestions of management of bilateral plantar forefoot pain. Pain has been ongoing since February, 2020 in the RIGHT foot. She saw her PCP around the end of April, 2020, and an x-ray revealed a possible fracture in the RIGHT foot. She started to wear a CAM boot at work. Her initial pain is much better with the boot on her foot.    Her LEFT foot started to have pain a week after wearing the boot. She normally has this type of toe pain in the RIGHT foot. She had an injection in the RIGHT foot painful toe a few years ago, and this improved.    She works at [employer] on her feet all the time on hard surfaces for nine hour shifts five days a week so she is always on her feet. She has been wearing a CAM boot at work.     She wears Danielle shoes at work. The shoes are comfortable and toe pain is usually a sign that she needs new shoes. She purchase new shoes, but the RIGHT foot was still painful.    She has tried resting her feet, heating, icing, stretching, Tylenol and IBUprofen.     She smokes 12 cigarettes a day and she is interested in the Quit Plan referral.    No further pedal complaints today.       /78 (BP Location: Right arm, Patient Position: Sitting, Cuff Size: Adult Regular)   Pulse 75   Temp 96.1  F (35.6  C) (Tympanic)   Ht 1.575 m (5' 2\")   Wt 59 kg (130 lb)   LMP 06/18/2005   SpO2 98%   BMI 23.78 kg/m      Patient Active Problem List   Diagnosis     H/O renal calculi     Malignant neoplasm of overlapping sites of bladder (H)     ACP " (advance care planning)     Urothelial carcinoma of kidney, right (H)     History of bladder cancer       Past Surgical History:   Procedure Laterality Date     C LIGATE FALLOPIAN TUBE  1996     CYSTOSCOPY, RETROGRADES, INSERT STENT URETER(S), COMBINED Right 12/8/2015    Procedure: COMBINED CYSTOSCOPY, RETROGRADES, INSERT STENT URETER(S);  Surgeon: Efren Couch MD;  Location: [facility]     CYSTOSCOPY, TRANSURETHRAL RESECTION (TUR) PROSTATE, COMBINED Bilateral 5/7/2019    Procedure: Transurethral Resection of Bladder Tumor, BILATERAL RETROGRADE PYELOGRAMS, RIGHT URETEROSCOPY WITH HOLMIUM LASER TUMOR ABLATION AND STENT PLACEMENT;  Surgeon: Delfino Walker MD;  Location:  OR     CYSTOSCOPY, TRANSURETHRAL RESECTION (TUR) TUMOR BLADDER, COMBINED N/A 2/10/2015    Procedure: COMBINED CYSTOSCOPY, TRANSURETHRAL RESECTION (TUR) TUMOR BLADDER;  Surgeon: Efren Couch MD;  Location: [facility]     CYSTOSCOPY, TRANSURETHRAL RESECTION (TUR) TUMOR BLADDER, COMBINED N/A 12/8/2015    Procedure: COMBINED CYSTOSCOPY, TRANSURETHRAL RESECTION (TUR) TUMOR BLADDER;  Surgeon: Efren Couch MD;  Location: [facility]     CYSTOSCOPY, URETEROSCOPY, COMBINED Right 5/28/2019    Procedure: Right Ureteroscopy, Fulgaration if Right iUpper Tract Urothelial Carcinoma;  Surgeon: Delfino Walker MD;  Location:  OR       No current outpatient medications on file.     No current facility-administered medications for this visit.           Allergies   Allergen Reactions     No Known Allergies        Family History   Problem Relation Age of Onset     C.A.D. 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[employer]     Employer: [employer]   Social Needs     Financial resource strain: Not on file     Food insecurity     Worry: Not on file     Inability: Not on file     Transportation needs     Medical: Not on file     Non-medical: Not on file   Tobacco Use     Smoking status: Smoker, Current Status Unknown     Packs/day: 1.00     Years: 45.00     Pack years: 45.00     Types: Cigarettes     Smokeless tobacco: Never Used   Substance and Sexual Activity     Alcohol use: Yes     Comment: rare     Drug use: No     Sexual activity: Yes     Partners: Male   Lifestyle     Physical activity     Days per week: Not on file     Minutes per session: Not on file     Stress: Not on file   Relationships     Social connections     Talks on phone: Not on file     Gets together: Not on file     Attends Buddhist service: Not on file     Active member of club or organization: Not on file     Attends meetings of clubs or organizations: Not on file     Relationship status: Not on file     Intimate partner violence     Fear of current or ex partner: Not on file     Emotionally abused: Not on file     Physically abused: Not on file     Forced sexual activity: Not on file   Other Topics Concern     Parent/sibling w/ CABG, MI or angioplasty before 65F 55M? Yes     Comment: Mom was in her 60's and had angioplasty   Social History Narrative     Not on file       ROS: 10 point ROS neg other than the symptoms noted above in the HPI.  EXAM  Constitutional: healthy, alert and no distress    Psychiatric: mentation appears normal and affect normal/bright    VASCULAR:  -Dorsalis pedis pulse +2/4 b/l  -Posterior tibial pulse +1/4 b/l  -Capillary refill time < 3 seconds to b/l hallux  NEURO:  -Light touch sensation intact to b/l plantar forefoot  DERM:  -Skin temperature within normal limits   -Skin mildly thin and shiny to bilateral feet  MSK:  -Pain on palpation to bilateral dorsal 4th  interspace between metatarsal heads  -Moderate decrease in arch height while patient is NWB  -Prominent metatarsal heads 2-5 bilaterally with fat pad atrophy  -DORSIFLEXION contracture to MTPJ 2-5 b/l with flexion contracture to PIPJ of digits 2-5 b/l with the RIGHT 2nd digit with the most flexion deformity  -Muscle strength of ankles +5/5 for dorsiflexion, plantarflexion, ABDUction and ADDuction b/l      RADIOGRAPH RIGHT FOOT 04/21/2020  FINDINGS: The AP view only there is suggestion of a transverse nondisplaced fracture through the base of the fifth metatarsal. This is not confirmed on other images.  No other fracture is seen and there is no dislocation.  There is degenerative change in multiple interphalangeal joints. There is spur formation at the insertion site of the Achilles tendon and along the plantar surface.  IMPRESSION: Questionable nondisplaced fracture through the base of the fifth metatarsal, only seen on one view.    RADIOGRAPH RIGHT FOOT 06/09/2020  FINDINGS:  There is bony spurring at the insertion of the Achilles tendon and plantar fascia into the calcaneus. There is a questionable nondisplaced fractures of the proximal fifth metatarsal unchanged in alignment from previous examination on April 21, 2020.  IMPRESSION: No change from April 21, 2020    MARLA SHEN MD  ============================================================    ASSESSMENT:  (G57.61) Lesion of right plantar nerve  (primary encounter diagnosis)    (G57.62) Lesion of left plantar nerve    (M21.41,  M21.42) Pes planus of both feet    (S92.354A) Closed nondisplaced fracture of fifth metatarsal bone of right foot, initial encounter    (M21.6X1) Prominent metatarsal head of right foot    (M21.6X2) Prominent metatarsal head of left foot    (M20.41,  M20.42) Acquired hammer toe deformity of lesser toe of both feet    (F17.200) Nicotine dependence, uncomplicated, unspecified nicotine product type    (F17.210) Cigarette nicotine  dependence without complication      PLAN:  -Patient evaluated and examined. Treatment options discussed with no educational barriers noted.  -Explained neuroma pain including causes and treatment. She has opted for an injection today since this has worked well for her in the past  -Radiographs show a questionable fracture of the 5th metatarsal base -- this area has never been painful so it is unlikely a true fracture although it could have been an early fracture due to compensating for the neuroma pain. Will repeat x-rays today to ensure no progression of the potential fracture.  -Radiographs ordered RIGHT foot  ---Same fracture is identified with evidence of bone callus suggesting previous fracture. She has been pain free and in a CAM boot for six weeks so she may still transition to a CAM boot, but she should slowly transition to a regular shoe and not work in a regular shoe for two more weeks. She is advised to call the office for an earlier appointment right away if she is experiencing increased pain.    -Patient has been offloading in a CAM boot six weeks -- she may start progressing to a regular shoe.  -Shoe Gear: Discussed proper shoe gear with patient. This involves wearing a stability shoe that bends at the toe, but has a solid midfoot shank and heel contour.     -Orthotic referral through  for CMO with a metatarsal pad or a solid OTC insert for additional foot support. Patient educated on slowly transitioning into the inserts. The patient may call the orthotist to make adjustments if the inserts are not comfortable after consistently wearing them for 4-6 weeks.     -Injection: Obtained written and verbal consent from patient for a steroid injection into the dorsal 4th interspace of the bilateral foot. Reviewed risks and benefits of the injection. Informed patient that the injection may decrease pain long-term, short-term, or not at all. Patient in agreement with an injection today in an effort to  slow or reverse the chronic inflammatory process and pain in the foot.   ---Patient signed informed consent and a time-out was performed and there was verification of correct patient, procedure and laterality.  ---Prepped the injection site with an alcohol swab.  ---Injected a total of 2 mL of 1:1 of 4mg Dexamethasone and 2% Lidocaine plain into each foot. Patient tolerated the injection well.    -Tobacco cessation discussed with patient including the benefits of quitting and the risks of not quitting. The Quit Plan referral was offered and patient stated she wants the referral. Patient is interested in quitting today.    -Patient in agreement with the above treatment plan and all of patient's questions were answered.      RTC 3 1/2 months for bilateral 4th interspace pain        Michelle Dumont DPM    Clinic Administered Medication Documentation      Injectable Medication Documentation    Patient was given Dexamethasone Sodium Phosphate  in bilateral feet.Prior to medication administration, verified patients identity using patient s name and date of birth. Please see MAR and medication order for additional information. Patient instructed to report any adverse reaction to staff immediately .      Was entire vial of medication used? Yes  Vial/Syringe: Single dose vial  Expiration Date:  03/2021  Was this medication supplied by the patient? No    Again, thank you for allowing me to participate in the care of your patient.        Sincerely,        Michelle Dumont DPM

## 2020-07-23 ENCOUNTER — OFFICE VISIT (OUTPATIENT)
Dept: UROLOGY | Facility: OTHER | Age: 60
End: 2020-07-23
Attending: UROLOGY
Payer: COMMERCIAL

## 2020-07-23 VITALS — HEART RATE: 68 BPM | WEIGHT: 128 LBS | BODY MASS INDEX: 23.41 KG/M2

## 2020-07-23 DIAGNOSIS — C64.1 UROTHELIAL CARCINOMA OF KIDNEY, RIGHT (H): ICD-10-CM

## 2020-07-23 DIAGNOSIS — Z85.51 HISTORY OF BLADDER CANCER: Primary | ICD-10-CM

## 2020-07-23 DIAGNOSIS — D49.4 BLADDER TUMOR: ICD-10-CM

## 2020-07-23 PROCEDURE — 52000 CYSTOURETHROSCOPY: CPT | Performed by: UROLOGY

## 2020-07-23 PROCEDURE — 99214 OFFICE O/P EST MOD 30 MIN: CPT | Mod: 25 | Performed by: UROLOGY

## 2020-07-23 RX ORDER — CEFTRIAXONE 1 G/1
1 INJECTION, POWDER, FOR SOLUTION INTRAMUSCULAR; INTRAVENOUS
Status: CANCELLED | OUTPATIENT
Start: 2020-07-23

## 2020-07-23 RX ORDER — HYDROCODONE BITARTRATE AND ACETAMINOPHEN 5; 325 MG/1; MG/1
1-2 TABLET ORAL EVERY 4 HOURS PRN
Status: CANCELLED | OUTPATIENT
Start: 2020-07-23

## 2020-07-23 ASSESSMENT — PAIN SCALES - GENERAL: PAINLEVEL: NO PAIN (0)

## 2020-07-23 NOTE — NURSING NOTE
Patient positioned in frog leg position prior to Delfino Walker MD prepping patient with chlorhexidine gluconate cleanser.    [facility] Protocol    A. Pre-procedure verification complete Yes   1-relevant information / documentation available, reviewed and properly matched to the patient; 2-consent accurate and complete, 3-equipment and supplies available    B. Site marking complete N/A  Site marked if not in continuous attendance with patient    C. TIME OUT completed Yes  Time Out was conducted just prior to starting procedure to verify the eight required elements: 1-patient identity, 2-consent accurate and complete, 3-position, 4-correct side/site marked (if applicable), 5-procedure, 6-relevant images / results properly labeled and displayed (if applicable), 7-antibiotics / irrigation fluids (if applicable), 8-safety precautions.    After procedure perineum area rinsed. Discharge instructions reviewed with patient. Patient verbalized understanding of discharge instructions and discharged ambulatory.  Amber Eason LPN..................7/23/2020  1:12 PM

## 2020-07-23 NOTE — PATIENT INSTRUCTIONS

## 2020-07-23 NOTE — PROGRESS NOTES
Type of Visit  Established    Chief Complaint  Bladder cancer  Right upper tract urothelial carcinoma    HPI  Ms. Augustin is a 60 year old female with a history of high risk bladder cancer s/p TURBT and BCG as well as right upper tract urothelial carcinoma previously treated in a staged fashion endoscopically who follows up today for surveillance cystoscopy.  The patient was lost to follow-up during the summer 2019.  I had recommended surveillance ureteroscopy and she did not follow-up.    She denies any significant health changes in the last year.  She denies gross hematuria as well as dysuria.  She denies flank pain.    She underwent TURBT 2/2015 and 12/2015.  She did receive postoperative mitomycin-C in December 2015.  Right ureteral orifice was involved.  Pathology was LGTa.    TURBT   5/2019  Induction BCG X 6 weeks 6/2019  Maintenance A   12/2019  Anticipated, start after next cysto  Maintenance B   3/2020  anticipated (3 months later)  Maintenance C  9/2020  anticipated (6 months later)      Review of Systems  I reviewed the ROS with the patient.    Nursing Notes:   Amber Eason LPN  7/23/2020  1:29 PM  Signed  Patient positioned in frog leg position prior to Delfino Walker MD prepping patient with chlorhexidine gluconate cleanser.    [facility] Protocol    A. Pre-procedure verification complete Yes   1-relevant information / documentation available, reviewed and properly matched to the patient; 2-consent accurate and complete, 3-equipment and supplies available    B. Site marking complete N/A  Site marked if not in continuous attendance with patient    C. TIME OUT completed Yes  Time Out was conducted just prior to starting procedure to verify the eight required elements: 1-patient identity, 2-consent accurate and complete, 3-position, 4-correct side/site marked (if applicable), 5-procedure, 6-relevant images / results properly labeled and displayed (if applicable), 7-antibiotics / irrigation  fluids (if applicable), 8-safety precautions.    After procedure perineum area rinsed. Discharge instructions reviewed with patient. Patient verbalized understanding of discharge instructions and discharged ambulatory.  Amber Eason LPN..................7/23/2020  1:12 PM    Unintentional weight loss:  No  Recent fever/chills: No  Night sweats: No   Current skin rash: No   Recent hair loss: No   Heat intolerance: No   Cold intolerance: No   Chest pain: No   Palpitations: No   Shortness of breath: No  Wheezing: No   Constipation: No   Diarrhea: No   Nausea: No    Vomiting: No   Kidney/side pain: No    Back pain: No  Frequent headaches: No  Dizziness: No   Leg swelling: No   Calf pain: No    Family History  Family History   Problem Relation Age of Onset     C.A.D. Father         dx late 60's, CABG, angioplasty     Cancer Father         Lung, dx age 80 h/o tobacco use     Heart Disease Mother      Thyroid Disease Mother      Lipids Sister        Physical Exam  Pulse 68   Wt 58.1 kg (128 lb)   LMP 06/18/2005   BMI 23.41 kg/m    Constitutional: NAD, WDWN.  Cardiovascular: Regular rate.  Pulmonary/Chest: Respirations are even and non-labored bilaterally.  Abdominal: Soft. No distension, tenderness, masses or guarding. No CVA tenderness.  Extremities: THOR x 4, Warm. No clubbing.  No cyanosis.    Skin: Pink, warm and dry.  No rashes noted.    ^^^^^^^^^^^^^^^^^^^^^^^^^^^^^^^^^^^^^^^^^^    Preoperative diagnosis  History of bladder cancer    Postoperative diagnosis  History of bladder cancer  Recurrent bladder tumor    Procedure  Flexible cystourethroscopy    Surgeon  Delfino Walker MD    Anesthesia  2% lidocaine jelly intraurethrally    Complications  None    Indications  The patient has a history of bladder cancer    Findings  There was a recurrent papillary appearing bladder tumor at the level of the trigone near the right ureteral orifice.  Bladder was otherwise normal in appearance.  Right ureteral orifice was  "visually patulous.    Procedure  The patient was given one dose of antibiotics. The patient was placed in supine position and was prepped and draped in sterile fashion. I passed the 14 [place] flexible cystoscope through the urethra and into the bladder.  The bladder was evaluated systematically in its entirety.  The patient tolerated the procedure well.    ^^^^^^^^^^^^^^^^^^^^^^^^^^^^^^^^^^^^^^^^^^    Pathology  I personally reviewed the pathology report  5/7/2019  High grade Ta    5/7/2019  Right upper tract with low grade Ta urothelial tumor which was ablated (no biopsy sent)    Assessment  Ms. Augustin is a 60 year old female with a history of high risk bladder cancer as well as staged right upper tract ureteroscopy with ablation of urothelial carcinoma who follows up for cystoscopy today revealing a recurrent bladder tumor.    I have recommended resection of the bladder tumor as well as diagnostic ureteroscopy given her history of upper tract urothelial carcinoma.    I recommended formal resection of the bladder tumor identified on cystoscopy.  I explained that this is a procedure performed in the operating room with anesthesia.  I explained the risks, benefits and alternatives to the procedure.  Specifically I discussed the potential for bladder perforation which could lead to conversion to open abdominal surgery to repair the bladder.  I also discussed that when tumors are close to the ureteral orifice there is a chance I may need to leave a stent during the period of healing.    All patient's questions were answered and the patient was consented for the procedure listed below.     Plan  The patient was consented for \"transurethral resection of bladder tumor, bilateral retrograde pyelograms, right diagnostic ureteroscopy\"   26 F resectoscope   Muscle paralysis is not requested during anesthesia due to the size/location of tumor.   Preop H&P per PCP for perioperative medication management is " appreciated   Will plan to leave her catheter free as long as she can void postoperatively prior to discharge

## 2020-07-24 NOTE — H&P (VIEW-ONLY)
[facility]  [address]  [address]  [place] [address]  [phone]  Dept: [phone]    PRE-OP EVALUATION:  Today's date: 2020      Kait Augustin (: 1960) presents for pre-operative evaluation assessment as requested by Dr. Walker.  She requires evaluation and anesthesia risk assessment prior to undergoing surgery/procedure for treatment of Bladder Tumor, history of bladder cancer, urothelial carcinoma of right kidney      Proposed Surgery/ Procedure: Transurethral resection of bladder tumor, diagnostic ureteroscopy, ablation of urothelial tumor, possible fulguration through flexible ureteroscope, bilateral retrograde pyelogram.   Date of Surgery/ Procedure: 2020  Time of Surgery/ Procedure: [facility]  Hospital/Surgical Facility: [facility]  Surgery Fax Number: Note does not need to be faxed, will be available electronically in Epic.  Primary Physician: Itzel Phillip  Type of Anesthesia Anticipated: to be determined      Preoperative Questionnaire:   No - Have you ever had a heart attack or stroke?  No - Have you ever had surgery on your heart or blood vessels, such as a stent, coronary (heart) bypass, or surgery on an artery in the head, neck, heart, or legs?  No - Do you have chest pain when you are physically active?  No - Do you have a history of heart failure?  No - Do you currently have a cold, bronchitis, or symptoms of other respiratory (head and chest) infections?  No - Do you have a cough, shortness of breath, or wheezing?  No - Do you or anyone in your family have a history of blood clots?  No - Do you or anyone in your family have a serious bleeding problem, such as long-lasting bleeding after surgeries or cuts?  No - Have you ever had anemia or been told to take iron pills?  No - Have you had any abnormal blood loss such as black, tarry or bloody stools, or abnormal vaginal bleeding?  No - Have you ever had a blood transfusion?  Yes - Are you  willing to have a blood transfusion if it is medically needed before, during, or after your surgery?  No - Have you or anyone in your family ever had problems with anesthesia (sedation for surgery)?  No - Do you have sleep apnea, excessive snoring, or daytime drowsiness?   No - Do you have any artifical heart valves or other implanted medical devices, such as a pacemaker, defibrillator, or continuous glucose monitor?  No - Do you have any artifical joints?  No - Are you allergic to latex?  No - Is there any chance that you may be pregnant?      Patient has a Health Care Directive or Living Will:  NO    HPI:     HPI related to upcoming procedure:   Bladder Tumor, history of bladder cancer, urothelial carcinoma of right kidney        See problem list for active medical problems.  Problems all longstanding and stable, except as noted/documented.  See ROS for pertinent symptoms related to these conditions.      MEDICAL HISTORY:     Patient Active Problem List    Diagnosis Date Noted     Bladder tumor 07/23/2020     Priority: Medium     Added automatically from request for surgery [id number]       Urothelial carcinoma of kidney, right (H) 06/03/2019     Priority: Medium     History of bladder cancer 06/03/2019     Priority: Medium     ACP (advance care planning) 12/13/2016     Priority: Medium     Advance Care Planning 12/13/2016: ACP Review of Chart / Resources Provided:  Reviewed chart for advance care plan.  Kait Augustin has no plan or code status on file. Discussed available resources and provided with information. Confirmed code status reflects current choices pending further ACP discussions.  Confirmed/documented legally designated decision makers.  Added by Radha Parekh             Malignant neoplasm of overlapping sites of bladder (H) 12/22/2015     Priority: Medium     H/O renal calculi 11/17/2014     Priority: Medium          Past Medical History:   Diagnosis Date     Cancer (H)     Bladder Cancer      H/O renal calculi 11/17/2014     MIGRAINE HEADACHES 1996     Nicotine dependence 11/17/2014     PONV (postoperative nausea and vomiting)      Tobacco abuse 11/17/2014         Past Surgical History:   Procedure Laterality Date     C LIGATE FALLOPIAN TUBE  1996     CYSTOSCOPY, RETROGRADES, INSERT STENT URETER(S), COMBINED Right 12/8/2015    Procedure: COMBINED CYSTOSCOPY, RETROGRADES, INSERT STENT URETER(S);  Surgeon: Efren Couch MD;  Location: [facility] OR     CYSTOSCOPY, TRANSURETHRAL RESECTION (TUR) PROSTATE, COMBINED Bilateral 5/7/2019    Procedure: Transurethral Resection of Bladder Tumor, BILATERAL RETROGRADE PYELOGRAMS, RIGHT URETEROSCOPY WITH HOLMIUM LASER TUMOR ABLATION AND STENT PLACEMENT;  Surgeon: Delfino Walker MD;  Location: [facility] OR     CYSTOSCOPY, TRANSURETHRAL RESECTION (TUR) TUMOR BLADDER, COMBINED N/A 2/10/2015    Procedure: COMBINED CYSTOSCOPY, TRANSURETHRAL RESECTION (TUR) TUMOR BLADDER;  Surgeon: Efren Couch MD;  Location: [facility] OR     CYSTOSCOPY, TRANSURETHRAL RESECTION (TUR) TUMOR BLADDER, COMBINED N/A 12/8/2015    Procedure: COMBINED CYSTOSCOPY, TRANSURETHRAL RESECTION (TUR) TUMOR BLADDER;  Surgeon: Efren Couch MD;  Location: [facility] OR     CYSTOSCOPY, URETEROSCOPY, COMBINED Right 5/28/2019    Procedure: Right Ureteroscopy, Fulgaration if Right iUpper Tract Urothelial Carcinoma;  Surgeon: Delfino Walker MD;  Location:  OR       No current outpatient medications on file.       OTC products: None, except as noted above      Allergies   Allergen Reactions     No Known Allergies         Latex Allergy: NO      Social History     Tobacco Use     Smoking status: Smoker, Current Status Unknown     Packs/day: 1.00     Years: 45.00     Pack years: 45.00     Types: Cigarettes     Smokeless tobacco: Never Used     Tobacco comment: wants the quit plan   Substance Use Topics     Alcohol use: Yes     Comment: rare       History   Drug Use No         REVIEW OF SYSTEMS:    CONSTITUTIONAL: NEGATIVE for fever, chills, change in weight  INTEGUMENTARY/SKIN: NEGATIVE for worrisome rashes, moles or lesions  EYES: NEGATIVE for vision changes or irritation  ENT/MOUTH: NEGATIVE for ear, mouth and throat problems  RESP: NEGATIVE for significant cough or SOB  CV: NEGATIVE for chest pain, palpitations or peripheral edema  GI: NEGATIVE for nausea, abdominal pain, heartburn, or change in bowel habits  : NEGATIVE for frequency, dysuria, or hematuria  MUSCULOSKELETAL: NEGATIVE for significant arthralgias or myalgia  NEURO: NEGATIVE for weakness, dizziness or paresthesias  ENDOCRINE: NEGATIVE for temperature intolerance, skin/hair changes  HEME: NEGATIVE for bleeding problems  PSYCHIATRIC: NEGATIVE for changes in mood or affect        EXAM:   /68 (BP Location: Left arm, Patient Position: Sitting, Cuff Size: Adult Regular)   Pulse 76   Temp 98.1  F (36.7  C) (Tympanic)   Wt 57.6 kg (127 lb)   LMP 06/18/2005   SpO2 95%   BMI 23.23 kg/m           GENERAL APPEARANCE: healthy, alert and no distress     EYES: EOMI, PERRL     HENT: ear canals and TM's normal and nose and mouth without ulcers or lesions     NECK: no adenopathy, no asymmetry, masses, or scars and thyroid normal to palpation     RESP: lungs clear to auscultation - no rales, rhonchi or wheezes     CV: regular rates and rhythm, normal S1 S2, no S3 or S4 and no murmur, click or rub     ABDOMEN:  soft, nontender, no HSM or masses and bowel sounds normal     MS: extremities normal- no gross deformities noted, no evidence of inflammation in joints, FROM in all extremities.     SKIN: no suspicious lesions or rashes     NEURO: Normal strength and tone, sensory exam grossly normal, mentation intact and speech normal     PSYCH: mentation appears normal. and affect normal/bright     LYMPHATICS: No cervical adenopathy    DIAGNOSTICS:        EKG attached    Results for orders placed or performed in visit on 07/28/20   CBC with platelets  differential     Status: None   Result Value Ref Range    WBC 7.2 4.0 - 11.0 10e9/L    RBC Count 4.55 3.8 - 5.2 10e12/L    Hemoglobin 14.4 11.7 - 15.7 g/dL    Hematocrit 42.4 35.0 - 47.0 %    MCV 93 78 - 100 fl    MCH 31.6 26.5 - 33.0 pg    MCHC 34.0 31.5 - 36.5 g/dL    RDW 13.0 10.0 - 15.0 %    Platelet Count 227 150 - 450 10e9/L    % Neutrophils 59.5 %    % Lymphocytes 30.3 %    % Monocytes 7.9 %    % Eosinophils 1.9 %    % Basophils 0.4 %    Absolute Neutrophil 4.3 1.6 - 8.3 10e9/L    Absolute Lymphocytes 2.2 0.8 - 5.3 10e9/L    Absolute Monocytes 0.6 0.0 - 1.3 10e9/L    Absolute Eosinophils 0.1 0.0 - 0.7 10e9/L    Absolute Basophils 0.0 0.0 - 0.2 10e9/L    Diff Method Automated Method    Comprehensive metabolic panel     Status: Abnormal   Result Value Ref Range    Sodium 141 133 - 144 mmol/L    Potassium 4.0 3.4 - 5.3 mmol/L    Chloride 109 94 - 109 mmol/L    Carbon Dioxide 24 20 - 32 mmol/L    Anion Gap 8 3 - 14 mmol/L    Glucose 101 (H) 70 - 99 mg/dL    Urea Nitrogen 10 7 - 30 mg/dL    Creatinine 0.68 0.52 - 1.04 mg/dL    GFR Estimate >90 >60 mL/min/[1.73_m2]    GFR Estimate If Black >90 >60 mL/min/[1.73_m2]    Calcium 9.0 8.5 - 10.1 mg/dL    Bilirubin Total 0.6 0.2 - 1.3 mg/dL    Albumin 4.1 3.4 - 5.0 g/dL    Protein Total 7.2 6.8 - 8.8 g/dL    Alkaline Phosphatase 118 40 - 150 U/L    ALT 24 0 - 50 U/L    AST 13 0 - 45 U/L   *UA reflex to Microscopic and Culture - [facility]     Status: None    Specimen: Midstream Urine   Result Value Ref Range    Color Urine Yellow     Appearance Urine Clear     Glucose Urine Negative NEG^Negative mg/dL    Bilirubin Urine Negative NEG^Negative    Ketones Urine Negative NEG^Negative mg/dL    Specific Gravity Urine 1.015 1.003 - 1.035    Blood Urine Negative NEG^Negative    pH Urine 5.5 5.0 - 7.0 pH    Protein Albumin Urine Negative NEG^Negative mg/dL    Urobilinogen Urine 0.2 0.2 - 1.0 EU/dL    Nitrite Urine Negative NEG^Negative    Leukocyte Esterase Urine Negative  NEG^Negative    Source Midstream Urine          COVID test pre-ordered by surgeon      Recent Labs   Lab Test 05/22/19  1308 05/01/19  1116  01/23/15  1533   HGB 13.5 14.7   < > 13.1    224   < > 237   INR  --   --   --  1.0    141   < >  --    POTASSIUM 4.1 4.2   < >  --    CR 0.74 0.70   < >  --     < > = values in this interval not displayed.        IMPRESSION:     Reason for surgery/procedure:   Bladder Tumor, history of bladder cancer, urothelial carcinoma of right kidney      The proposed surgical procedure is considered LOW risk.      REVISED CARDIAC RISK INDEX  The patient has the following serious cardiovascular risks for perioperative complications such as (MI, PE, VFib and 3  AV Block):  No serious cardiac risks  INTERPRETATION: 0 risks: Class I (very low risk - 0.4% complication rate)      The patient has the following additional risks for perioperative complications:  No identified additional risks        ICD-10-CM    1. Preop general physical exam  Z01.818 CBC with platelets differential     Comprehensive metabolic panel     *UA reflex to Microscopic and Culture - [facility]     EKG 12-lead complete w/read - (Clinic Performed)   2. Bladder tumor  D49.4    3. Malignant neoplasm of overlapping sites of bladder (H)  C67.8    4. History of bladder cancer  Z85.51    5. Urothelial carcinoma of kidney, right (H)  C64.1    6. Encounter for screening mammogram for breast cancer  Z12.31 MA SCREENING DIGITAL BILATERAL ([place])       RECOMMENDATIONS:       APPROVAL GIVEN to proceed with proposed procedure, without further diagnostic evaluation       Signed Electronically by: Itzel Phillip CNP    Copy of this evaluation report is provided to requesting physician.    [facility] Preop Guidelines    Revised Cardiac Risk Index

## 2020-07-24 NOTE — PROGRESS NOTES
[facility]  [address]  [place] [address]  [phone]  Dept: [phone]    PRE-OP EVALUATION:  Today's date: 2020      Kait Augustin (: 1960) presents for pre-operative evaluation assessment as requested by Dr. Walker.  She requires evaluation and anesthesia risk assessment prior to undergoing surgery/procedure for treatment of Bladder Tumor, history of bladder cancer, urothelial carcinoma of right kidney      Proposed Surgery/ Procedure: Transurethral resection of bladder tumor, diagnostic ureteroscopy, ablation of urothelial tumor, possible fulguration through flexible ureteroscope, bilateral retrograde pyelogram.   Date of Surgery/ Procedure: 2020  Time of Surgery/ Procedure: [facility]  Hospital/Surgical Facility: [facility]  Surgery Fax Number: Note does not need to be faxed, will be available electronically in Epic.  Primary Physician: Itzel Phillip  Type of Anesthesia Anticipated: to be determined      Preoperative Questionnaire:   No - Have you ever had a heart attack or stroke?  No - Have you ever had surgery on your heart or blood vessels, such as a stent, coronary (heart) bypass, or surgery on an artery in the head, neck, heart, or legs?  No - Do you have chest pain when you are physically active?  No - Do you have a history of heart failure?  No - Do you currently have a cold, bronchitis, or symptoms of other respiratory (head and chest) infections?  No - Do you have a cough, shortness of breath, or wheezing?  No - Do you or anyone in your family have a history of blood clots?  No - Do you or anyone in your family have a serious bleeding problem, such as long-lasting bleeding after surgeries or cuts?  No - Have you ever had anemia or been told to take iron pills?  No - Have you had any abnormal blood loss such as black, tarry or bloody stools, or abnormal vaginal bleeding?  No - Have you ever had a blood transfusion?  Yes - Are you  willing to have a blood transfusion if it is medically needed before, during, or after your surgery?  No - Have you or anyone in your family ever had problems with anesthesia (sedation for surgery)?  No - Do you have sleep apnea, excessive snoring, or daytime drowsiness?   No - Do you have any artifical heart valves or other implanted medical devices, such as a pacemaker, defibrillator, or continuous glucose monitor?  No - Do you have any artifical joints?  No - Are you allergic to latex?  No - Is there any chance that you may be pregnant?      Patient has a Health Care Directive or Living Will:  NO    HPI:     HPI related to upcoming procedure:   Bladder Tumor, history of bladder cancer, urothelial carcinoma of right kidney        See problem list for active medical problems.  Problems all longstanding and stable, except as noted/documented.  See ROS for pertinent symptoms related to these conditions.      MEDICAL HISTORY:     Patient Active Problem List    Diagnosis Date Noted     Bladder tumor 07/23/2020     Priority: Medium     Added automatically from request for surgery [id number]       Urothelial carcinoma of kidney, right (H) 06/03/2019     Priority: Medium     History of bladder cancer 06/03/2019     Priority: Medium     ACP (advance care planning) 12/13/2016     Priority: Medium     Advance Care Planning 12/13/2016: ACP Review of Chart / Resources Provided:  Reviewed chart for advance care plan.  Kait Augustin has no plan or code status on file. Discussed available resources and provided with information. Confirmed code status reflects current choices pending further ACP discussions.  Confirmed/documented legally designated decision makers.  Added by Radha Parekh             Malignant neoplasm of overlapping sites of bladder (H) 12/22/2015     Priority: Medium     H/O renal calculi 11/17/2014     Priority: Medium          Past Medical History:   Diagnosis Date     Cancer (H)     Bladder Cancer      H/O renal calculi 11/17/2014     MIGRAINE HEADACHES 1996     Nicotine dependence 11/17/2014     PONV (postoperative nausea and vomiting)      Tobacco abuse 11/17/2014         Past Surgical History:   Procedure Laterality Date     C LIGATE FALLOPIAN TUBE  1996     CYSTOSCOPY, RETROGRADES, INSERT STENT URETER(S), COMBINED Right 12/8/2015    Procedure: COMBINED CYSTOSCOPY, RETROGRADES, INSERT STENT URETER(S);  Surgeon: Efren Couch MD;  Location: [facility] OR     CYSTOSCOPY, TRANSURETHRAL RESECTION (TUR) PROSTATE, COMBINED Bilateral 5/7/2019    Procedure: Transurethral Resection of Bladder Tumor, BILATERAL RETROGRADE PYELOGRAMS, RIGHT URETEROSCOPY WITH HOLMIUM LASER TUMOR ABLATION AND STENT PLACEMENT;  Surgeon: Delfino Walker MD;  Location: [facility] OR     CYSTOSCOPY, TRANSURETHRAL RESECTION (TUR) TUMOR BLADDER, COMBINED N/A 2/10/2015    Procedure: COMBINED CYSTOSCOPY, TRANSURETHRAL RESECTION (TUR) TUMOR BLADDER;  Surgeon: Efren Couch MD;  Location: [facility] OR     CYSTOSCOPY, TRANSURETHRAL RESECTION (TUR) TUMOR BLADDER, COMBINED N/A 12/8/2015    Procedure: COMBINED CYSTOSCOPY, TRANSURETHRAL RESECTION (TUR) TUMOR BLADDER;  Surgeon: Efren Couch MD;  Location: [facility] OR     CYSTOSCOPY, URETEROSCOPY, COMBINED Right 5/28/2019    Procedure: Right Ureteroscopy, Fulgaration if Right iUpper Tract Urothelial Carcinoma;  Surgeon: Delfino Walker MD;  Location:  OR       No current outpatient medications on file.       OTC products: None, except as noted above      Allergies   Allergen Reactions     No Known Allergies         Latex Allergy: NO      Social History     Tobacco Use     Smoking status: Smoker, Current Status Unknown     Packs/day: 1.00     Years: 45.00     Pack years: 45.00     Types: Cigarettes     Smokeless tobacco: Never Used     Tobacco comment: wants the quit plan   Substance Use Topics     Alcohol use: Yes     Comment: rare       History   Drug Use No         REVIEW OF SYSTEMS:    CONSTITUTIONAL: NEGATIVE for fever, chills, change in weight  INTEGUMENTARY/SKIN: NEGATIVE for worrisome rashes, moles or lesions  EYES: NEGATIVE for vision changes or irritation  ENT/MOUTH: NEGATIVE for ear, mouth and throat problems  RESP: NEGATIVE for significant cough or SOB  CV: NEGATIVE for chest pain, palpitations or peripheral edema  GI: NEGATIVE for nausea, abdominal pain, heartburn, or change in bowel habits  : NEGATIVE for frequency, dysuria, or hematuria  MUSCULOSKELETAL: NEGATIVE for significant arthralgias or myalgia  NEURO: NEGATIVE for weakness, dizziness or paresthesias  ENDOCRINE: NEGATIVE for temperature intolerance, skin/hair changes  HEME: NEGATIVE for bleeding problems  PSYCHIATRIC: NEGATIVE for changes in mood or affect        EXAM:   /68 (BP Location: Left arm, Patient Position: Sitting, Cuff Size: Adult Regular)   Pulse 76   Temp 98.1  F (36.7  C) (Tympanic)   Wt 57.6 kg (127 lb)   LMP 06/18/2005   SpO2 95%   BMI 23.23 kg/m           GENERAL APPEARANCE: healthy, alert and no distress     EYES: EOMI, PERRL     HENT: ear canals and TM's normal and nose and mouth without ulcers or lesions     NECK: no adenopathy, no asymmetry, masses, or scars and thyroid normal to palpation     RESP: lungs clear to auscultation - no rales, rhonchi or wheezes     CV: regular rates and rhythm, normal S1 S2, no S3 or S4 and no murmur, click or rub     ABDOMEN:  soft, nontender, no HSM or masses and bowel sounds normal     MS: extremities normal- no gross deformities noted, no evidence of inflammation in joints, FROM in all extremities.     SKIN: no suspicious lesions or rashes     NEURO: Normal strength and tone, sensory exam grossly normal, mentation intact and speech normal     PSYCH: mentation appears normal. and affect normal/bright     LYMPHATICS: No cervical adenopathy    DIAGNOSTICS:        EKG attached    Results for orders placed or performed in visit on 07/28/20   CBC with platelets  differential     Status: None   Result Value Ref Range    WBC 7.2 4.0 - 11.0 10e9/L    RBC Count 4.55 3.8 - 5.2 10e12/L    Hemoglobin 14.4 11.7 - 15.7 g/dL    Hematocrit 42.4 35.0 - 47.0 %    MCV 93 78 - 100 fl    MCH 31.6 26.5 - 33.0 pg    MCHC 34.0 31.5 - 36.5 g/dL    RDW 13.0 10.0 - 15.0 %    Platelet Count 227 150 - 450 10e9/L    % Neutrophils 59.5 %    % Lymphocytes 30.3 %    % Monocytes 7.9 %    % Eosinophils 1.9 %    % Basophils 0.4 %    Absolute Neutrophil 4.3 1.6 - 8.3 10e9/L    Absolute Lymphocytes 2.2 0.8 - 5.3 10e9/L    Absolute Monocytes 0.6 0.0 - 1.3 10e9/L    Absolute Eosinophils 0.1 0.0 - 0.7 10e9/L    Absolute Basophils 0.0 0.0 - 0.2 10e9/L    Diff Method Automated Method    Comprehensive metabolic panel     Status: Abnormal   Result Value Ref Range    Sodium 141 133 - 144 mmol/L    Potassium 4.0 3.4 - 5.3 mmol/L    Chloride 109 94 - 109 mmol/L    Carbon Dioxide 24 20 - 32 mmol/L    Anion Gap 8 3 - 14 mmol/L    Glucose 101 (H) 70 - 99 mg/dL    Urea Nitrogen 10 7 - 30 mg/dL    Creatinine 0.68 0.52 - 1.04 mg/dL    GFR Estimate >90 >60 mL/min/[1.73_m2]    GFR Estimate If Black >90 >60 mL/min/[1.73_m2]    Calcium 9.0 8.5 - 10.1 mg/dL    Bilirubin Total 0.6 0.2 - 1.3 mg/dL    Albumin 4.1 3.4 - 5.0 g/dL    Protein Total 7.2 6.8 - 8.8 g/dL    Alkaline Phosphatase 118 40 - 150 U/L    ALT 24 0 - 50 U/L    AST 13 0 - 45 U/L   *UA reflex to Microscopic and Culture - [facility]     Status: None    Specimen: Midstream Urine   Result Value Ref Range    Color Urine Yellow     Appearance Urine Clear     Glucose Urine Negative NEG^Negative mg/dL    Bilirubin Urine Negative NEG^Negative    Ketones Urine Negative NEG^Negative mg/dL    Specific Gravity Urine 1.015 1.003 - 1.035    Blood Urine Negative NEG^Negative    pH Urine 5.5 5.0 - 7.0 pH    Protein Albumin Urine Negative NEG^Negative mg/dL    Urobilinogen Urine 0.2 0.2 - 1.0 EU/dL    Nitrite Urine Negative NEG^Negative    Leukocyte Esterase Urine Negative  NEG^Negative    Source Midstream Urine          COVID test pre-ordered by surgeon      Recent Labs   Lab Test 05/22/19  1308 05/01/19  1116  01/23/15  1533   HGB 13.5 14.7   < > 13.1    224   < > 237   INR  --   --   --  1.0    141   < >  --    POTASSIUM 4.1 4.2   < >  --    CR 0.74 0.70   < >  --     < > = values in this interval not displayed.        IMPRESSION:     Reason for surgery/procedure:   Bladder Tumor, history of bladder cancer, urothelial carcinoma of right kidney      The proposed surgical procedure is considered LOW risk.      REVISED CARDIAC RISK INDEX  The patient has the following serious cardiovascular risks for perioperative complications such as (MI, PE, VFib and 3  AV Block):  No serious cardiac risks  INTERPRETATION: 0 risks: Class I (very low risk - 0.4% complication rate)      The patient has the following additional risks for perioperative complications:  No identified additional risks        ICD-10-CM    1. Preop general physical exam  Z01.818 CBC with platelets differential     Comprehensive metabolic panel     *UA reflex to Microscopic and Culture - [facility]     EKG 12-lead complete w/read - (Clinic Performed)   2. Bladder tumor  D49.4    3. Malignant neoplasm of overlapping sites of bladder (H)  C67.8    4. History of bladder cancer  Z85.51    5. Urothelial carcinoma of kidney, right (H)  C64.1    6. Encounter for screening mammogram for breast cancer  Z12.31 MA SCREENING DIGITAL BILATERAL ([place])       RECOMMENDATIONS:       APPROVAL GIVEN to proceed with proposed procedure, without further diagnostic evaluation       Signed Electronically by: Itzel Phillip CNP    Copy of this evaluation report is provided to requesting physician.    [facility] Preop Guidelines    Revised Cardiac Risk Index

## 2020-07-28 ENCOUNTER — OFFICE VISIT (OUTPATIENT)
Dept: FAMILY MEDICINE | Facility: OTHER | Age: 60
End: 2020-07-28
Attending: NURSE PRACTITIONER
Payer: COMMERCIAL

## 2020-07-28 VITALS
OXYGEN SATURATION: 95 % | DIASTOLIC BLOOD PRESSURE: 68 MMHG | HEART RATE: 76 BPM | TEMPERATURE: 98.1 F | WEIGHT: 127 LBS | SYSTOLIC BLOOD PRESSURE: 114 MMHG | BODY MASS INDEX: 23.23 KG/M2

## 2020-07-28 DIAGNOSIS — C67.8 MALIGNANT NEOPLASM OF OVERLAPPING SITES OF BLADDER (H): ICD-10-CM

## 2020-07-28 DIAGNOSIS — Z85.51 HISTORY OF BLADDER CANCER: ICD-10-CM

## 2020-07-28 DIAGNOSIS — C64.1 UROTHELIAL CARCINOMA OF KIDNEY, RIGHT (H): ICD-10-CM

## 2020-07-28 DIAGNOSIS — Z01.818 PREOP GENERAL PHYSICAL EXAM: Primary | ICD-10-CM

## 2020-07-28 DIAGNOSIS — D49.4 BLADDER TUMOR: ICD-10-CM

## 2020-07-28 DIAGNOSIS — L71.9 ROSACEA: ICD-10-CM

## 2020-07-28 DIAGNOSIS — Z12.31 ENCOUNTER FOR SCREENING MAMMOGRAM FOR BREAST CANCER: ICD-10-CM

## 2020-07-28 LAB
ALBUMIN SERPL-MCNC: 4.1 G/DL (ref 3.4–5)
ALBUMIN UR-MCNC: NEGATIVE MG/DL
ALP SERPL-CCNC: 118 U/L (ref 40–150)
ALT SERPL W P-5'-P-CCNC: 24 U/L (ref 0–50)
ANION GAP SERPL CALCULATED.3IONS-SCNC: 8 MMOL/L (ref 3–14)
APPEARANCE UR: CLEAR
AST SERPL W P-5'-P-CCNC: 13 U/L (ref 0–45)
BASOPHILS # BLD AUTO: 0 10E9/L (ref 0–0.2)
BASOPHILS NFR BLD AUTO: 0.4 %
BILIRUB SERPL-MCNC: 0.6 MG/DL (ref 0.2–1.3)
BILIRUB UR QL STRIP: NEGATIVE
BUN SERPL-MCNC: 10 MG/DL (ref 7–30)
CALCIUM SERPL-MCNC: 9 MG/DL (ref 8.5–10.1)
CHLORIDE SERPL-SCNC: 109 MMOL/L (ref 94–109)
CO2 SERPL-SCNC: 24 MMOL/L (ref 20–32)
COLOR UR AUTO: YELLOW
CREAT SERPL-MCNC: 0.68 MG/DL (ref 0.52–1.04)
DIFFERENTIAL METHOD BLD: NORMAL
EOSINOPHIL # BLD AUTO: 0.1 10E9/L (ref 0–0.7)
EOSINOPHIL NFR BLD AUTO: 1.9 %
ERYTHROCYTE [DISTWIDTH] IN BLOOD BY AUTOMATED COUNT: 13 % (ref 10–15)
GFR SERPL CREATININE-BSD FRML MDRD: >90 ML/MIN/{1.73_M2}
GLUCOSE SERPL-MCNC: 101 MG/DL (ref 70–99)
GLUCOSE UR STRIP-MCNC: NEGATIVE MG/DL
HCT VFR BLD AUTO: 42.4 % (ref 35–47)
HGB BLD-MCNC: 14.4 G/DL (ref 11.7–15.7)
HGB UR QL STRIP: NEGATIVE
KETONES UR STRIP-MCNC: NEGATIVE MG/DL
LEUKOCYTE ESTERASE UR QL STRIP: NEGATIVE
LYMPHOCYTES # BLD AUTO: 2.2 10E9/L (ref 0.8–5.3)
LYMPHOCYTES NFR BLD AUTO: 30.3 %
MCH RBC QN AUTO: 31.6 PG (ref 26.5–33)
MCHC RBC AUTO-ENTMCNC: 34 G/DL (ref 31.5–36.5)
MCV RBC AUTO: 93 FL (ref 78–100)
MONOCYTES # BLD AUTO: 0.6 10E9/L (ref 0–1.3)
MONOCYTES NFR BLD AUTO: 7.9 %
NEUTROPHILS # BLD AUTO: 4.3 10E9/L (ref 1.6–8.3)
NEUTROPHILS NFR BLD AUTO: 59.5 %
NITRATE UR QL: NEGATIVE
PH UR STRIP: 5.5 PH (ref 5–7)
PLATELET # BLD AUTO: 227 10E9/L (ref 150–450)
POTASSIUM SERPL-SCNC: 4 MMOL/L (ref 3.4–5.3)
PROT SERPL-MCNC: 7.2 G/DL (ref 6.8–8.8)
RBC # BLD AUTO: 4.55 10E12/L (ref 3.8–5.2)
SODIUM SERPL-SCNC: 141 MMOL/L (ref 133–144)
SOURCE: NORMAL
SP GR UR STRIP: 1.01 (ref 1–1.03)
UROBILINOGEN UR STRIP-ACNC: 0.2 EU/DL (ref 0.2–1)
WBC # BLD AUTO: 7.2 10E9/L (ref 4–11)

## 2020-07-28 PROCEDURE — 85025 COMPLETE CBC W/AUTO DIFF WBC: CPT | Performed by: NURSE PRACTITIONER

## 2020-07-28 PROCEDURE — 93000 ELECTROCARDIOGRAM COMPLETE: CPT | Performed by: INTERNAL MEDICINE

## 2020-07-28 PROCEDURE — 36415 COLL VENOUS BLD VENIPUNCTURE: CPT | Performed by: NURSE PRACTITIONER

## 2020-07-28 PROCEDURE — 80053 COMPREHEN METABOLIC PANEL: CPT | Performed by: NURSE PRACTITIONER

## 2020-07-28 PROCEDURE — 81003 URINALYSIS AUTO W/O SCOPE: CPT | Performed by: NURSE PRACTITIONER

## 2020-07-28 PROCEDURE — 99214 OFFICE O/P EST MOD 30 MIN: CPT | Mod: 25 | Performed by: NURSE PRACTITIONER

## 2020-07-28 RX ORDER — METRONIDAZOLE 10 MG/G
GEL TOPICAL DAILY
Qty: 60 G | Refills: 1 | Status: SHIPPED | OUTPATIENT
Start: 2020-07-28 | End: 2021-10-26

## 2020-07-28 ASSESSMENT — PAIN SCALES - GENERAL: PAINLEVEL: NO PAIN (0)

## 2020-07-28 NOTE — NURSING NOTE
"Chief Complaint   Patient presents with     Pre-Op Exam       Initial /68 (BP Location: Left arm, Patient Position: Sitting, Cuff Size: Adult Regular)   Pulse 76   Temp 98.1  F (36.7  C) (Tympanic)   Wt 57.6 kg (127 lb)   LMP 06/18/2005   SpO2 95%   BMI 23.23 kg/m   Estimated body mass index is 23.23 kg/m  as calculated from the following:    Height as of 6/9/20: 1.575 m (5' 2\").    Weight as of this encounter: 57.6 kg (127 lb).  Medication Reconciliation: complete  Ashley A. Lechevalier, LPN  "

## 2020-07-29 NOTE — PROGRESS NOTES
Message to Dr. Walker regarding eval complete and inquiry on where to fax EKG graphing for his review. 7/29 Omar

## 2020-08-01 ENCOUNTER — OFFICE VISIT (OUTPATIENT)
Dept: FAMILY MEDICINE | Facility: OTHER | Age: 60
End: 2020-08-01
Attending: NURSE PRACTITIONER
Payer: COMMERCIAL

## 2020-08-01 DIAGNOSIS — Z85.51 HISTORY OF BLADDER CANCER: ICD-10-CM

## 2020-08-01 DIAGNOSIS — D49.4 BLADDER TUMOR: ICD-10-CM

## 2020-08-01 PROCEDURE — U0003 INFECTIOUS AGENT DETECTION BY NUCLEIC ACID (DNA OR RNA); SEVERE ACUTE RESPIRATORY SYNDROME CORONAVIRUS 2 (SARS-COV-2) (CORONAVIRUS DISEASE [COVID-19]), AMPLIFIED PROBE TECHNIQUE, MAKING USE OF HIGH THROUGHPUT TECHNOLOGIES AS DESCRIBED BY CMS-2020-01-R: HCPCS | Performed by: UROLOGY

## 2020-08-02 LAB
LABORATORY COMMENT REPORT: NORMAL
SARS-COV-2 RNA SPEC QL NAA+PROBE: NEGATIVE
SARS-COV-2 RNA SPEC QL NAA+PROBE: NORMAL
SPECIMEN SOURCE: NORMAL
SPECIMEN SOURCE: NORMAL

## 2020-08-03 ENCOUNTER — ANESTHESIA EVENT (OUTPATIENT)
Dept: SURGERY | Facility: OTHER | Age: 60
End: 2020-08-03
Payer: COMMERCIAL

## 2020-08-04 ENCOUNTER — ANESTHESIA (OUTPATIENT)
Dept: SURGERY | Facility: OTHER | Age: 60
End: 2020-08-04
Payer: COMMERCIAL

## 2020-08-04 ENCOUNTER — HOSPITAL ENCOUNTER (OUTPATIENT)
Dept: GENERAL RADIOLOGY | Facility: OTHER | Age: 60
End: 2020-08-04
Attending: UROLOGY | Admitting: UROLOGY
Payer: COMMERCIAL

## 2020-08-04 ENCOUNTER — HOSPITAL ENCOUNTER (OUTPATIENT)
Facility: OTHER | Age: 60
Discharge: HOME | End: 2020-08-04
Attending: UROLOGY | Admitting: UROLOGY
Payer: COMMERCIAL

## 2020-08-04 VITALS
DIASTOLIC BLOOD PRESSURE: 76 MMHG | HEART RATE: 63 BPM | WEIGHT: 127 LBS | RESPIRATION RATE: 16 BRPM | OXYGEN SATURATION: 95 % | BODY MASS INDEX: 23.37 KG/M2 | SYSTOLIC BLOOD PRESSURE: 127 MMHG | TEMPERATURE: 96.8 F | HEIGHT: 62 IN

## 2020-08-04 DIAGNOSIS — D49.4 BLADDER TUMOR: ICD-10-CM

## 2020-08-04 DIAGNOSIS — C64.1 UROTHELIAL CARCINOMA OF KIDNEY, RIGHT (H): ICD-10-CM

## 2020-08-04 DIAGNOSIS — Z85.51 HISTORY OF BLADDER CANCER: ICD-10-CM

## 2020-08-04 PROCEDURE — 52235 CYSTOSCOPY AND TREATMENT: CPT | Performed by: NURSE ANESTHETIST, CERTIFIED REGISTERED

## 2020-08-04 PROCEDURE — C1894 INTRO/SHEATH, NON-LASER: HCPCS | Performed by: UROLOGY

## 2020-08-04 PROCEDURE — 25500064 ZZH RX 255 OP 636: Performed by: UROLOGY

## 2020-08-04 PROCEDURE — C1758 CATHETER, URETERAL: HCPCS | Performed by: UROLOGY

## 2020-08-04 PROCEDURE — 25000128 H RX IP 250 OP 636: Performed by: UROLOGY

## 2020-08-04 PROCEDURE — 25000128 H RX IP 250 OP 636: Performed by: NURSE ANESTHETIST, CERTIFIED REGISTERED

## 2020-08-04 PROCEDURE — 88307 TISSUE EXAM BY PATHOLOGIST: CPT

## 2020-08-04 PROCEDURE — C1769 GUIDE WIRE: HCPCS | Performed by: UROLOGY

## 2020-08-04 PROCEDURE — 71000027 ZZH RECOVERY PHASE 2 EACH 15 MINS: Performed by: UROLOGY

## 2020-08-04 PROCEDURE — 25800030 ZZH RX IP 258 OP 636: Performed by: UROLOGY

## 2020-08-04 PROCEDURE — 51720 TREATMENT OF BLADDER LESION: CPT | Mod: XU | Performed by: UROLOGY

## 2020-08-04 PROCEDURE — 74420 UROGRAPHY RTRGR +-KUB: CPT | Mod: 26 | Performed by: UROLOGY

## 2020-08-04 PROCEDURE — 36000060 ZZH SURGERY LEVEL 3 W FLUORO 1ST 30 MIN: Performed by: UROLOGY

## 2020-08-04 PROCEDURE — 71000014 ZZH RECOVERY PHASE 1 LEVEL 2 FIRST HR: Performed by: UROLOGY

## 2020-08-04 PROCEDURE — 25000125 ZZHC RX 250: Performed by: NURSE ANESTHETIST, CERTIFIED REGISTERED

## 2020-08-04 PROCEDURE — 27210794 ZZH OR GENERAL SUPPLY STERILE: Performed by: UROLOGY

## 2020-08-04 PROCEDURE — 25000125 ZZHC RX 250: Performed by: UROLOGY

## 2020-08-04 PROCEDURE — 25800030 ZZH RX IP 258 OP 636: Performed by: NURSE ANESTHETIST, CERTIFIED REGISTERED

## 2020-08-04 PROCEDURE — C2617 STENT, NON-COR, TEM W/O DEL: HCPCS | Performed by: UROLOGY

## 2020-08-04 PROCEDURE — 27211024 ZZHC OR SUPPLY OTHER OPNP: Performed by: UROLOGY

## 2020-08-04 PROCEDURE — 52354 CYSTOURETERO W/BIOPSY: CPT | Performed by: UROLOGY

## 2020-08-04 PROCEDURE — 52332 CYSTOSCOPY AND TREATMENT: CPT | Mod: XU | Performed by: UROLOGY

## 2020-08-04 PROCEDURE — 40000278 XR SURGERY CARM FLUORO LESS THAN 5 MIN

## 2020-08-04 PROCEDURE — 36000058 ZZH SURGERY LEVEL 3 EA 15 ADDTL MIN: Performed by: UROLOGY

## 2020-08-04 PROCEDURE — 37000008 ZZH ANESTHESIA TECHNICAL FEE, 1ST 30 MIN: Performed by: UROLOGY

## 2020-08-04 PROCEDURE — 40000306 ZZH STATISTIC PRE PROC ASSESS II: Performed by: UROLOGY

## 2020-08-04 PROCEDURE — 25000132 ZZH RX MED GY IP 250 OP 250 PS 637: Performed by: UROLOGY

## 2020-08-04 PROCEDURE — 37000009 ZZH ANESTHESIA TECHNICAL FEE, EACH ADDTL 15 MIN: Performed by: UROLOGY

## 2020-08-04 PROCEDURE — 52235 CYSTOSCOPY AND TREATMENT: CPT | Mod: XU | Performed by: UROLOGY

## 2020-08-04 DEVICE — STENT URETERAL CONTOUR SOFT PERCUFLEX 6FRX22CM: Type: IMPLANTABLE DEVICE | Site: URETER | Status: FUNCTIONAL

## 2020-08-04 RX ORDER — SODIUM CHLORIDE 9 MG/ML
INJECTION, SOLUTION INTRAVENOUS CONTINUOUS
Status: DISCONTINUED | OUTPATIENT
Start: 2020-08-04 | End: 2020-08-04 | Stop reason: HOSPADM

## 2020-08-04 RX ORDER — KETOROLAC TROMETHAMINE 30 MG/ML
INJECTION, SOLUTION INTRAMUSCULAR; INTRAVENOUS PRN
Status: DISCONTINUED | OUTPATIENT
Start: 2020-08-04 | End: 2020-08-04

## 2020-08-04 RX ORDER — ONDANSETRON 4 MG/1
4 TABLET, ORALLY DISINTEGRATING ORAL EVERY 30 MIN PRN
Status: DISCONTINUED | OUTPATIENT
Start: 2020-08-04 | End: 2020-08-04 | Stop reason: HOSPADM

## 2020-08-04 RX ORDER — PROPOFOL 10 MG/ML
INJECTION, EMULSION INTRAVENOUS CONTINUOUS PRN
Status: DISCONTINUED | OUTPATIENT
Start: 2020-08-04 | End: 2020-08-04

## 2020-08-04 RX ORDER — PROPOFOL 10 MG/ML
INJECTION, EMULSION INTRAVENOUS PRN
Status: DISCONTINUED | OUTPATIENT
Start: 2020-08-04 | End: 2020-08-04

## 2020-08-04 RX ORDER — FENTANYL CITRATE 50 UG/ML
25-50 INJECTION, SOLUTION INTRAMUSCULAR; INTRAVENOUS
Status: DISCONTINUED | OUTPATIENT
Start: 2020-08-04 | End: 2020-08-04 | Stop reason: HOSPADM

## 2020-08-04 RX ORDER — HYDROMORPHONE HYDROCHLORIDE 1 MG/ML
.3-.5 INJECTION, SOLUTION INTRAMUSCULAR; INTRAVENOUS; SUBCUTANEOUS EVERY 10 MIN PRN
Status: DISCONTINUED | OUTPATIENT
Start: 2020-08-04 | End: 2020-08-04 | Stop reason: HOSPADM

## 2020-08-04 RX ORDER — DIPHENHYDRAMINE HYDROCHLORIDE 50 MG/ML
12.5 INJECTION INTRAMUSCULAR; INTRAVENOUS ONCE
Status: COMPLETED | OUTPATIENT
Start: 2020-08-04 | End: 2020-08-04

## 2020-08-04 RX ORDER — NALOXONE HYDROCHLORIDE 0.4 MG/ML
.1-.4 INJECTION, SOLUTION INTRAMUSCULAR; INTRAVENOUS; SUBCUTANEOUS
Status: DISCONTINUED | OUTPATIENT
Start: 2020-08-04 | End: 2020-08-04 | Stop reason: HOSPADM

## 2020-08-04 RX ORDER — CEFTRIAXONE SODIUM 1 G/50ML
1 INJECTION, SOLUTION INTRAVENOUS
Status: COMPLETED | OUTPATIENT
Start: 2020-08-04 | End: 2020-08-04

## 2020-08-04 RX ORDER — MEPERIDINE HYDROCHLORIDE 50 MG/ML
12.5 INJECTION INTRAMUSCULAR; INTRAVENOUS; SUBCUTANEOUS
Status: DISCONTINUED | OUTPATIENT
Start: 2020-08-04 | End: 2020-08-04 | Stop reason: HOSPADM

## 2020-08-04 RX ORDER — ONDANSETRON 2 MG/ML
4 INJECTION INTRAMUSCULAR; INTRAVENOUS ONCE
Status: COMPLETED | OUTPATIENT
Start: 2020-08-04 | End: 2020-08-04

## 2020-08-04 RX ORDER — ATROPA BELLADONNA AND OPIUM 16.2; 3 MG/1; MG/1
SUPPOSITORY RECTAL PRN
Status: DISCONTINUED | OUTPATIENT
Start: 2020-08-04 | End: 2020-08-04 | Stop reason: HOSPADM

## 2020-08-04 RX ORDER — HYDROCODONE BITARTRATE AND ACETAMINOPHEN 5; 325 MG/1; MG/1
1-2 TABLET ORAL EVERY 4 HOURS PRN
Status: DISCONTINUED | OUTPATIENT
Start: 2020-08-04 | End: 2020-08-04 | Stop reason: HOSPADM

## 2020-08-04 RX ORDER — FENTANYL CITRATE 50 UG/ML
INJECTION, SOLUTION INTRAMUSCULAR; INTRAVENOUS PRN
Status: DISCONTINUED | OUTPATIENT
Start: 2020-08-04 | End: 2020-08-04

## 2020-08-04 RX ORDER — DEXAMETHASONE SODIUM PHOSPHATE 4 MG/ML
INJECTION, SOLUTION INTRA-ARTICULAR; INTRALESIONAL; INTRAMUSCULAR; INTRAVENOUS; SOFT TISSUE PRN
Status: DISCONTINUED | OUTPATIENT
Start: 2020-08-04 | End: 2020-08-04

## 2020-08-04 RX ORDER — SCOLOPAMINE TRANSDERMAL SYSTEM 1 MG/1
1 PATCH, EXTENDED RELEASE TRANSDERMAL
Status: DISCONTINUED | OUTPATIENT
Start: 2020-08-04 | End: 2020-08-04 | Stop reason: HOSPADM

## 2020-08-04 RX ORDER — HYDROCODONE BITARTRATE AND ACETAMINOPHEN 5; 325 MG/1; MG/1
1-2 TABLET ORAL EVERY 4 HOURS PRN
Qty: 20 TABLET | Refills: 0 | Status: SHIPPED | OUTPATIENT
Start: 2020-08-04 | End: 2020-08-19

## 2020-08-04 RX ORDER — LIDOCAINE 40 MG/G
CREAM TOPICAL
Status: DISCONTINUED | OUTPATIENT
Start: 2020-08-04 | End: 2020-08-04 | Stop reason: HOSPADM

## 2020-08-04 RX ORDER — KETAMINE HYDROCHLORIDE 10 MG/ML
INJECTION INTRAMUSCULAR; INTRAVENOUS PRN
Status: DISCONTINUED | OUTPATIENT
Start: 2020-08-04 | End: 2020-08-04

## 2020-08-04 RX ORDER — LIDOCAINE HYDROCHLORIDE 20 MG/ML
INJECTION, SOLUTION INFILTRATION; PERINEURAL PRN
Status: DISCONTINUED | OUTPATIENT
Start: 2020-08-04 | End: 2020-08-04

## 2020-08-04 RX ORDER — ONDANSETRON 2 MG/ML
4 INJECTION INTRAMUSCULAR; INTRAVENOUS EVERY 30 MIN PRN
Status: DISCONTINUED | OUTPATIENT
Start: 2020-08-04 | End: 2020-08-04 | Stop reason: HOSPADM

## 2020-08-04 RX ADMIN — PROPOFOL 150 MG: 10 INJECTION, EMULSION INTRAVENOUS at 09:42

## 2020-08-04 RX ADMIN — ONDANSETRON 4 MG: 2 INJECTION INTRAMUSCULAR; INTRAVENOUS at 08:52

## 2020-08-04 RX ADMIN — LIDOCAINE HYDROCHLORIDE 60 MG: 20 INJECTION, SOLUTION INFILTRATION; PERINEURAL at 09:42

## 2020-08-04 RX ADMIN — PHENYLEPHRINE HYDROCHLORIDE 100 MCG: 10 INJECTION INTRAVENOUS at 09:53

## 2020-08-04 RX ADMIN — PHENYLEPHRINE HYDROCHLORIDE 100 MCG: 10 INJECTION INTRAVENOUS at 10:14

## 2020-08-04 RX ADMIN — SODIUM CHLORIDE 15 MCG: 900 INJECTION, SOLUTION INTRAVENOUS at 09:46

## 2020-08-04 RX ADMIN — HYDROCODONE BITARTRATE AND ACETAMINOPHEN 1 TABLET: 5; 325 TABLET ORAL at 13:24

## 2020-08-04 RX ADMIN — GEMCITABINE HYDROCHLORIDE 2000 MG: 2 INJECTION, SOLUTION INTRAVENOUS at 12:12

## 2020-08-04 RX ADMIN — MIDAZOLAM 2 MG: 1 INJECTION INTRAMUSCULAR; INTRAVENOUS at 09:42

## 2020-08-04 RX ADMIN — SODIUM CHLORIDE: 0.9 INJECTION, SOLUTION INTRAVENOUS at 09:36

## 2020-08-04 RX ADMIN — DEXAMETHASONE SODIUM PHOSPHATE 4 MG: 4 INJECTION, SOLUTION INTRA-ARTICULAR; INTRALESIONAL; INTRAMUSCULAR; INTRAVENOUS; SOFT TISSUE at 09:44

## 2020-08-04 RX ADMIN — FENTANYL CITRATE 25 MCG: 50 INJECTION, SOLUTION INTRAMUSCULAR; INTRAVENOUS at 09:42

## 2020-08-04 RX ADMIN — Medication 30 MG: at 09:46

## 2020-08-04 RX ADMIN — PROPOFOL 225 MCG/KG/MIN: 10 INJECTION, EMULSION INTRAVENOUS at 09:42

## 2020-08-04 RX ADMIN — KETOROLAC TROMETHAMINE 15 MG: 30 INJECTION, SOLUTION INTRAMUSCULAR at 09:44

## 2020-08-04 RX ADMIN — CEFTRIAXONE SODIUM 1 G: 1 INJECTION, SOLUTION INTRAVENOUS at 09:44

## 2020-08-04 RX ADMIN — DIPHENHYDRAMINE HYDROCHLORIDE 12.5 MG: 50 INJECTION INTRAMUSCULAR; INTRAVENOUS at 08:51

## 2020-08-04 RX ADMIN — FENTANYL CITRATE 50 MCG: 50 INJECTION, SOLUTION INTRAMUSCULAR; INTRAVENOUS at 12:26

## 2020-08-04 ASSESSMENT — MIFFLIN-ST. JEOR: SCORE: 1099.45

## 2020-08-04 ASSESSMENT — LIFESTYLE VARIABLES: TOBACCO_USE: 1

## 2020-08-04 NOTE — PROGRESS NOTES
Patient had chemo agent injected into bladder at 1210 per MD. Patient shortly after c/o of bladder spasms and cramping. Administered 50mcg of fentanyl and given warm pack, will continue to monitor pain. Villaseñor will be pulled at 1310, and patient will then attempt to void.

## 2020-08-04 NOTE — ANESTHESIA PROCEDURE NOTES
Airway   Date/Time: 8/4/2020 9:43 AM   Patient location during procedure: OR    General Information and Staff   Performed: CRNA     Consent for Airway   Urgency: elective        Indications and Patient Condition  Indications for airway management: sangita-procedural  Induction type:intravenous  Mask difficulty assessment: no attempt    Final Airway Details  Final airway type: supraglottic airway  Successful airway:LMA (IGEL)  Supraglottic airway: Size 4    Number of attempts at approach: 1  Number of other approaches attempted: 0      Ease of procedure: easy    Additional Comments  No complications encountered.

## 2020-08-04 NOTE — ANESTHESIA POSTPROCEDURE EVALUATION
Patient: Kait Augustin    Procedure(s):  Transurethral resection of bladder tumor  diagnostic ureteroscopy,ablation of urothelial tumor, possible fulguration through flexible ureteroscope  bilateral retrograde pyelogram    Diagnosis:History of bladder cancer [Z85.51]  Bladder tumor [D49.4]  Urothelial carcinoma of kidney, right (H) [C64.1]  Diagnosis Additional Information: No value filed.    Anesthesia Type:  General    Note:  Anesthesia Post Evaluation    Patient location during evaluation: PACU  Patient participation: Able to fully participate in evaluation  Level of consciousness: awake and alert  Pain management: adequate  Airway patency: patent  Cardiovascular status: acceptable  Respiratory status: acceptable  Hydration status: acceptable  PONV: none     Anesthetic complications: None          Last vitals:  Vitals:    08/04/20 1120 08/04/20 1125 08/04/20 1133   BP: 106/65 111/62    Pulse: 55 56    Resp: 11 13    Temp:      SpO2: 96% 95% 95%         Electronically Signed By: AISSATOU Hanley CRNA  August 4, 2020  11:38 AM

## 2020-08-04 NOTE — OP NOTE
Preoperative diagnosis  Bladder tumor  History of bladder cancer  History of right upper tract urothelial carcinoma    Postoperative diagnosis  Bladder tumor  History of bladder cancer  Right upper tract urothelial carcinoma, recurrent    Procedure  Transurethral resection of bladder tumor, 2-5cm  Cystoscopy with bilateral retrograde pyelogram  Interpretation of retrograde, bilateral  Right ureteroscopy with fulguration of upper tract tumor  Right ureteral stent placement    Surgeon  Delfino Walker MD    Surgeon(s)/Proceduralist(s) and Assistants (if any)  Surgeon(s):  Delfino Walker MD  Circulator: Jose Montesinos RN; Janene Doyle RN  Scrub Person: Cynthia Medle  First Assistant: Tatyana Worley RN; Mae Desir RN  Pre-Op Nurse: Luiz Hodge RN    Specimen(s)  Yes- Bladder tumor    (EBL) Estimated blood loss (ml)  10    Anesthesia  General    Complications  None    Findings  2cm papillary bladder tumor intertrigonal location, treated primarily with fulguration due to the flat papillary appearance    Left retrograde pyelogram revealed a delicate system with normal caliber ureter with no filling defects.      No filling defects with the renal pelvis or calyces.    Right retrograde pyelogram revealed a delicate system with normal caliber ureter with no filling defects.      No filling defects with the renal pelvis or calyces.    Right diagnostic ureteroscopy revealed a papillary low grade appearing tumor in the lower pole, 5mm in size.    Indications  60 year old female agreed to undergo the above named procedure after discussion of the alternatives, risks and benefits.    The patient was found to have a bladder tumor on surveillance cystoscopy.  She has a remote history of bladder cancer s/p TURBT x 3 involving the right ureteral orifice as well as history of  right upper tract urothelial carcinoma.  Informed consent was obtained.      Procedure  The patient was taken to the operating room and  placed supine on the operating table.    Pre-operative antibiotics were administered and bilateral lower extremity SCDs were placed.    After induction of general anesthesia the patient was positioned in dorsal lithotomy.    A time-out was performed and the patient was prepped and draped in a sterile fashion.      A 22 [place] rigid cystoscope was introduced into the bladder under direct vision and cystoscopy was performed.    A Sensor wire was passed through the left ureteral orifice and a 5 [place] catheter was passed over the wire and the wire was removed.    A retrograde pyelogram was performed by slowly injecting Omnipaque contrast, 5 mL, through the 5 [place] catheter.    The entire collecting system was fluoroscopically visualized with findings described above.      The 5 [place] catheter was then passed through the right ureteral orifice and the same procedure was performed as described above.    The entire collecting system was fluoroscopically visualized with findings described above.      A flexible ureteroscope was passed to the level of the right kidney and the tumor was identified as described.  The scope was removed.  An 8-10 was used to place a Sensor wire and Superstiff wire to the level of the kidney confirmed by fluoroscopy.  An access sheath, 11-13 x 35 was passed to the level of the kidney under fluoroscopic guidance.  The lower pole renal pelvis tumor was fulgurated with Bugbee.  Tumor was completely treated.    The access sheath was removed.  A 5-[place] open-ended catheter was passed over the wire.    A retrograde pyelogram was performed by slowly injecting 4mL of Omnipaque contrast via the 5 [place] catheter with findings described above.   A superstiff wire was then placed into the upper pole and a 6 x 22 [place] ureteral stent was placed in retrograde fashion under fluoroscopic guidance with good coil confirmed to be in the upper pole of the kidney and in the bladder.    A 26 [place] continuous  flow resectoscope was introduced into the bladder.   Using a working element, the described mass was resected.    This was felt to be a complete resection of visible tumor.    Hemostasis was confirmed and an 18F catheter was placed in a sterile fashion.      The patient tolerated the procedure well, was awakened and transferred to the recovery room in stable condition.    Plan  Follow up in clinic for stent pull in 1 week or so.

## 2020-08-04 NOTE — OR NURSING
PACU Respiratory Event Documentation     1) Episodes of Apnea greater than or equal to 10 seconds: no    2) Bradypnea - less than 8 breaths per minute: no    3) Pain score on 0 to 10 scale: 0    4) Pain-sedation mismatch (yes or no): no    5) Repeated 02 desaturation less than 90% (yes or no): no    Anesthesia notified? (yes or no): no    Any of the above events occuring repeatedly in separate 30 minute intervals may be considered recurrent PACU respiratory events.

## 2020-08-04 NOTE — ANESTHESIA PREPROCEDURE EVALUATION
Anesthesia Pre-Procedure Evaluation    Patient: Kait Augustin   MRN: [id number] : 1960          Preoperative Diagnosis: History of bladder cancer [Z85.51]  Bladder tumor [D49.4]  Urothelial carcinoma of kidney, right (H) [C64.1]    Procedure(s):  Transurethral resection of bladder tumor  diagnostic ureteroscopy,ablation of urothelial tumor, possible fulguration through flexible ureteroscope  bilateral retrograde pyelogram    Past Medical History:   Diagnosis Date     Cancer (H)     Bladder Cancer     H/O renal calculi 2014     MIGRAINE HEADACHES      Nicotine dependence 2014     PONV (postoperative nausea and vomiting)      Tobacco abuse 2014     Past Surgical History:   Procedure Laterality Date     C LIGATE FALLOPIAN TUBE       CYSTOSCOPY, RETROGRADES, INSERT STENT URETER(S), COMBINED Right 2015    Procedure: COMBINED CYSTOSCOPY, RETROGRADES, INSERT STENT URETER(S);  Surgeon: Efren Couch MD;  Location: [facility]     CYSTOSCOPY, TRANSURETHRAL RESECTION (TUR) PROSTATE, COMBINED Bilateral 2019    Procedure: Transurethral Resection of Bladder Tumor, BILATERAL RETROGRADE PYELOGRAMS, RIGHT URETEROSCOPY WITH HOLMIUM LASER TUMOR ABLATION AND STENT PLACEMENT;  Surgeon: Delfino Walker MD;  Location:  OR     CYSTOSCOPY, TRANSURETHRAL RESECTION (TUR) TUMOR BLADDER, COMBINED N/A 2/10/2015    Procedure: COMBINED CYSTOSCOPY, TRANSURETHRAL RESECTION (TUR) TUMOR BLADDER;  Surgeon: Efren Couch MD;  Location: [facility]     CYSTOSCOPY, TRANSURETHRAL RESECTION (TUR) TUMOR BLADDER, COMBINED N/A 2015    Procedure: COMBINED CYSTOSCOPY, TRANSURETHRAL RESECTION (TUR) TUMOR BLADDER;  Surgeon: Efren Couch MD;  Location: [facility]     CYSTOSCOPY, URETEROSCOPY, COMBINED Right 2019    Procedure: Right Ureteroscopy, Fulgaration if Right iUpper Tract Urothelial Carcinoma;  Surgeon: Delfino Walker MD;  Location:  OR       Anesthesia Evaluation     . Pt  has had prior anesthetic.     History of anesthetic complications   - PONV        ROS/MED HX    ENT/Pulmonary:     (+)tobacco use, Current use 12 cigarettes per day packs/day  , . .    Neurologic:  - neg neurologic ROS     Cardiovascular:  - neg cardiovascular ROS       METS/Exercise Tolerance:  >4 METS   Hematologic:  - neg hematologic  ROS       Musculoskeletal:  - neg musculoskeletal ROS       GI/Hepatic:  - neg GI/hepatic ROS       Renal/Genitourinary:     (+) chronic renal disease,       Endo:  - neg endo ROS       Psychiatric:  - neg psychiatric ROS       Infectious Disease:  - neg infectious disease ROS       Malignancy:   (+)   Bladder tumor        Other:    - neg other ROS                      Physical Exam  Normal systems: cardiovascular and pulmonary    Airway   Mallampati: II  TM distance: >3 FB  Neck ROM: full    Dental   (+) caps    Cardiovascular   Rhythm and rate: regular and normal      Pulmonary             Lab Results   Component Value Date    WBC 7.2 07/28/2020    HGB 14.4 07/28/2020    HCT 42.4 07/28/2020     07/28/2020     07/28/2020    POTASSIUM 4.0 07/28/2020    CHLORIDE 109 07/28/2020    CO2 24 07/28/2020    BUN 10 07/28/2020    CR 0.68 07/28/2020     (H) 07/28/2020    LONG 9.0 07/28/2020    PHOS 3.7 05/22/2019    ALBUMIN 4.1 07/28/2020    PROTTOTAL 7.2 07/28/2020    ALT 24 07/28/2020    AST 13 07/28/2020    ALKPHOS 118 07/28/2020    BILITOTAL 0.6 07/28/2020    INR 1.0 01/23/2015    TSH 1.00 11/17/2014       Preop Vitals  BP Readings from Last 3 Encounters:   07/28/20 114/68   06/09/20 120/78   04/21/20 112/66    Pulse Readings from Last 3 Encounters:   07/28/20 76   07/23/20 68   06/09/20 75      Resp Readings from Last 3 Encounters:   04/21/20 16   09/11/19 16   07/29/19 18    SpO2 Readings from Last 3 Encounters:   07/28/20 95%   06/09/20 98%   04/21/20 97%      Temp Readings from Last 1 Encounters:   07/28/20 98.1  F (36.7  C) (Tympanic)    Ht Readings from Last 1  "Encounters:   06/09/20 1.575 m (5' 2\")      Wt Readings from Last 1 Encounters:   07/28/20 57.6 kg (127 lb)    Estimated body mass index is 23.23 kg/m  as calculated from the following:    Height as of 6/9/20: 1.575 m (5' 2\").    Weight as of 7/28/20: 57.6 kg (127 lb).       Anesthesia Plan      History & Physical Review      ASA Status:  3 .    NPO Status:  > 8 hours    Plan for General with Intravenous induction. Maintenance will be TIVA.    PONV prophylaxis:  Ondansetron (or other 5HT-3), Dexamethasone or Solumedrol and Scopolamine patch  Risks, benefits and alternatives discussed and would like to proceed with LMA TIVA.         Postoperative Care  Postoperative pain management:  IV analgesics and Multi-modal analgesia.      Consents  Anesthetic plan, risks, benefits and alternatives discussed with:  Patient and Daughter/Son..                 AISSATOU Andrews CRNA  "

## 2020-08-04 NOTE — PROCEDURES
Preprocedure diagnosis  Bladder tumor  History of bladder cancer    Postprocedure diagnosis  Bladder tumor  History of bladder cancer    Procedure  Instillation of intravesical chemotherapy    Surgeon  Delfino Walker MD    Anesthesia  None    Complications  None    Indication  Kait Augustin is a 60 year old female who underwent TURBT in the OR today.  I recommended post-op intravesical chemo instillation.  Tumor appeared low grade and likely will not proceed to induction intravesical therapy.  TURBT went well and there was no obvious bladder perforation identified during the case.    Procedure  The patient was in the PACU with indwelling catheter in place.  I instilled gemcitabine 2000 mg in 100ml of normal saline via catheter and clamped the catheter.    Plan  Drain after 1 hour dwell time (13:15)  Catheter can then be removed.  Patient must void prior to discharge

## 2020-08-04 NOTE — ANESTHESIA CARE TRANSFER NOTE
Patient: Kait Augustin    Procedure(s):  Transurethral resection of bladder tumor  diagnostic ureteroscopy,ablation of urothelial tumor, possible fulguration through flexible ureteroscope  bilateral retrograde pyelogram    Diagnosis: History of bladder cancer [Z85.51]  Bladder tumor [D49.4]  Urothelial carcinoma of kidney, right (H) [C64.1]  Diagnosis Additional Information: No value filed.    Anesthesia Type:   General     Note:  Airway :LMA and T-piece  Patient transferred to:PACU  Handoff Report: Identifed the Patient, Identified the Reponsible Provider, Reviewed the pertinent medical history, Discussed the surgical course, Reviewed Intra-OP anesthesia mangement and issues during anesthesia, Set expectations for post-procedure period and Allowed opportunity for questions and acknowledgement of understanding      Vitals: (Last set prior to Anesthesia Care Transfer)    CRNA VITALS  8/4/2020 1011 - 8/4/2020 1044      8/4/2020             Resp Rate (observed):  (!) 2    Resp Rate (set):  10                Electronically Signed By: AISSATOU Hanley CRNA  August 4, 2020  10:44 AM

## 2020-08-07 ENCOUNTER — HOSPITAL ENCOUNTER (EMERGENCY)
Facility: HOSPITAL | Age: 60
Discharge: HOME | End: 2020-08-07
Attending: NURSE PRACTITIONER | Admitting: NURSE PRACTITIONER
Payer: COMMERCIAL

## 2020-08-07 ENCOUNTER — APPOINTMENT (OUTPATIENT)
Dept: CT IMAGING | Facility: HOSPITAL | Age: 60
End: 2020-08-07
Attending: NURSE PRACTITIONER
Payer: COMMERCIAL

## 2020-08-07 ENCOUNTER — TELEPHONE (OUTPATIENT)
Dept: FAMILY MEDICINE | Facility: OTHER | Age: 60
End: 2020-08-07

## 2020-08-07 VITALS
OXYGEN SATURATION: 96 % | RESPIRATION RATE: 16 BRPM | BODY MASS INDEX: 23.37 KG/M2 | HEIGHT: 62 IN | DIASTOLIC BLOOD PRESSURE: 72 MMHG | TEMPERATURE: 98.6 F | HEART RATE: 69 BPM | WEIGHT: 127 LBS | SYSTOLIC BLOOD PRESSURE: 112 MMHG

## 2020-08-07 DIAGNOSIS — R10.9 LEFT FLANK PAIN: ICD-10-CM

## 2020-08-07 DIAGNOSIS — E27.8 LEFT ADRENAL MASS (H): ICD-10-CM

## 2020-08-07 DIAGNOSIS — K57.32 DIVERTICULITIS OF COLON: ICD-10-CM

## 2020-08-07 LAB
ALBUMIN SERPL-MCNC: 3.8 G/DL (ref 3.4–5)
ALBUMIN UR-MCNC: 100 MG/DL
ALP SERPL-CCNC: 88 U/L (ref 40–150)
ALT SERPL W P-5'-P-CCNC: 20 U/L (ref 0–50)
ANION GAP SERPL CALCULATED.3IONS-SCNC: 6 MMOL/L (ref 3–14)
APPEARANCE UR: ABNORMAL
AST SERPL W P-5'-P-CCNC: 16 U/L (ref 0–45)
BACTERIA #/AREA URNS HPF: ABNORMAL /HPF
BASOPHILS # BLD AUTO: 0.1 10E9/L (ref 0–0.2)
BASOPHILS NFR BLD AUTO: 0.5 %
BILIRUB SERPL-MCNC: 1.6 MG/DL (ref 0.2–1.3)
BILIRUB UR QL STRIP: NEGATIVE
BUN SERPL-MCNC: 18 MG/DL (ref 7–30)
CALCIUM SERPL-MCNC: 8.7 MG/DL (ref 8.5–10.1)
CHLORIDE SERPL-SCNC: 111 MMOL/L (ref 94–109)
CO2 SERPL-SCNC: 22 MMOL/L (ref 20–32)
COLOR UR AUTO: ABNORMAL
CREAT SERPL-MCNC: 0.98 MG/DL (ref 0.52–1.04)
DIFFERENTIAL METHOD BLD: NORMAL
EOSINOPHIL # BLD AUTO: 0.2 10E9/L (ref 0–0.7)
EOSINOPHIL NFR BLD AUTO: 1.9 %
ERYTHROCYTE [DISTWIDTH] IN BLOOD BY AUTOMATED COUNT: 12.7 % (ref 10–15)
GFR SERPL CREATININE-BSD FRML MDRD: 63 ML/MIN/{1.73_M2}
GLUCOSE SERPL-MCNC: 112 MG/DL (ref 70–99)
GLUCOSE UR STRIP-MCNC: NEGATIVE MG/DL
HCT VFR BLD AUTO: 38.2 % (ref 35–47)
HGB BLD-MCNC: 13.3 G/DL (ref 11.7–15.7)
HGB UR QL STRIP: ABNORMAL
IMM GRANULOCYTES # BLD: 0 10E9/L (ref 0–0.4)
IMM GRANULOCYTES NFR BLD: 0.3 %
KETONES UR STRIP-MCNC: 10 MG/DL
LEUKOCYTE ESTERASE UR QL STRIP: ABNORMAL
LYMPHOCYTES # BLD AUTO: 1.2 10E9/L (ref 0.8–5.3)
LYMPHOCYTES NFR BLD AUTO: 11.8 %
MCH RBC QN AUTO: 31.4 PG (ref 26.5–33)
MCHC RBC AUTO-ENTMCNC: 34.8 G/DL (ref 31.5–36.5)
MCV RBC AUTO: 90 FL (ref 78–100)
MONOCYTES # BLD AUTO: 0.2 10E9/L (ref 0–1.3)
MONOCYTES NFR BLD AUTO: 1.8 %
MUCOUS THREADS #/AREA URNS LPF: PRESENT /LPF
NEUTROPHILS # BLD AUTO: 8.3 10E9/L (ref 1.6–8.3)
NEUTROPHILS NFR BLD AUTO: 83.7 %
NITRATE UR QL: NEGATIVE
NRBC # BLD AUTO: 0 10*3/UL
NRBC BLD AUTO-RTO: 0 /100
PH UR STRIP: 6 PH (ref 4.7–8)
PLATELET # BLD AUTO: 195 10E9/L (ref 150–450)
POTASSIUM SERPL-SCNC: 3.8 MMOL/L (ref 3.4–5.3)
PROT SERPL-MCNC: 7 G/DL (ref 6.8–8.8)
RBC # BLD AUTO: 4.24 10E12/L (ref 3.8–5.2)
RBC #/AREA URNS AUTO: >182 /HPF (ref 0–2)
SODIUM SERPL-SCNC: 139 MMOL/L (ref 133–144)
SOURCE: ABNORMAL
SP GR UR STRIP: 1.02 (ref 1–1.03)
UROBILINOGEN UR STRIP-MCNC: NORMAL MG/DL (ref 0–2)
WBC # BLD AUTO: 9.9 10E9/L (ref 4–11)
WBC #/AREA URNS AUTO: 67 /HPF (ref 0–5)

## 2020-08-07 PROCEDURE — 99285 EMERGENCY DEPT VISIT HI MDM: CPT | Mod: 25

## 2020-08-07 PROCEDURE — 25800030 ZZH RX IP 258 OP 636: Performed by: NURSE PRACTITIONER

## 2020-08-07 PROCEDURE — 96375 TX/PRO/DX INJ NEW DRUG ADDON: CPT

## 2020-08-07 PROCEDURE — 80053 COMPREHEN METABOLIC PANEL: CPT | Performed by: NURSE PRACTITIONER

## 2020-08-07 PROCEDURE — 25000128 H RX IP 250 OP 636: Performed by: NURSE PRACTITIONER

## 2020-08-07 PROCEDURE — 25000132 ZZH RX MED GY IP 250 OP 250 PS 637: Performed by: NURSE PRACTITIONER

## 2020-08-07 PROCEDURE — 81001 URINALYSIS AUTO W/SCOPE: CPT | Performed by: NURSE PRACTITIONER

## 2020-08-07 PROCEDURE — 99284 EMERGENCY DEPT VISIT MOD MDM: CPT | Mod: Z6 | Performed by: NURSE PRACTITIONER

## 2020-08-07 PROCEDURE — 25500064 ZZH RX 255 OP 636: Performed by: RADIOLOGY

## 2020-08-07 PROCEDURE — 85025 COMPLETE CBC W/AUTO DIFF WBC: CPT | Performed by: NURSE PRACTITIONER

## 2020-08-07 PROCEDURE — 96365 THER/PROPH/DIAG IV INF INIT: CPT | Mod: XU

## 2020-08-07 PROCEDURE — 36415 COLL VENOUS BLD VENIPUNCTURE: CPT | Performed by: NURSE PRACTITIONER

## 2020-08-07 PROCEDURE — 74177 CT ABD & PELVIS W/CONTRAST: CPT | Mod: TC

## 2020-08-07 PROCEDURE — 87040 BLOOD CULTURE FOR BACTERIA: CPT | Performed by: NURSE PRACTITIONER

## 2020-08-07 PROCEDURE — 87086 URINE CULTURE/COLONY COUNT: CPT | Performed by: NURSE PRACTITIONER

## 2020-08-07 RX ORDER — BISACODYL 5 MG
10 TABLET, DELAYED RELEASE (ENTERIC COATED) ORAL ONCE
Status: COMPLETED | OUTPATIENT
Start: 2020-08-07 | End: 2020-08-07

## 2020-08-07 RX ORDER — ONDANSETRON 2 MG/ML
4 INJECTION INTRAMUSCULAR; INTRAVENOUS EVERY 30 MIN PRN
Status: DISCONTINUED | OUTPATIENT
Start: 2020-08-07 | End: 2020-08-07 | Stop reason: HOSPADM

## 2020-08-07 RX ORDER — CEFTRIAXONE SODIUM 1 G/50ML
1 INJECTION, SOLUTION INTRAVENOUS ONCE
Status: COMPLETED | OUTPATIENT
Start: 2020-08-07 | End: 2020-08-07

## 2020-08-07 RX ORDER — HYDROMORPHONE HYDROCHLORIDE 1 MG/ML
0.5 INJECTION, SOLUTION INTRAMUSCULAR; INTRAVENOUS; SUBCUTANEOUS
Status: COMPLETED | OUTPATIENT
Start: 2020-08-07 | End: 2020-08-07

## 2020-08-07 RX ORDER — IOPAMIDOL 612 MG/ML
100 INJECTION, SOLUTION INTRAVASCULAR ONCE
Status: COMPLETED | OUTPATIENT
Start: 2020-08-07 | End: 2020-08-07

## 2020-08-07 RX ORDER — METRONIDAZOLE 500 MG/1
500 TABLET ORAL 3 TIMES DAILY
Qty: 15 TABLET | Refills: 0 | Status: SHIPPED | OUTPATIENT
Start: 2020-08-07 | End: 2020-08-12

## 2020-08-07 RX ORDER — CIPROFLOXACIN 500 MG/1
500 TABLET, FILM COATED ORAL 2 TIMES DAILY
Qty: 10 TABLET | Refills: 0 | Status: SHIPPED | OUTPATIENT
Start: 2020-08-07 | End: 2020-08-12

## 2020-08-07 RX ADMIN — ONDANSETRON 4 MG: 2 INJECTION INTRAMUSCULAR; INTRAVENOUS at 12:04

## 2020-08-07 RX ADMIN — HYDROMORPHONE HYDROCHLORIDE 0.5 MG: 1 INJECTION, SOLUTION INTRAMUSCULAR; INTRAVENOUS; SUBCUTANEOUS at 12:04

## 2020-08-07 RX ADMIN — SODIUM CHLORIDE 1000 ML: 9 INJECTION, SOLUTION INTRAVENOUS at 12:04

## 2020-08-07 RX ADMIN — CEFTRIAXONE SODIUM 1 G: 1 INJECTION, SOLUTION INTRAVENOUS at 12:49

## 2020-08-07 RX ADMIN — BISACODYL 10 MG: 5 TABLET ORAL at 13:22

## 2020-08-07 RX ADMIN — IOPAMIDOL 100 ML: 612 INJECTION, SOLUTION INTRAVENOUS at 12:21

## 2020-08-07 ASSESSMENT — ENCOUNTER SYMPTOMS
HEMATURIA: 1
BLOOD IN STOOL: 0
LIGHT-HEADEDNESS: 0
DIARRHEA: 0
DIZZINESS: 0
CONSTIPATION: 1
FREQUENCY: 1
ABDOMINAL DISTENTION: 0
FEVER: 0
VOMITING: 0
NAUSEA: 1
ABDOMINAL PAIN: 1
FLANK PAIN: 1
COUGH: 0
SHORTNESS OF BREATH: 0
HEADACHES: 0
CHILLS: 0
DYSURIA: 0

## 2020-08-07 ASSESSMENT — MIFFLIN-ST. JEOR: SCORE: 1099.32

## 2020-08-07 NOTE — TELEPHONE ENCOUNTER
Pt called, post op day 3 from resection of bladder and kidney tumors with Dr Walker on 8/4. Pt reports increased kidney pain rated 8/10. States that she was prescribed oxycodone for pain and has been taking med with minimal improvement. Pt was advised to contact Dr Walker's office. She reports that she did however Dr Walker is out of town and she was referred to contact PCP. Pt was advised to be seen in ER due to pain level and concern for potential post op complications. Pt agrees with this plan.

## 2020-08-07 NOTE — ED AVS SNAPSHOT
[facility]  [address] [address]  Phone:  [phone]                                    Kait Augustin   MRN: [id number]    Department:  [facility]   Date of Visit:  8/7/2020           After Visit Summary Signature Page    I have received my discharge instructions, and my questions have been answered. I have discussed any challenges I see with this plan with the nurse or doctor.    ..........................................................................................................................................  Patient/Patient Representative Signature      ..........................................................................................................................................  Patient Representative Print Name and Relationship to Patient    ..................................................               ................................................  Date                                   Time    ..........................................................................................................................................  Reviewed by Signature/Title    ...................................................              ..............................................  Date                                               Time          22EPIC Rev 08/18

## 2020-08-07 NOTE — DISCHARGE INSTRUCTIONS
(R10.9) Left flank pain  (E27.8) Left adrenal mass (H)  (K57.32) Diverticulitis of colon  60-year old female presents ambulatory with concerns of left flank pain. She is 3 days s/p transurethral resection of bladder tumor, right ureteral stent. She denies fever, chills. UA concerning for infection which could be contributing to left flank pain. She is given rocephin 1 gram IV in the ED. Urine culture pending. She does have some LLQ abdominal discomfort and constipation since procedure- CT scan shows findings consistent with mild diverticulitis. Will treat with cipro and flagyl which should cover diverticulitis and pyelonephritis of left kidney.   Recommend:  - Start first dose of metronidazole (Flagyl) 500 mg when you  from pharmacy. This will be taken three times daily (breakfast, lunch, dinner) for five days.  - Start Ciprofloxacin (Cipro) 500 mg when you pick it up from pharmacy. This will be taken twice daily (breakfast and dinner) for 5 days.   - Take entire course of antibiotic even if you start to feel better.  - Antibiotics can cause stomach upset including nausea and diarrhea. Read your bottle or ask the pharmacist if antibiotic can be taken with food to help prevent nausea. If you have symptoms of diarrhea you can take an over-the-counter probiotic and/or increase foods with probiotics such as yogurt, Wilfrido, sauerkraut.    - Dulcolax 10 mg oral given in ED for constipation. Continue with Miralax daily at home while taking pain medications until bowels are back to regular.  - Manage pain with acetaminophen (Tylenol), ibuprofen (Advil), hydrocodone as directed, heating pad. You can alternate acetaminophen and ibuprofen every 4 hours. For example: 8 am ibuprofen, 12 pm acetaminophen, 4 pm ibuprofen, 8 pm acetaminophen, etc.         RETURN TO THE ED WITH NEW OR WORSENING SYMPTOMS INCLUDING BUT NOT LIMITED TO FEVER, CHILLS, WORSENING PAIN, VOMITING.    KEEP APPOINTMENT TO FOLLOW-UP WITH DR. DUKES ON  Monday.      Amber Rivas CNP      What to expect when you have contrast    During your exam, we will inject  contrast  into your vein or artery. (Contrast is a clear liquid with iodine in it. It shows up on X-rays.)    You may feel warm or hot. You may have a metal taste in your mouth and a slight upset stomach. You may also feel pressure near the kidneys and bladder. These effects will last about 1 to 3 minutes.    Please tell us if you have:   Sneezing    Itching   Hives    Swelling in the face   A hoarse voice   Breathing problems   Other new symptoms    Serious problems are rare.  They may include:   Irregular heartbeat    Seizures   Kidney failure             Tissue damage   Shock     Death    If you have any problems during the exam, we  will treat them right away.    When you get home    Call your hospital if you have any new symptoms in the next 2 days, like hives or swelling. (Phone numbers are at the bottom of this page.) Or call your family doctor.     If you have wheezing or trouble breathing, call 911.    Self-care  -Drink at least 4 extra glasses of water today.   This reduces the stress on your kidneys.  -Keep taking your regular medicines.    The contrast will pass out of your body in your  Urine(pee). This will happen in the next 24 hours. You  will not feel this. Your urine will not  change color.    If you have kidney problems or take metformin    Drink 4 to 8 large glasses of water for the next  2 days, if you are not on a fluid restriction.    ?If you take metformin (Glucophage or Glucovance) for diabetes, keep taking it.      ?Your kidney function tests are abnormal.  If you take Metformin, do not take it for 48 hours. Please go to your clinic for a blood test within 3 days after your exam before the restarting this medicine.     (Note to provider:please give patient prescription for lab tests.)    ?Special instructions: -    I have read and understand the above information.    Patient  Sign Here:______________________________________Date:________Time:______    Staff Sign Here:________________________________________Date:_______Time:______      Radiology Departments:     ?[facility]: [phone] ?[facility]: [phone]     ?[facility]: [phone] ?[place]:[phone]      ?[facility]: [phone]  ?[place]: [phone]  ?[place]:[phone]    ?[facility]:[phone]  ?[facility]:[phone]

## 2020-08-07 NOTE — ED PROVIDER NOTES
"  History     Chief Complaint   Patient presents with     Flank Pain     HPI     Kait Augustin is a 60 year old female who presents ambulatory for concerns of left flank pain. Pain currently 9/10, constant, dull. She does have urinary frequency which she relates to stent being placed. No dysuria. Pink blood in the toilet with voiding. Last bowel movement- 'a little this morning,\" no other bowel movement since surgery. She took Miralax last night. She does have nausea. She denies associated fever, chills, vomiting. \"Maybe a little bit way down below\" of abdominal discomfort.     0600- full hydrocodone, \"a little\" relief.   0930- half of hydrocodone, no relief.       Allergies:  Allergies   Allergen Reactions     No Known Allergies        Problem List:    Patient Active Problem List    Diagnosis Date Noted     Bladder tumor 07/23/2020     Priority: Medium     Added automatically from request for surgery [id number]       Urothelial carcinoma of kidney, right (H) 06/03/2019     Priority: Medium     History of bladder cancer 06/03/2019     Priority: Medium     ACP (advance care planning) 12/13/2016     Priority: Medium     Advance Care Planning 12/13/2016: ACP Review of Chart / Resources Provided:  Reviewed chart for advance care plan.  Kait Augustin has no plan or code status on file. Discussed available resources and provided with information. Confirmed code status reflects current choices pending further ACP discussions.  Confirmed/documented legally designated decision makers.  Added by Radha Parekh             Malignant neoplasm of overlapping sites of bladder (H) 12/22/2015     Priority: Medium     H/O renal calculi 11/17/2014     Priority: Medium        Past Medical History:    Past Medical History:   Diagnosis Date     Cancer (H)      H/O renal calculi 11/17/2014     MIGRAINE HEADACHES 1996     Nicotine dependence 11/17/2014     PONV (postoperative nausea and vomiting)      Tobacco abuse " 11/17/2014       Past Surgical History:    Past Surgical History:   Procedure Laterality Date     C LIGATE FALLOPIAN TUBE  1996     CYSTOSCOPY, RETROGRADES, INSERT STENT URETER(S), COMBINED Right 12/8/2015    Procedure: COMBINED CYSTOSCOPY, RETROGRADES, INSERT STENT URETER(S);  Surgeon: Efren Couch MD;  Location: [facility]     CYSTOSCOPY, RETROGRADES, INSERT STENT URETER(S), COMBINED Bilateral 8/4/2020    Procedure: bilateral retrograde pyelogram;  Surgeon: Delfino Walker MD;  Location: [facility]     CYSTOSCOPY, TRANSURETHRAL RESECTION (TUR) PROSTATE, COMBINED Bilateral 5/7/2019    Procedure: Transurethral Resection of Bladder Tumor, BILATERAL RETROGRADE PYELOGRAMS, RIGHT URETEROSCOPY WITH HOLMIUM LASER TUMOR ABLATION AND STENT PLACEMENT;  Surgeon: Delfino Walker MD;  Location: [facility] OR     CYSTOSCOPY, TRANSURETHRAL RESECTION (TUR) TUMOR BLADDER INSTILL CHEMOTHERAPY, COMBINED N/A 8/4/2020    Procedure: Transurethral resection of bladder tumor;  Surgeon: Delfino Walker MD;  Location: [facility]     CYSTOSCOPY, TRANSURETHRAL RESECTION (TUR) TUMOR BLADDER, COMBINED N/A 2/10/2015    Procedure: COMBINED CYSTOSCOPY, TRANSURETHRAL RESECTION (TUR) TUMOR BLADDER;  Surgeon: Efren Couch MD;  Location: [facility]     CYSTOSCOPY, TRANSURETHRAL RESECTION (TUR) TUMOR BLADDER, COMBINED N/A 12/8/2015    Procedure: COMBINED CYSTOSCOPY, TRANSURETHRAL RESECTION (TUR) TUMOR BLADDER;  Surgeon: Efren Couch MD;  Location: [facility]     CYSTOSCOPY, URETEROSCOPY, COMBINED Right 5/28/2019    Procedure: Right Ureteroscopy, Fulgaration if Right iUpper Tract Urothelial Carcinoma;  Surgeon: Delfino Walker MD;  Location: [facility]     CYSTOSCOPY, URETEROSCOPY, COMBINED Right 8/4/2020    Procedure: diagnostic ureteroscopy,ablation of urothelial tumor, possible fulguration through flexible ureteroscope;  Surgeon: Delfino Walker MD;  Location: [facility] OR       Family History:    Family History   Problem Relation Age of Onset     C.A.D. Father  "        dx late 60's, CABG, angioplasty     Cancer Father         Lung, dx age 80 h/o tobacco use     Heart Disease Mother      Thyroid Disease Mother      Lipids Sister        Social History:  Marital Status:   [2]  Social History     Tobacco Use     Smoking status: Smoker, Current Status Unknown     Packs/day: 0.50     Years: 45.00     Pack years: 22.50     Types: Cigarettes     Smokeless tobacco: Never Used     Tobacco comment: wants the quit plan   Substance Use Topics     Alcohol use: Yes     Comment: rare     Drug use: No        Medications:    ciprofloxacin (CIPRO) 500 MG tablet  HYDROcodone-acetaminophen (NORCO) 5-325 MG tablet  metroNIDAZOLE (FLAGYL) 500 MG tablet  metroNIDAZOLE (METROGEL) 1 % external gel          Review of Systems   Constitutional: Negative for chills and fever.   Respiratory: Negative for cough and shortness of breath. Wheezing: left.    Cardiovascular: Negative for chest pain and leg swelling.   Gastrointestinal: Positive for abdominal pain, constipation and nausea. Negative for abdominal distention, blood in stool, diarrhea and vomiting.   Genitourinary: Positive for flank pain, frequency and hematuria. Negative for dysuria.   Neurological: Negative for dizziness, light-headedness and headaches.       Physical Exam   BP: 117/77  Pulse: 72  Heart Rate: 90  Temp: 97.6  F (36.4  C)  Resp: 16  Height: 157.5 cm (5' 2\")  Weight: 57.6 kg (127 lb)  SpO2: 98 %      Physical Exam  Constitutional:       General: She is in acute distress.      Appearance: She is not toxic-appearing or diaphoretic.   Cardiovascular:      Rate and Rhythm: Normal rate and regular rhythm.      Heart sounds: S1 normal and S2 normal. No murmur. No friction rub. No gallop.    Pulmonary:      Effort: Pulmonary effort is normal.      Breath sounds: Normal breath sounds. No wheezing, rhonchi or rales.   Abdominal:      General: Bowel sounds are decreased. There is no distension.      Palpations: Abdomen is soft.      " Tenderness: There is abdominal tenderness in the left lower quadrant. There is left CVA tenderness. There is no right CVA tenderness, guarding or rebound.   Musculoskeletal:      Right lower leg: No edema.      Left lower leg: No edema.   Skin:     General: Skin is warm and dry.      Capillary Refill: Capillary refill takes less than 2 seconds.   Neurological:      Mental Status: She is alert and oriented to person, place, and time.      GCS: GCS eye subscore is 4. GCS verbal subscore is 5. GCS motor subscore is 6.   Psychiatric:         Mood and Affect: Mood normal.         Speech: Speech normal.         Behavior: Behavior normal. Behavior is cooperative.         ED Course        Procedures         Results for orders placed or performed during the hospital encounter of 08/07/20 (from the past 24 hour(s))   UA reflex to Microscopic and Culture    Specimen: Unspecified Urine   Result Value Ref [facility]    Color Urine Orange     Appearance Urine Slightly Cloudy     Glucose Urine Negative NEG^Negative mg/dL    Bilirubin Urine Negative NEG^Negative    Ketones Urine 10 (A) NEG^Negative mg/dL    Specific Gravity Urine 1.016 1.003 - 1.035    Blood Urine Large (A) NEG^Negative    pH Urine 6.0 4.7 - 8.0 pH    Protein Albumin Urine 100 (A) NEG^Negative mg/dL    Urobilinogen mg/dL Normal 0.0 - 2.0 mg/dL    Nitrite Urine Negative NEG^Negative    Leukocyte Esterase Urine Large (A) NEG^Negative    Source Unspecified Urine     RBC Urine >182 (H) 0 - 2 /HPF    WBC Urine 67 (H) 0 - 5 /HPF    Bacteria Urine None (A) NEG^Negative /HPF    Mucous Urine Present (A) NEG^Negative /LPF   CBC with platelets differential   Result Value Ref [facility]    WBC 9.9 4.0 - 11.0 10e9/L    RBC Count 4.24 3.8 - 5.2 10e12/L    Hemoglobin 13.3 11.7 - 15.7 g/dL    Hematocrit 38.2 35.0 - 47.0 %    MCV 90 78 - 100 fl    MCH 31.4 26.5 - 33.0 pg    MCHC 34.8 31.5 - 36.5 g/dL    RDW 12.7 10.0 - 15.0 %    Platelet Count 195 150 - 450 10e9/L    Diff Method Automated  Method     % Neutrophils 83.7 %    % Lymphocytes 11.8 %    % Monocytes 1.8 %    % Eosinophils 1.9 %    % Basophils 0.5 %    % Immature Granulocytes 0.3 %    Nucleated RBCs 0 0 /100    Absolute Neutrophil 8.3 1.6 - 8.3 10e9/L    Absolute Lymphocytes 1.2 0.8 - 5.3 10e9/L    Absolute Monocytes 0.2 0.0 - 1.3 10e9/L    Absolute Eosinophils 0.2 0.0 - 0.7 10e9/L    Absolute Basophils 0.1 0.0 - 0.2 10e9/L    Abs Immature Granulocytes 0.0 0 - 0.4 10e9/L    Absolute Nucleated RBC 0.0    Comprehensive metabolic panel   Result Value Ref [facility]    Sodium 139 133 - 144 mmol/L    Potassium 3.8 3.4 - 5.3 mmol/L    Chloride 111 (H) 94 - 109 mmol/L    Carbon Dioxide 22 20 - 32 mmol/L    Anion Gap 6 3 - 14 mmol/L    Glucose 112 (H) 70 - 99 mg/dL    Urea Nitrogen 18 7 - 30 mg/dL    Creatinine 0.98 0.52 - 1.04 mg/dL    GFR Estimate 63 >60 mL/min/[1.73_m2]    GFR Estimate If Black 73 >60 mL/min/[1.73_m2]    Calcium 8.7 8.5 - 10.1 mg/dL    Bilirubin Total 1.6 (H) 0.2 - 1.3 mg/dL    Albumin 3.8 3.4 - 5.0 g/dL    Protein Total 7.0 6.8 - 8.8 g/dL    Alkaline Phosphatase 88 40 - 150 U/L    ALT 20 0 - 50 U/L    AST 16 0 - 45 U/L   CT Abdomen Pelvis w Contrast    Narrative    PROCEDURE: CT ABDOMEN PELVIS W CONTRAST 8/7/2020 12:30 PM    HISTORY: LLQ tenderness, left flank pain, recent transurethral  resection of bladder tumor & stent placed in right kidney  (urothelial carcinoma of right kidney), no BM since surgery on Tuesday    COMPARISONS: 1/8/2015.    Meds/Dose Given: ISOVUE 300  100ML    TECHNIQUE: Axial postcontrast enhanced images with coronal and  sagittal reformatted images.    FINDINGS: Lung bases are clear except for some mild dependent  atelectasis.    There is a cyst in the left lobe of the liver, also seen on the prior  exam. There is also a 1.4 cm hypoechoic lesion in the caudal right  lobe of the liver, seen on the prior exam and not significantly  changed. It does not appear to be a simple cyst based on Hounsfield  unit  measurements but could represent a small hemangioma. Stability  suggests that it is benign.    No focal abnormality is seen in the spleen, pancreas or in the right  adrenal gland. There is a 2.6 cm left adrenal mass which was not  present previously.    No solid renal mass is seen. There is a small lesion in the upper pole  of the left kidney, stable and considered benign. No solid renal mass  is seen. There is a right ureteral stent.    There is some minimal soft tissue stranding in the fat around the  proximal sigmoid colon and there may be some mild diverticulitis. No  other significant bowel abnormality is seen. No pelvic fluid  collection is seen. No lymph node enlargement is seen.    There is no aneurysm. There is no focal lesion.         Impression    IMPRESSION:   1. Very mild soft tissue stranding around the distal  descending/proximal sigmoid colon consistent with mild diverticulitis.  No abscess or fluid collection.  2. Minimal left adrenal mass, not well characterized on a single  postcontrast enhanced exam. Follow-up noncontrast enhanced exam could  assess for fat which would indicate an adenoma.    OREN SALDAÑA MD       Medications   ondansetron (ZOFRAN) injection 4 mg (4 mg Intravenous Given 8/7/20 1204)   HYDROmorphone (PF) (DILAUDID) injection 0.5 mg (0.5 mg Intravenous Given 8/7/20 1204)   0.9% sodium chloride BOLUS (1,000 mLs Intravenous New Bag 8/7/20 1204)   cefTRIAXone in d5w (ROCEPHIN) intermittent infusion 1 g (0 g Intravenous Stopped 8/7/20 1322)   sodium chloride (PF) 0.9% PF flush 60 mL (60 mLs Intravenous Given 8/7/20 1221)   iopamidol (ISOVUE-300) IV solution 61% 100 mL (100 mLs Intravenous Given 8/7/20 1221)   bisacodyl (DULCOLAX) EC tablet 10 mg (10 mg Oral Given 8/7/20 1322)       Assessments & Plan (with Medical Decision Making)     I have reviewed the nursing notes.    I have reviewed the findings, diagnosis, plan and need for follow up with the patient.  (R10.9) Left flank  pain  (E27.8) Left adrenal mass (H)  (K57.32) Diverticulitis of colon  60-year old female presents ambulatory with concerns of left flank pain. She is 3 days s/p transurethral resection of bladder tumor, right ureteral stent. She denies fever, chills. UA concerning for infection which could be contributing to left flank pain. She is given rocephin 1 gram IV in the ED. Urine culture pending. She does have some LLQ abdominal discomfort and constipation since procedure- CT scan shows findings consistent with mild diverticulitis. Will treat with cipro and flagyl which should cover diverticulitis and pyelonephritis of left kidney.   Recommend:  - Start first dose of metronidazole (Flagyl) 500 mg when you  from pharmacy. This will be taken three times daily (breakfast, lunch, dinner) for five days.  - Start Ciprofloxacin (Cipro) 500 mg when you pick it up from pharmacy. This will be taken twice daily (breakfast and dinner) for 5 days.     - Dulcolax 10 mg oral given in ED for constipation. Continue with Miralax daily at home while taking pain medications until bowels are back to regular.  - Manage pain with acetaminophen (Tylenol), ibuprofen (Advil), hydrocodone as directed, heating pad. You can alternate acetaminophen and ibuprofen every 4 hours. For example: 8 am ibuprofen, 12 pm acetaminophen, 4 pm ibuprofen, 8 pm acetaminophen, etc.         RETURN TO THE ED WITH NEW OR WORSENING SYMPTOMS INCLUDING BUT NOT LIMITED TO FEVER, CHILLS, WORSENING PAIN, VOMITING.    KEEP APPOINTMENT TO FOLLOW-UP WITH DR. DUKES ON Monday.      Amber Rivas CNP          New Prescriptions    CIPROFLOXACIN (CIPRO) 500 MG TABLET    Take 1 tablet (500 mg) by mouth 2 times daily for 5 days    METRONIDAZOLE (FLAGYL) 500 MG TABLET    Take 1 tablet (500 mg) by mouth 3 times daily for 5 days       Final diagnoses:   Left flank pain   Left adrenal mass (H)   Diverticulitis of colon       8/7/2020   [facility]     Rob  Amber, DEBBIE  08/08/20 1362

## 2020-08-07 NOTE — ED TRIAGE NOTES
"Patient presents with complaints of left flank pain.  States on Wednesday, she had a urology procedure (bladder cancer, \"kidney something\") patient notes yesterday having some pain but today pain is much worse today and PO pain medications are not helping.  Dr. Walker did the procedure and he is not in the office.  Patient has had bloody urine since the procedure; denies any clots.  "

## 2020-08-09 LAB
BACTERIA SPEC CULT: NO GROWTH
SPECIMEN SOURCE: NORMAL

## 2020-08-10 ENCOUNTER — OFFICE VISIT (OUTPATIENT)
Dept: UROLOGY | Facility: OTHER | Age: 60
End: 2020-08-10
Attending: UROLOGY
Payer: COMMERCIAL

## 2020-08-10 DIAGNOSIS — E27.8 ADRENAL MASS, LEFT (H): ICD-10-CM

## 2020-08-10 DIAGNOSIS — Z85.51 HISTORY OF BLADDER CANCER: Primary | ICD-10-CM

## 2020-08-10 PROCEDURE — 52310 CYSTOSCOPY AND TREATMENT: CPT | Performed by: UROLOGY

## 2020-08-10 PROCEDURE — 99214 OFFICE O/P EST MOD 30 MIN: CPT | Mod: 25 | Performed by: UROLOGY

## 2020-08-10 ASSESSMENT — PAIN SCALES - GENERAL: PAINLEVEL: NO PAIN (0)

## 2020-08-10 NOTE — PROGRESS NOTES
Preoperative diagnosis  History of bladder cancer    Postoperative diagnosis  History of bladder cancer    Procedure  Flexible cystourethroscopy with stent removal    Surgeon  Delfino Walker MD    Anesthesia  2% lidocaine jelly intraurethrally    Complications  None    Indications  60 year old female who is status post TURBT who presents for stent removal.    Procedure  The patient was given one dose of antibiotics. The patient was placed in supine position and was prepped and draped in sterile fashion.  2% lidocaine jelly was bluntly injected per urethra without difficulty. I passed the 14 Guyanese flexible cystoscope through the urethra and into the bladder.  With the aid of a stent grasper I grasped and removed the stent in its entirety.  The patient tolerated the procedure well.    Imaging  CT a/p  8/7/2020  IMPRESSION:   1. Very mild soft tissue stranding around the distal  descending/proximal sigmoid colon consistent with mild diverticulitis.  No abscess or fluid collection.  2. Minimal left adrenal mass, not well characterized on a single  postcontrast enhanced exam. Follow-up noncontrast enhanced exam could  assess for fat which would indicate an adenoma.    Assessment  Ms. Augustin is a 60-year-old female status post TURBT and treatment of right upper tract urothelial carcinoma who underwent successful stent removal today.  Reviewed her pathology report.    She was recently in the ED and underwent unanticipated cross-sectional imaging revealing a possible left adrenal mass.  She did not undergo noncontrast imaging.  We discussed this finding and noncontrast imaging would be the next best step to hopefully prove this is a benign adenoma.    In addition we discussed her upper tract urothelial carcinoma history.  She is not interested in aggressive surveillance.  She now has had 2 tumors treated of the right kidney.    In addition she underwent TURBT involving her right ureteral orifice.  I have recommended  ultrasound in 3 months.    Plan  CT noncontrast, next available to assess adrenal nodule  Follow-up in 3 months with renal ultrasound prior to cystoscopy   -We will need to plan upper tract surveillance at some point          I spent 25 minutes on this patient's visit (exclusive of separately billed services/procedures) and over half of this time was spent in face-to-face counseling regarding stone prevention:  Prevention strategies with fluids and diet, rationale for a metabolic work up, prognosis and importance of compliance.

## 2020-08-10 NOTE — NURSING NOTE
Cefuroxime 500mg tablet (2-250mg tablets with same lot # and expiration date) by mouth one time now ordered by Delfino Walker MD.  Medication administered per verbal order   Lot # [id number]  Exp. 06/23/21  Patient tolerated well.  Amber Eason LPN..................8/10/2020  2:32 PM

## 2020-08-10 NOTE — PATIENT INSTRUCTIONS

## 2020-08-13 LAB
BACTERIA SPEC CULT: NORMAL
BACTERIA SPEC CULT: NORMAL
Lab: NORMAL
Lab: NORMAL
SPECIMEN SOURCE: NORMAL
SPECIMEN SOURCE: NORMAL

## 2020-08-18 ENCOUNTER — HOSPITAL ENCOUNTER (OUTPATIENT)
Dept: CT IMAGING | Facility: HOSPITAL | Age: 60
Discharge: HOME | End: 2020-08-18
Attending: UROLOGY | Admitting: UROLOGY
Payer: COMMERCIAL

## 2020-08-18 DIAGNOSIS — E27.8 ADRENAL MASS, LEFT (H): ICD-10-CM

## 2020-08-18 PROCEDURE — 74150 CT ABDOMEN W/O CONTRAST: CPT | Mod: TC

## 2020-08-19 ENCOUNTER — VIRTUAL VISIT (OUTPATIENT)
Dept: UROLOGY | Facility: OTHER | Age: 60
End: 2020-08-19
Attending: UROLOGY
Payer: COMMERCIAL

## 2020-08-19 VITALS — WEIGHT: 123 LBS | BODY MASS INDEX: 22.5 KG/M2

## 2020-08-19 DIAGNOSIS — Z85.51 HISTORY OF BLADDER CANCER: ICD-10-CM

## 2020-08-19 DIAGNOSIS — E27.9 ADRENAL NODULE (H): Primary | ICD-10-CM

## 2020-08-19 PROCEDURE — 99212 OFFICE O/P EST SF 10 MIN: CPT | Mod: TEL | Performed by: UROLOGY

## 2020-08-19 ASSESSMENT — PAIN SCALES - GENERAL: PAINLEVEL: NO PAIN (0)

## 2020-08-19 NOTE — PROGRESS NOTES
"Kait Augustin is a 60 year old female who is being evaluated via a billable telephone visit.      The patient has been notified of following:     \"This telephone visit will be conducted via a call between you and your physician/provider. We have found that certain health care needs can be provided without the need for a physical exam.  This service lets us provide the care you need with a short phone conversation.  If a prescription is necessary we can send it directly to your pharmacy.  If lab work is needed we can place an order for that and you can then stop by our lab to have the test done at a later time.    If during the course of the call the physician/provider feels a telephone visit is not appropriate, you will not be charged for this service.\"     Patient has given verbal consent for Telephone visit?  Yes    Kait Augustin complains of    Chief Complaint   Patient presents with     Follow Up     CT results       I have reviewed and updated the patient's Past Medical History, Social History, Family History and Medication List.    ALLERGIES  No known allergies    Notes and Assessment  Patient has a history of bladder cancer as well as upper tract urothelial carcinoma.  Recent imaging involving IV contrast revealed a concerning adrenal nodule.  Because of this she recently underwent a noncontrast CT scan to evaluate the adrenal nodule.  She underwent the imaging yesterday and follows up over the phone to discuss the results.  She denies any changes other than increased hair loss.  She denies flank pain, recent gross hematuria or dysuria.    Imaging  CT abdomen  8/18/2020  IMPRESSION: Benign-appearing left adrenal mass.    ^^^^^^^^^^^^^^^^^^^^^^^^^^^^^^^^^^^^^^^^^^^^^^^^^^^^^^^^^^^^^^^^^    CT a/p  8/7/2020  IMPRESSION:   1. Very mild soft tissue stranding around the distal  descending/proximal sigmoid colon consistent with mild diverticulitis.  No abscess or fluid collection.  2. Minimal left " adrenal mass, not well characterized on a single  postcontrast enhanced exam. Follow-up noncontrast enhanced exam could  assess for fat which would indicate an adenoma.    Assessment  Ms. Augustin is a 60-year-old female status post TURBT and treatment of right upper tract urothelial carcinoma who underwent recent CT scan revealing benign characteristics of the adrenal nodule.    In addition we discussed her upper tract urothelial carcinoma history.  She is not interested in aggressive surveillance.  She now has had 2 tumors treated of the right kidney.    In addition she underwent TURBT involving her right ureteral orifice.  I have recommended ultrasound in 3 months.    Plan  Follow-up in 3 months with renal ultrasound prior to cystoscopy   -We will need to plan upper tract surveillance at some point        Delfino Walker MD      Phone call duration: 11 minutes

## 2020-08-24 ENCOUNTER — TELEPHONE (OUTPATIENT)
Dept: FAMILY MEDICINE | Facility: OTHER | Age: 60
End: 2020-08-24

## 2020-08-24 DIAGNOSIS — E55.9 VITAMIN D DEFICIENCY: ICD-10-CM

## 2020-08-24 DIAGNOSIS — L65.9 ALOPECIA: ICD-10-CM

## 2020-08-24 DIAGNOSIS — E55.9 VITAMIN D DEFICIENCY: Primary | ICD-10-CM

## 2020-08-24 LAB — TSH SERPL DL<=0.005 MIU/L-ACNC: 0.47 MU/L (ref 0.4–4)

## 2020-08-24 PROCEDURE — 84443 ASSAY THYROID STIM HORMONE: CPT | Performed by: NURSE PRACTITIONER

## 2020-08-24 PROCEDURE — 36415 COLL VENOUS BLD VENIPUNCTURE: CPT | Performed by: NURSE PRACTITIONER

## 2020-08-24 PROCEDURE — 82306 VITAMIN D 25 HYDROXY: CPT | Performed by: NURSE PRACTITIONER

## 2020-08-24 NOTE — TELEPHONE ENCOUNTER
7:44 AM    Reason for Call: OVERBOOK    Patient is having the following symptoms: hair loss  for 1 weeks. This started last Tuesday and has been very extreme. States she hardly has any hair left.    The patient is requesting an appointment for overbook today with Itzel Phillip. Pt states she only has today off from work.    Was an appointment offered for this call? Yes  If yes : Appointment type              Date 08-25-20 declined because she has to work    Preferred method for responding to this message: Telephone Call  What is your phone number ? [phone]    If we cannot reach you directly, may we leave a detailed response at the number you provided? Yes    Can this message wait until your PCP/provider returns, if unavailable today? Not applicable, provider is in today    Naomie Gonzalez

## 2020-08-24 NOTE — TELEPHONE ENCOUNTER
There is nothing open today on [facility] schedule... please advise. Ashley LeChevalier LPN

## 2020-08-24 NOTE — TELEPHONE ENCOUNTER
I have no idea what would make her lose almost all of her hair in 1 week.    Labs at the end of July were normal, I can order a TSH for lab only.  Would she like a derm referral?    [facility]  [phone]

## 2020-08-24 NOTE — TELEPHONE ENCOUNTER
Patient will come in for labs today. She wants to hold off on referrals depending on what labs come back stating. She states she did have surgery on Tuesday the 4th and then ended up in the ED that following Friday on antibiotics. She wasn't sure if it was something caused by that or not. Labs pended. She did request vitamin D level as well. Ashley LeChevalier LPN

## 2020-08-25 DIAGNOSIS — E55.9 VITAMIN D DEFICIENCY: Primary | ICD-10-CM

## 2020-08-25 LAB — DEPRECATED CALCIDIOL+CALCIFEROL SERPL-MC: 17 UG/L (ref 20–75)

## 2020-08-25 RX ORDER — ERGOCALCIFEROL 1.25 MG/1
CAPSULE, LIQUID FILLED ORAL
Qty: 36 CAPSULE | Refills: 0 | Status: SHIPPED | OUTPATIENT
Start: 2020-08-25 | End: 2021-02-22

## 2020-08-28 ENCOUNTER — ANCILLARY PROCEDURE (OUTPATIENT)
Dept: MAMMOGRAPHY | Facility: OTHER | Age: 60
End: 2020-08-28
Attending: NURSE PRACTITIONER
Payer: COMMERCIAL

## 2020-08-28 DIAGNOSIS — Z12.31 ENCOUNTER FOR SCREENING MAMMOGRAM FOR BREAST CANCER: ICD-10-CM

## 2020-08-28 PROCEDURE — 77067 SCR MAMMO BI INCL CAD: CPT | Mod: TC

## 2020-09-11 ENCOUNTER — TELEPHONE (OUTPATIENT)
Dept: FAMILY MEDICINE | Facility: OTHER | Age: 60
End: 2020-09-11

## 2020-09-11 DIAGNOSIS — L63.9 AA (ALOPECIA AREATA): Primary | ICD-10-CM

## 2020-09-11 NOTE — TELEPHONE ENCOUNTER
9:32 AM    Reason for Call: Phone Call    Description: PT STATES SHE IS STILL HAVING ISSUES WITH HAIR LOSS. PLEASE CALL BACK, PT STATES SHE IS TAKING THE VITAMIN D. SHE STATES IT DOES NOT SEEM TO BE STOPPING THE HAIR LOSS.     Was an appointment offered for this call? No  If yes : Appointment type              Date    Preferred method for responding to this message: Telephone Call  What is your phone number ? [phone]    If we cannot reach you directly, may we leave a detailed response at the number you provided? Yes    Can this message wait until your PCP/provider returns, if available today? YES    Gela Ocampo

## 2020-09-30 ENCOUNTER — OFFICE VISIT (OUTPATIENT)
Dept: PODIATRY | Facility: OTHER | Age: 60
End: 2020-09-30
Attending: PODIATRIST
Payer: COMMERCIAL

## 2020-09-30 VITALS
DIASTOLIC BLOOD PRESSURE: 70 MMHG | HEART RATE: 70 BPM | OXYGEN SATURATION: 98 % | BODY MASS INDEX: 23.78 KG/M2 | SYSTOLIC BLOOD PRESSURE: 118 MMHG | WEIGHT: 130 LBS | TEMPERATURE: 96.9 F

## 2020-09-30 DIAGNOSIS — Z71.6 TOBACCO ABUSE COUNSELING: ICD-10-CM

## 2020-09-30 DIAGNOSIS — M21.6X1 PROMINENT METATARSAL HEAD OF RIGHT FOOT: ICD-10-CM

## 2020-09-30 DIAGNOSIS — M79.671 RIGHT FOOT PAIN: ICD-10-CM

## 2020-09-30 DIAGNOSIS — M21.6X2 PROMINENT METATARSAL HEAD OF LEFT FOOT: ICD-10-CM

## 2020-09-30 DIAGNOSIS — F17.210 CIGARETTE NICOTINE DEPENDENCE WITHOUT COMPLICATION: ICD-10-CM

## 2020-09-30 DIAGNOSIS — M20.42 ACQUIRED HAMMER TOE DEFORMITY OF LESSER TOE OF BOTH FEET: ICD-10-CM

## 2020-09-30 DIAGNOSIS — M20.41 ACQUIRED HAMMER TOE DEFORMITY OF LESSER TOE OF BOTH FEET: ICD-10-CM

## 2020-09-30 DIAGNOSIS — M21.42 PES PLANUS OF BOTH FEET: ICD-10-CM

## 2020-09-30 DIAGNOSIS — G57.61 LESION OF RIGHT PLANTAR NERVE: Primary | ICD-10-CM

## 2020-09-30 DIAGNOSIS — G57.62 LESION OF LEFT PLANTAR NERVE: ICD-10-CM

## 2020-09-30 DIAGNOSIS — M21.41 PES PLANUS OF BOTH FEET: ICD-10-CM

## 2020-09-30 DIAGNOSIS — S92.354A CLOSED NONDISPLACED FRACTURE OF FIFTH METATARSAL BONE OF RIGHT FOOT, INITIAL ENCOUNTER: ICD-10-CM

## 2020-09-30 PROCEDURE — 20600 DRAIN/INJ JOINT/BURSA W/O US: CPT | Mod: LT | Performed by: PODIATRIST

## 2020-09-30 PROCEDURE — 99213 OFFICE O/P EST LOW 20 MIN: CPT | Mod: 25 | Performed by: PODIATRIST

## 2020-09-30 RX ORDER — METHYLPREDNISOLONE ACETATE 40 MG/ML
40 INJECTION, SUSPENSION INTRA-ARTICULAR; INTRALESIONAL; INTRAMUSCULAR; SOFT TISSUE ONCE
Status: COMPLETED | OUTPATIENT
Start: 2020-09-30 | End: 2020-09-30

## 2020-09-30 RX ADMIN — METHYLPREDNISOLONE ACETATE 40 MG: 40 INJECTION, SUSPENSION INTRA-ARTICULAR; INTRALESIONAL; INTRAMUSCULAR; SOFT TISSUE at 09:09

## 2020-09-30 ASSESSMENT — PAIN SCALES - GENERAL: PAINLEVEL: SEVERE PAIN (6)

## 2020-09-30 NOTE — PROGRESS NOTES
Chief complaint: Patient presents with:  Consult: NPT  Right Foot Pain      History of Present Illness: This 60 year old female is seen for follow-up management of bilateral 4th interspace pain. Patient had an injection in June, 2020 on the bilateral foot but the injections only helped the pain for a day or two. In the mornings, she walks on the lateral aspect of her foot to offload the painful area, and she thinks this may have been how she injured her 5th metatarsal in the past.     Her pain in the bilateral 4th interspace is exactly the same with no improvement. She does not have pain once she has been walking all day, but once she rests her foot or goes to bed at night then stands after resting the foot, the pain is severe. She has to walk about 20 feet before it improves.    She was called by [place], but she did not schedule an appointment to get fit for the orthotics.    She smokes 12 cigarettes a day and she is not interested in the Quit Plan referral. She had this referral in June, 2020, and she does not want another referral.    No further pedal complaints today.       /70   Pulse 70   Temp 96.9  F (36.1  C) (Tympanic)   Wt 59 kg (130 lb)   LMP 06/18/2005   SpO2 98%   BMI 23.78 kg/m      Patient Active Problem List   Diagnosis     H/O renal calculi     Malignant neoplasm of overlapping sites of bladder (H)     ACP (advance care planning)     Urothelial carcinoma of kidney, right (H)     History of bladder cancer     Bladder tumor       Past Surgical History:   Procedure Laterality Date     C LIGATE FALLOPIAN TUBE  1996     CYSTOSCOPY, RETROGRADES, INSERT STENT URETER(S), COMBINED Right 12/8/2015    Procedure: COMBINED CYSTOSCOPY, RETROGRADES, INSERT STENT URETER(S);  Surgeon: Efren Couch MD;  Location: [facility] OR     CYSTOSCOPY, RETROGRADES, INSERT STENT URETER(S), COMBINED Bilateral 8/4/2020    Procedure: bilateral retrograde pyelogram;  Surgeon: Delfino Walker MD;  Location:  OR      CYSTOSCOPY, TRANSURETHRAL RESECTION (TUR) PROSTATE, COMBINED Bilateral 5/7/2019    Procedure: Transurethral Resection of Bladder Tumor, BILATERAL RETROGRADE PYELOGRAMS, RIGHT URETEROSCOPY WITH HOLMIUM LASER TUMOR ABLATION AND STENT PLACEMENT;  Surgeon: Delfino Walker MD;  Location: [facility] OR     CYSTOSCOPY, TRANSURETHRAL RESECTION (TUR) TUMOR BLADDER INSTILL CHEMOTHERAPY, COMBINED N/A 8/4/2020    Procedure: Transurethral resection of bladder tumor;  Surgeon: Delfino Walker MD;  Location: [facility] OR     CYSTOSCOPY, TRANSURETHRAL RESECTION (TUR) TUMOR BLADDER, COMBINED N/A 2/10/2015    Procedure: COMBINED CYSTOSCOPY, TRANSURETHRAL RESECTION (TUR) TUMOR BLADDER;  Surgeon: Efren Couch MD;  Location: [facility]     CYSTOSCOPY, TRANSURETHRAL RESECTION (TUR) TUMOR BLADDER, COMBINED N/A 12/8/2015    Procedure: COMBINED CYSTOSCOPY, TRANSURETHRAL RESECTION (TUR) TUMOR BLADDER;  Surgeon: Efren Couch MD;  Location: [facility] OR     CYSTOSCOPY, URETEROSCOPY, COMBINED Right 5/28/2019    Procedure: Right Ureteroscopy, Fulgaration if Right iUpper Tract Urothelial Carcinoma;  Surgeon: Delfino Walker MD;  Location:  OR     CYSTOSCOPY, URETEROSCOPY, COMBINED Right 8/4/2020    Procedure: diagnostic ureteroscopy,ablation of urothelial tumor, possible fulguration through flexible ureteroscope;  Surgeon: Delfino Walker MD;  Location:  OR       Current Outpatient Medications   Medication     metroNIDAZOLE (METROGEL) 1 % external gel     vitamin D2 (ERGOCALCIFEROL) 99881 units (1250 mcg) capsule     No current facility-administered medications for this visit.           Allergies   Allergen Reactions     No Known Allergies        Family History   Problem Relation Age of Onset     C.A.D. Father         dx late 60's, CABG, angioplasty     Cancer Father         Lung, dx age 80 h/o tobacco use     Heart Disease Mother      Thyroid Disease Mother      Lipids Sister        Social History     Socioeconomic History     Marital status:       Spouse name: Not on file     Number of children: Not on file     Years of education: Not on file     Highest education level: Not on file   Occupational History     Occupation: Manager -[employer]     Employer: [employer]   Social Needs     Financial resource strain: Not on file     Food insecurity     Worry: Not on file     Inability: Not on file     Transportation needs     Medical: Not on file     Non-medical: Not on file   Tobacco Use     Smoking status: Smoker, Current Status Unknown     Packs/day: 1.00     Years: 45.00     Pack years: 45.00     Types: Cigarettes     Smokeless tobacco: Never Used   Substance and Sexual Activity     Alcohol use: Yes     Comment: rare     Drug use: No     Sexual activity: Yes     Partners: Male   Lifestyle     Physical activity     Days per week: Not on file     Minutes per session: Not on file     Stress: Not on file   Relationships     Social connections     Talks on phone: Not on file     Gets together: Not on file     Attends [employer] service: Not on file     Active member of club or organization: Not on file     Attends meetings of clubs or organizations: Not on file     Relationship status: Not on file     Intimate partner violence     Fear of current or ex partner: Not on file     Emotionally abused: Not on file     Physically abused: Not on file     Forced sexual activity: Not on file   Other Topics Concern     Parent/sibling w/ CABG, MI or angioplasty before 65F 55M? Yes     Comment: Mom was in her 60's and had angioplasty   Social History Narrative     Not on file       ROS: 10 point ROS neg other than the symptoms noted above in the HPI.  EXAM  Constitutional: healthy, alert and no distress    Psychiatric: mentation appears normal and affect normal/bright    VASCULAR:  -Dorsalis pedis pulse +2/4 b/l  -Posterior tibial pulse +1/4 b/l  -Capillary refill time < 3 seconds to b/l hallux  NEURO:  -Light touch sensation intact to b/l plantar forefoot  DERM:  -Skin  temperature within normal limits   -Skin mildly thin and shiny to bilateral feet  MSK:  -Pain on palpation to bilateral dorsal 4th interspace between metatarsal heads (RIGHT >> LEFT)  -Moderate decrease in arch height while patient is NWB  -Prominent metatarsal heads 2-5 bilaterally with fat pad atrophy  -DORSIFLEXION contracture to MTPJ 2-5 b/l with flexion contracture to PIPJ of digits 2-5 b/l with the RIGHT 2nd digit with the most flexion deformity  -Muscle strength of ankles +5/5 for dorsiflexion, plantarflexion, ABDUction and ADDuction b/l      RADIOGRAPH RIGHT FOOT 04/21/2020  FINDINGS: The AP view only there is suggestion of a transverse nondisplaced fracture through the base of the fifth metatarsal. This is not confirmed on other images.  No other fracture is seen and there is no dislocation.  There is degenerative change in multiple interphalangeal joints. There is spur formation at the insertion site of the Achilles tendon and along the plantar surface.  IMPRESSION: Questionable nondisplaced fracture through the base of the fifth metatarsal, only seen on one view.    RADIOGRAPH RIGHT FOOT 06/09/2020  FINDINGS:  There is bony spurring at the insertion of the Achilles tendon and plantar fascia into the calcaneus. There is a questionable nondisplaced fractures of the proximal fifth metatarsal unchanged in alignment from previous examination on April 21, 2020.  IMPRESSION: No change from April 21, 2020    MARLA SHEN MD  ============================================================    ASSESSMENT:  (G57.61) Lesion of right plantar nerve  (primary encounter diagnosis)    (M79.671) Right foot pain    (G57.62) Lesion of left plantar nerve    (M21.41,  M21.42) Pes planus of both feet    (S92.354A) Closed nondisplaced fracture of fifth metatarsal bone of right foot, initial encounter    (M21.6X1) Prominent metatarsal head of right foot    (M21.6X2) Prominent metatarsal head of left foot    (M20.41,  M20.42)  Acquired hammer toe deformity of lesser toe of both feet    (F17.200) Nicotine dependence, uncomplicated, unspecified nicotine product type    (F17.210) Cigarette nicotine dependence without complication    (Z71.6) Tobacco abuse counseling      PLAN:  -Patient evaluated and examined. Treatment options discussed with no educational barriers noted.  -Patient's previous fracture pain is fully resolved.    -Patient still has bilateral neuroma pain (RIGHT >> LEFT). Explained neuroma pain including causes and treatment.   ---Patient has not tried consistent stability shoes (she presents in a lightweight slip-on shoe), she has not tried compression socks, she is not icing, and she did not schedule her appointment with [facility]. Explained how pain can improve with these treatment options. She does not necessarily need a custom orthotic, but a metatarsal pad / bar could help relieve some pain.  ---Patient had an injection in the foot ten years ago and it made this same pain go away for ten years. Neuromas respond differently to treatment options at different times. Offered another injection with a different steroid and she would like to try this today. She may or may not have long-term pain relief, especially if she is not addressing the biomechanics with stability shoes and inserts or a metatarsal pad. Her foot may have changed from ten years ago which may be contributing to the continuance of pain post injection.    -Start more consistent stability Shoe Gear: Discussed proper shoe gear with patient. This involves wearing a stability shoe that bends at the toe, but has a solid midfoot shank and heel contour.     -Call  for CMO with a metatarsal pad or a solid OTC insert with a metatarsal pad for additional foot support. Patient educated on slowly transitioning into the inserts. The patient may call the orthotist to make adjustments if the inserts are not comfortable after consistently wearing them for 4-6 weeks.      -Injection x 1 to LEFT 4th intermetatarsal interspace: Obtained written and verbal consent from patient for a steroid injection into the dorsal 4th interspace of the bilateral foot. Reviewed risks and benefits of the injection. Informed patient that the injection may decrease pain long-term, short-term, or not at all. Patient in agreement with an injection today in an effort to slow or reverse the chronic inflammatory process and pain in the foot.   ---Patient signed informed consent and a time-out was performed and there was verification of correct patient, procedure and laterality.  ---Prepped the injection site with an alcohol swab.  ---Injected a total of 2 mL of 1:1 of 40mg Depo-Medrol and 2% Lidocaine plain into each foot. Patient tolerated the injection well.    -Tobacco cessation discussed with patient including the benefits of quitting and the risks of not quitting. The Quit Plan referral was offered and patient stated she wants the referral. Patient is interested in quitting today.    -Patient in agreement with the above treatment plan and all of patient's questions were answered.      RTC as needed for 4th interspace pain if pain worsens or fails to improve        Michelle Dumont DPM

## 2020-09-30 NOTE — NURSING NOTE
"Chief Complaint   Patient presents with     Consult     NPT  Right Foot Pain       Initial /70   Pulse 70   Temp 96.9  F (36.1  C) (Tympanic)   Wt 59 kg (130 lb)   LMP 06/18/2005   SpO2 98%   BMI 23.78 kg/m   Estimated body mass index is 23.78 kg/m  as calculated from the following:    Height as of 8/7/20: 1.575 m (5' 2\").    Weight as of this encounter: 59 kg (130 lb).  Medication Reconciliation: complete  Astrid Rojas LPN  "

## 2020-09-30 NOTE — PROGRESS NOTES
Clinic Administered Medication Documentation      Injectable Medication Documentation    Patient was given Depomedrol 40 mg. Prior to medication administration, verified patients identity using patient s name and date of birth. Please see MAR and medication order for additional information. Patient instructed to report any adverse reaction to staff immediately .      Was entire vial of medication used? Yes  Vial/Syringe: Single dose vial  Expiration Date:  07/2021  Was this medication supplied by the patient? No

## 2020-10-08 ENCOUNTER — TRANSFERRED RECORDS (OUTPATIENT)
Dept: HEALTH INFORMATION MANAGEMENT | Facility: CLINIC | Age: 60
End: 2020-10-08

## 2020-10-12 NOTE — PROGRESS NOTES
SUBJECTIVE:   CC: Kait Augustin is an 60 year old woman who presents for preventive health visit.   Patient has been advised of split billing requirements and indicates understanding: Yes     Healthy Habits:    Do you get at least three servings of calcium containing foods daily (dairy, green leafy vegetables, etc.)? yes and no, taking calcium and/or vitamin D supplement: yes - vit d        Amount of exercise or daily activities, outside of work: 5 day(s) per week    Problems taking medications regularly No    Medication side effects: No    Have you had an eye exam in the past two years? no    Do you see a dentist twice per year? yes    Do you have sleep apnea, excessive snoring or daytime drowsiness?no    Hair Loss    Duration: 2 months    Description (location/character/radiation): Head    Intensity:  moderate    Accompanying signs and symptoms: Stress, surgery    History (similar episodes/previous evaluation): None    Precipitating or alleviating factors: None    Therapies tried and outcome: None    Patient is following with dermatology regarding hair loss.         PHQ 10/15/2018 4/21/2020 10/13/2020   PHQ-9 Total Score 0 3 7   Q9: Thoughts of better off dead/self-harm past 2 weeks Not at all Not at all Not at all     ADONIS-7 SCORE 10/15/2018 4/21/2020 10/13/2020   Total Score 1 5 10       Abuse: Current or Past(Physical, Sexual or Emotional)- No  Do you feel safe in your environment? Yes      Social History     Tobacco Use     Smoking status: Current Every Day Smoker     Packs/day: 0.50     Years: 45.00     Pack years: 22.50     Types: Cigarettes     Smokeless tobacco: Never Used   Substance Use Topics     Alcohol use: Yes     Comment: rare       If you drink alcohol do you typically have >3 drinks per day or >7 drinks per week? No                       Reviewed orders with patient.  Reviewed health maintenance and updated orders accordingly - Yes  Lab work is in process  Labs reviewed in EPIC  BP  Readings from Last 3 Encounters:   10/13/20 122/70   09/30/20 118/70   08/07/20 112/72    Wt Readings from Last 3 Encounters:   10/13/20 56.7 kg (125 lb)   09/30/20 59 kg (130 lb)   08/19/20 55.8 kg (123 lb)                  Patient Active Problem List   Diagnosis     H/O renal calculi     Malignant neoplasm of overlapping sites of bladder (H)     ACP (advance care planning)     Urothelial carcinoma of kidney, right (H)     History of bladder cancer     Bladder tumor     Past Surgical History:   Procedure Laterality Date     C LIGATE FALLOPIAN TUBE  1996     CYSTOSCOPY, RETROGRADES, INSERT STENT URETER(S), COMBINED Right 12/8/2015    Procedure: COMBINED CYSTOSCOPY, RETROGRADES, INSERT STENT URETER(S);  Surgeon: Efren Couch MD;  Location: [facility]     CYSTOSCOPY, RETROGRADES, INSERT STENT URETER(S), COMBINED Bilateral 8/4/2020    Procedure: bilateral retrograde pyelogram;  Surgeon: Delfino Walker MD;  Location: [facility]     CYSTOSCOPY, TRANSURETHRAL RESECTION (TUR) PROSTATE, COMBINED Bilateral 5/7/2019    Procedure: Transurethral Resection of Bladder Tumor, BILATERAL RETROGRADE PYELOGRAMS, RIGHT URETEROSCOPY WITH HOLMIUM LASER TUMOR ABLATION AND STENT PLACEMENT;  Surgeon: Delfino Walker MD;  Location: [facility]     CYSTOSCOPY, TRANSURETHRAL RESECTION (TUR) TUMOR BLADDER INSTILL CHEMOTHERAPY, COMBINED N/A 8/4/2020    Procedure: Transurethral resection of bladder tumor;  Surgeon: Delfino Walker MD;  Location: [facility]     CYSTOSCOPY, TRANSURETHRAL RESECTION (TUR) TUMOR BLADDER, COMBINED N/A 2/10/2015    Procedure: COMBINED CYSTOSCOPY, TRANSURETHRAL RESECTION (TUR) TUMOR BLADDER;  Surgeon: Efren Couch MD;  [facility]     CYSTOSCOPY, TRANSURETHRAL RESECTION (TUR) TUMOR BLADDER, COMBINED N/A 12/8/2015    Procedure: COMBINED CYSTOSCOPY, TRANSURETHRAL RESECTION (TUR) TUMOR BLADDER;  Surgeon: Efren Couch MD;  [facility]     CYSTOSCOPY, URETEROSCOPY, COMBINED Right 5/28/2019     Procedure: Right Ureteroscopy, Fulgaration if Right iUpper Tract Urothelial Carcinoma;  Surgeon: Delfino Walker MD;  Location:  OR     CYSTOSCOPY, URETEROSCOPY, COMBINED Right 8/4/2020    Procedure: diagnostic ureteroscopy,ablation of urothelial tumor, possible fulguration through flexible ureteroscope;  Surgeon: Delfnio Walker MD;  Location:  OR       Social History     Tobacco Use     Smoking status: Current Every Day Smoker     Packs/day: 0.50     Years: 45.00     Pack years: 22.50     Types: Cigarettes     Smokeless tobacco: Never Used   Substance Use Topics     Alcohol use: Yes     Comment: rare     Family History   Problem Relation Age of Onset     C.A.D. Father         dx late 60's, CABG, angioplasty     Cancer Father         Lung, dx age 80 h/o tobacco use     Heart Disease Mother      Thyroid Disease Mother      Lipids Sister          Current Outpatient Medications   Medication Sig Dispense Refill     nicotine (NICODERM CQ) 21 MG/24HR 24 hr patch Place 1 patch onto the skin every 24 hours 30 patch 1     vitamin D2 (ERGOCALCIFEROL) 00629 units (1250 mcg) capsule 1 po 3 times weekly for 3 months the discontinue 36 capsule 0     metroNIDAZOLE (METROGEL) 1 % external gel Apply topically daily 60 g 1     Allergies   Allergen Reactions     No Known Allergies      Recent Labs   Lab Test 10/13/20  0908 08/24/20  1055 08/07/20  1119 07/28/20  1044 11/17/14  1052 11/17/14  1052   *  --   --   --   --  180*   HDL 71  --   --   --   --  65   TRIG 86  --   --   --   --  128   ALT 20  --  20 24   < >  --    CR 0.68  --  0.98 0.68   < > 0.71   GFRESTIMATED >90  --  63 >90   < > 86   GFRESTBLACK >90  --  73 >90   < > >90  African American GFR Calc     POTASSIUM 3.9  --  3.8 4.0   < > 3.9   TSH  --  0.47  --   --   --  1.00    < > = values in this interval not displayed.        UTD Mammogram      Pertinent mammograms are reviewed under the imaging tab.  History of abnormal Pap smear: NO - age 30-65 PAP every 5  years with negative HPV co-testing recommended  PAP / HPV 11/17/2014 9/15/2008 6/22/2005   PAP NIL NIL NIL     Reviewed and updated as needed this visit by clinical staff  Tobacco  Allergies  Meds              Reviewed and updated as needed this visit by Provider                Past Medical History:   Diagnosis Date     Cancer (H)     Bladder Cancer     H/O renal calculi 11/17/2014     MIGRAINE HEADACHES 1996     Nicotine dependence 11/17/2014     PONV (postoperative nausea and vomiting)      Tobacco abuse 11/17/2014        Past Surgical History:   Procedure Laterality Date     C LIGATE FALLOPIAN TUBE  1996     CYSTOSCOPY, RETROGRADES, INSERT STENT URETER(S), COMBINED Right 12/8/2015    Procedure: COMBINED CYSTOSCOPY, RETROGRADES, INSERT STENT URETER(S);  Surgeon: Efren Couch MD;  [facility]     CYSTOSCOPY, RETROGRADES, INSERT STENT URETER(S), COMBINED Bilateral 8/4/2020    Procedure: bilateral retrograde pyelogram;  Surgeon: Delfino Walker MD;  [facility]     CYSTOSCOPY, TRANSURETHRAL RESECTION (TUR) PROSTATE, COMBINED Bilateral 5/7/2019    Procedure: Transurethral Resection of Bladder Tumor, BILATERAL RETROGRADE PYELOGRAMS, RIGHT URETEROSCOPY WITH HOLMIUM LASER TUMOR ABLATION AND STENT PLACEMENT;  Surgeon: Delfino Walker MD;  [facility]     CYSTOSCOPY, TRANSURETHRAL RESECTION (TUR) TUMOR BLADDER INSTILL CHEMOTHERAPY, COMBINED N/A 8/4/2020    Procedure: Transurethral resection of bladder tumor;  Surgeon: Delfino Walker MD;  [facility]     CYSTOSCOPY, TRANSURETHRAL RESECTION (TUR) TUMOR BLADDER, COMBINED N/A 2/10/2015    Procedure: COMBINED CYSTOSCOPY, TRANSURETHRAL RESECTION (TUR) TUMOR BLADDER;  Surgeon: Efren Couch MD;  [facility]     CYSTOSCOPY, TRANSURETHRAL RESECTION (TUR) TUMOR BLADDER, COMBINED N/A 12/8/2015    Procedure: COMBINED CYSTOSCOPY, TRANSURETHRAL RESECTION (TUR) TUMOR BLADDER;  Surgeon: Efren Couch MD;  [facility]      "CYSTOSCOPY, URETEROSCOPY, COMBINED Right 5/28/2019    Procedure: Right Ureteroscopy, Fulgaration if Right iUpper Tract Urothelial Carcinoma;  Surgeon: Delfino Walker MD;  Location:  OR     CYSTOSCOPY, URETEROSCOPY, COMBINED Right 8/4/2020    Procedure: diagnostic ureteroscopy,ablation of urothelial tumor, possible fulguration through flexible ureteroscope;  Surgeon: Delfino Walker MD;  Location: [facility]       OB History   No obstetric history on file.       ROS:  CONSTITUTIONAL: NEGATIVE for fever, chills, change in weight  INTEGUMENTARY/SKIN: NEGATIVE for worrisome rashes, moles or lesions  EYES: NEGATIVE for vision changes or irritation  ENT: NEGATIVE for ear, mouth and throat problems  RESP: NEGATIVE for significant cough or SOB  BREAST: NEGATIVE for masses, tenderness or discharge  CV: NEGATIVE for chest pain, palpitations or peripheral edema  GI: NEGATIVE for nausea, abdominal pain, heartburn, or change in bowel habits  : NEGATIVE for unusual urinary or vaginal symptoms. No vaginal bleeding.  MUSCULOSKELETAL: NEGATIVE for significant arthralgias or myalgia  NEURO: NEGATIVE for weakness, dizziness or paresthesias  ENDOCRINE: POSITIVE  for hair loss.   HEME/ALLERGY/IMMUNE: NEGATIVE for bleeding problems  PSYCHIATRIC: POSITIVE for mild anxiety and occasional depressed mood- reports she feels like it is being managed.     OBJECTIVE:   /70 (BP Location: Right arm, Patient Position: Chair, Cuff Size: Adult Regular)   Pulse 71   Temp 97  F (36.1  C) (Tympanic)   Ht 1.575 m (5' 2\")   Wt 56.7 kg (125 lb)   LMP 06/18/2005   SpO2 98%   BMI 22.86 kg/m         EXAM:  GENERAL: healthy, alert and no distress  EYES: Eyes grossly normal to inspection, PERRL and conjunctivae and sclerae normal  HENT: ear canals and TM's normal, nose and mouth without ulcers or lesions  NECK: no adenopathy, no asymmetry, masses, or scars and thyroid normal to palpation  RESP: lungs clear to auscultation - no rales, rhonchi or " wheezes  BREAST: normal without masses, tenderness or nipple discharge and no palpable axillary masses or adenopathy  CV: regular rate and rhythm, normal S1 S2, no S3 or S4, no murmur, click or rub, no peripheral edema and peripheral pulses strong  ABDOMEN: soft, nontender, no hepatosplenomegaly, no masses and bowel sounds normal   (female): normal female external genitalia, normal urethral meatus, vaginal mucosa pink, moist, well rugated, and normal cervix/adnexa/uterus without masses or discharge  MS: no gross musculoskeletal defects noted, no edema  SKIN: no suspicious lesions or rashes  NEURO: Normal strength and tone, mentation intact and speech normal  PSYCH: mentation appears normal, affect normal/bright      Lung Cancer Screening Shared Decision Making Visit     Kait Augustin is eligible for lung cancer screening on the basis of the information provided in my signed lung cancer screening order.     I have discussed with patient the risks and benefits of screening for lung cancer with low-dose CT.     The risks include:  radiation exposure: one low dose chest CT has as much ionizing radiation as about 15 chest x-rays or 6 months of background radiation living in [place]    false positives: 96% of positive findings/nodules are NOT cancer, but some might still require additional diagnostic evaluation, including biopsy  over-diagnosis: some slow growing cancers that might never have been clinically significant will be detected and treated unnecessarily     The benefit of early detection of lung cancer is contingent upon adherence to annual screening or more frequent follow up if indicated.     Furthermore, reaping the benefits of screening requires Kait Augustin to be willing and physically able to undergo diagnostic procedures, if indicated. Although no specific guide is available for determining severity of comorbidities, it is reasonable to withhold screening in patients who have greater  mortality risk from other diseases.     We did discuss that the only way to prevent lung cancer is to not smoke. Smoking cessation counseling was given, duration < 3 minutes.      I did offer risk estimation using a calculator such as this one:    ShouldIScreen        Diagnostic Test Results:  Labs reviewed in Epic  Results for orders placed or performed in visit on 10/13/20 (from the past 24 hour(s))   Lipid Profile (Chol, Trig, HDL, LDL calc)   Result Value Ref Range    Cholesterol 245 (H) <200 mg/dL    Triglycerides 86 <150 mg/dL    HDL Cholesterol 71 >49 mg/dL    LDL Cholesterol Calculated 157 (H) <100 mg/dL    Non HDL Cholesterol 174 (H) <130 mg/dL   Comprehensive metabolic panel   Result Value Ref Range    Sodium 143 133 - 144 mmol/L    Potassium 3.9 3.4 - 5.3 mmol/L    Chloride 113 (H) 94 - 109 mmol/L    Carbon Dioxide 24 20 - 32 mmol/L    Anion Gap 6 3 - 14 mmol/L    Glucose 100 (H) 70 - 99 mg/dL    Urea Nitrogen 14 7 - 30 mg/dL    Creatinine 0.68 0.52 - 1.04 mg/dL    GFR Estimate >90 >60 mL/min/[1.73_m2]    GFR Estimate If Black >90 >60 mL/min/[1.73_m2]    Calcium 8.8 8.5 - 10.1 mg/dL    Bilirubin Total 0.5 0.2 - 1.3 mg/dL    Albumin 3.8 3.4 - 5.0 g/dL    Protein Total 7.0 6.8 - 8.8 g/dL    Alkaline Phosphatase 100 40 - 150 U/L    ALT 20 0 - 50 U/L    AST 13 0 - 45 U/L   Ferritin   Result Value Ref Range    Ferritin 66 8 - 252 ng/mL   *UA reflex to Microscopic and Culture - [facility] [place]    Specimen: Midstream Urine   Result Value Ref Range    Color Urine Yellow     Appearance Urine Clear     Glucose Urine Negative NEG^Negative mg/dL    Bilirubin Urine Negative NEG^Negative    Ketones Urine Negative NEG^Negative mg/dL    Specific Gravity Urine 1.025 1.003 - 1.035    Blood Urine Negative NEG^Negative    pH Urine 6.0 5.0 - 7.0 pH    Protein Albumin Urine Negative NEG^Negative mg/dL    Urobilinogen Urine 0.2 0.2 - 1.0 EU/dL    Nitrite Urine Negative NEG^Negative    Leukocyte Esterase Urine Negative  "NEG^Negative    Source Midstream Urine          ASSESSMENT/PLAN:     1. Routine general medical examination at a health care facility  - Lipid Profile (Chol, Trig, HDL, LDL calc)  - UA reflex to Microscopic and Culture - [facility]  - Comprehensive metabolic panel  - Ferritin    2. Screening for cervical cancer  - A pap thin layer screen with  HPV - recommended age 30 - 65 years (select HPV order below)  - HPV High Risk Types DNA Cervical    3. History of bladder cancer  - UA reflex to Microscopic and Culture - [facility]  - Comprehensive metabolic panel  - Ferritin    4. Need for vaccination  - C RIV4 (FLUBLOK) VACCINE RECOMBINANT DNA PRSRV ANTIBIO FREE, IM [[id number]]    5. Personal history of tobacco use  - Prof fee: Shared Decisionmaking for Lung Cancer Screening  - CT Chest Lung Cancer Scrn Low Dose wo; Future  - nicotine (NICODERM CQ) 21 MG/24HR 24 hr patch; Place 1 patch onto the skin every 24 hours  Dispense: 30 patch; Refill: 1        Declines cologuard      Patient has been advised of split billing requirements and indicates understanding: Yes  COUNSELING:   Reviewed preventive health counseling, as reflected in patient instructions       Regular exercise       Healthy diet/nutrition       Vision screening    Estimated body mass index is 22.86 kg/m  as calculated from the following:    Height as of this encounter: 1.575 m (5' 2\").    Weight as of this encounter: 56.7 kg (125 lb).       She reports that she has been smoking cigarettes. She has a 22.50 pack-year smoking history. She has never used smokeless tobacco.     Tobacco Cessation Action Plan:   Information offered: Patient not interested at this time      Counseling Resources:  ATP IV Guidelines  Pooled Cohorts Equation Calculator  Breast Cancer Risk Calculator  BRCA-Related Cancer Risk Assessment: FHS-7 Tool  FRAX Risk Assessment  ICSI Preventive Guidelines  Dietary Guidelines for Americans, 2010  USDA's MyPlate  ASA Prophylaxis  Lung CA " Lory Phillip, CNP  [facility]

## 2020-10-12 NOTE — PATIENT INSTRUCTIONS
ASSESSMENT/PLAN:     1. Routine general medical examination at a health care facility  - Lipid Profile (Chol, Trig, HDL, LDL calc)  - UA reflex to Microscopic and Culture - [facility]  - Comprehensive metabolic panel  - Ferritin    2. Screening for cervical cancer  - A pap thin layer screen with  HPV - recommended age 30 - 65 years (select HPV order below)  - HPV High Risk Types DNA Cervical    3. History of bladder cancer  - UA reflex to Microscopic and Culture - [facility]  - Comprehensive metabolic panel  - Ferritin    4. Need for vaccination  - C RIV4 (FLUBLOK) VACCINE RECOMBINANT DNA PRSRV ANTIBIO FREE, IM [[id number]]    5. Personal history of tobacco use  - Prof fee: Shared Decisionmaking for Lung Cancer Screening  - CT Chest Lung Cancer Scrn Low Dose wo; Future  - nicotine (NICODERM CQ) 21 MG/24HR 24 hr patch; Place 1 patch onto the skin every 24 hours  Dispense: 30 patch; Refill: 1        Preventive Health Recommendations  Female Ages 50 - 64    Yearly exam: See your health care provider every year in order to  o Review health changes.   o Discuss preventive care.    o Review your medicines if your doctor has prescribed any.      Get a Pap test every three years (unless you have an abnormal result and your provider advises testing more often).    If you get Pap tests with HPV test, you only need to test every 5 years, unless you have an abnormal result.     You do not need a Pap test if your uterus was removed (hysterectomy) and you have not had cancer.    You should be tested each year for STDs (sexually transmitted diseases) if you're at risk.     Have a mammogram every 1 to 2 years.    Have a colonoscopy at age 50, or have a yearly FIT test (stool test). These exams screen for colon cancer.      Have a cholesterol test every 5 years, or more often if advised.    Have a diabetes test (fasting glucose) every three years. If you are at risk for diabetes, you should have this test more often.      If you are at risk for osteoporosis (brittle bone disease), think about having a bone density scan (DEXA).    Shots: Get a flu shot each year. Get a tetanus shot every 10 years.    Nutrition:     Eat at least 5 servings of fruits and vegetables each day.    Eat whole-grain bread, whole-wheat pasta and brown rice instead of white grains and rice.    Get adequate Calcium and Vitamin D.     Lifestyle    Exercise at least 150 minutes a week (30 minutes a day, 5 days a week). This will help you control your weight and prevent disease.    Limit alcohol to one drink per day.    No smoking.     Wear sunscreen to prevent skin cancer.     See your dentist every six months for an exam and cleaning.    See your eye doctor every 1 to 2 years.    Lung Cancer Screening   Frequently Asked Questions  If you are at high-risk for lung cancer, getting screened with low-dose computed tomography (LDCT) every year can help save your life. This handout offers answers to some of the most common questions about lung cancer screening. If you have other questions, please call [phone] ([phone]).     What is it?  Lung cancer screening uses special X-ray technology to create an image of your lung tissue. The exam is quick and easy and takes less than 10 seconds. We don t give you any medicine or use any needles. You can eat before and after the exam. You don t need to change your clothes as long as the clothing on your chest doesn t contain metal. But, you do need to be able to hold your breath for at least 6 seconds during the exam.    What is the goal of lung cancer screening?  The goal of lung cancer screening is to save lives. Many times, lung cancer is not found until a person starts having physical symptoms. Lung cancer screening can help detect lung cancer in the earliest stages when it may be easier to treat.    Who should be screened for lung cancer?  We suggest lung cancer screening for anyone who is at high-risk  for lung cancer. You are in the high-risk group if you:      are between the ages of 55 and 79, and    have smoked at least 1 pack of cigarettes a day for 30 or more years, and    still smoke or have quit within the past 15 years.    However, if you have a new cough or shortness of breath, you should talk to your doctor before being screened.    Some national lung health advocacy groups also recommend screening for people ages 50 to 79 who have smoked an average of 1 pack of cigarettes a day for 20 years. They must also have at least 1 other risk factor for lung cancer, not including exposure to secondhand smoke. Other risk factors are having had cancer in the past, emphysema, pulmonary fibrosis, COPD, a family history of lung cancer, or exposure to certain materials such as arsenic, asbestos, beryllium, cadmium, chromium, diesel fumes, nickel, radon or silica. Your care team can help you know if you have one of these risk factors.     Why does it matter if I have symptoms?  Certain symptoms can be a sign that you have a condition in your lungs that should be checked and treated by your doctor. These symptoms include fever, chest pain, a new or changing cough, shortness of breath that you have never felt before, coughing up blood or unexplained weight loss. Having any of these symptoms can greatly affect the results of lung cancer screening.       Should all smokers get an LDCT lung cancer screening exam?  It depends. Lung cancer screening is for a very specific group of men and women who have a history of heavy smoking over a long period of time (see  Who should be screened for lung cancer  above).  I am in the high-risk group, but have been diagnosed with cancer in the past. Is LDCT lung cancer screening right for me?  In some cases, you should not have LDCT lung screening, such as when your doctor is already following your cancer with CT scan studies. Your doctor will help you decide if LDCT lung screening is  right for you.  Do I need to have a screening exam every year?  Yes. If you are in the high-risk group described earlier, you should get an LDCT lung cancer screening exam every year until you are 79, or are no longer willing or able to undergo screening and possible procedures to diagnose and treat lung cancer.  How effective is LDCT at preventing death from lung cancer?  Studies have shown that LDCT lung cancer screening can lower the risk of death from lung cancer by 20 percent in people who are at high-risk.  What are the risks?  There are some risks and limitations of LDCT lung cancer screening. We want to make sure you understand the risks and benefits, so please let us know if you have any questions. Your doctor may want to talk with you more about these risks.    Radiation exposure: As with any exam that uses radiation, there is a very small increased risk of cancer. The amount of radiation in LDCT is small--about the same amount a person would get from a mammogram. Your doctor orders the exam when he or she feels the potential benefits outweigh the risks.    False negatives: No test is perfect, including LDCT. It is possible that you may have a medical condition, including lung cancer, that is not found during your exam. This is called a false negative result.    False positives and more testing: LDCT very often finds something in the lung that could be cancer, but in fact is not. This is called a false positive result. False positive tests often cause anxiety. To make sure these findings are not cancer, you may need to have more tests. These tests will be done only if you give us permission. Sometimes patients need a treatment that can have side effects, such as a biopsy. For more information on false positives, see  What can I expect from the results?     Findings not related to lung cancer: Your LDCT exam also takes pictures of areas of your body next to your lungs. In a very small number of cases, the  CT scan will show an abnormal finding in one of these areas, such as your kidneys, adrenal glands, liver or thyroid. This finding may not be serious, but you may need more tests. Your doctor can help you decide what other tests you may need, if any.  What can I expect from the results?  About 1 out of 4 LDCT exams will find something that may need more tests. Most of the time, these findings are lung nodules. Lung nodules are very small collections of tissue in the lung. These nodules are very common, and the vast majority--more than 97 percent--are not cancer (benign). Most are normal lymph nodes or small areas of scarring from past infections.  But, if a small lung nodule is found to be cancer, the cancer can be cured more than 90 percent of the time. To know if the nodule is cancer, we may need to get more images before your next yearly screening exam. If the nodule has suspicious features (for example, it is large, has an odd shape or grows over time), we will refer you to a specialist for further testing.  Will my doctor also get the results?  Yes. Your doctor will get a copy of your results.  Is it okay to keep smoking now that there s a cancer screening exam?  No. Tobacco is one of the strongest cancer-causing agents. It causes not only lung cancer, but other cancers and cardiovascular (heart) diseases as well. The damage caused by smoking builds over time. This means that the longer you smoke, the higher your risk of disease. While it is never too late to quit, the sooner you quit, the better.  Where can I find help to quit smoking?  The best way to prevent lung cancer is to stop smoking. If you have already quit smoking, congratulations and keep it up! For help on quitting smoking, please call [employer] at 8-320-884-DIBQ ([phone]) or the American [employer] Society at 1-948.500.1352 to find local resources near you.  One-on-one health coaching:  If you d prefer to work individually with a health care provider on  tobacco cessation, we offer:      Medication Therapy Management:  Our specially trained pharmacists work closely with you and your doctor to help you quit smoking.  Call [phone] or [phone] (toll free).     Can Do: Health coaching offered by [facility].  [email or web address]

## 2020-10-13 ENCOUNTER — OFFICE VISIT (OUTPATIENT)
Dept: FAMILY MEDICINE | Facility: OTHER | Age: 60
End: 2020-10-13
Attending: NURSE PRACTITIONER
Payer: COMMERCIAL

## 2020-10-13 VITALS
OXYGEN SATURATION: 98 % | SYSTOLIC BLOOD PRESSURE: 122 MMHG | DIASTOLIC BLOOD PRESSURE: 70 MMHG | HEIGHT: 62 IN | WEIGHT: 125 LBS | BODY MASS INDEX: 23 KG/M2 | TEMPERATURE: 97 F | HEART RATE: 71 BPM

## 2020-10-13 DIAGNOSIS — Z23 NEED FOR VACCINATION: ICD-10-CM

## 2020-10-13 DIAGNOSIS — Z87.891 PERSONAL HISTORY OF TOBACCO USE: ICD-10-CM

## 2020-10-13 DIAGNOSIS — Z12.4 SCREENING FOR CERVICAL CANCER: ICD-10-CM

## 2020-10-13 DIAGNOSIS — Z85.51 HISTORY OF BLADDER CANCER: ICD-10-CM

## 2020-10-13 DIAGNOSIS — Z00.00 ROUTINE GENERAL MEDICAL EXAMINATION AT A HEALTH CARE FACILITY: Primary | ICD-10-CM

## 2020-10-13 LAB
ALBUMIN SERPL-MCNC: 3.8 G/DL (ref 3.4–5)
ALBUMIN UR-MCNC: NEGATIVE MG/DL
ALP SERPL-CCNC: 100 U/L (ref 40–150)
ALT SERPL W P-5'-P-CCNC: 20 U/L (ref 0–50)
ANION GAP SERPL CALCULATED.3IONS-SCNC: 6 MMOL/L (ref 3–14)
APPEARANCE UR: CLEAR
AST SERPL W P-5'-P-CCNC: 13 U/L (ref 0–45)
BILIRUB SERPL-MCNC: 0.5 MG/DL (ref 0.2–1.3)
BILIRUB UR QL STRIP: NEGATIVE
BUN SERPL-MCNC: 14 MG/DL (ref 7–30)
CALCIUM SERPL-MCNC: 8.8 MG/DL (ref 8.5–10.1)
CHLORIDE SERPL-SCNC: 113 MMOL/L (ref 94–109)
CHOLEST SERPL-MCNC: 245 MG/DL
CO2 SERPL-SCNC: 24 MMOL/L (ref 20–32)
COLOR UR AUTO: YELLOW
CREAT SERPL-MCNC: 0.68 MG/DL (ref 0.52–1.04)
FERRITIN SERPL-MCNC: 66 NG/ML (ref 8–252)
GFR SERPL CREATININE-BSD FRML MDRD: >90 ML/MIN/{1.73_M2}
GLUCOSE SERPL-MCNC: 100 MG/DL (ref 70–99)
GLUCOSE UR STRIP-MCNC: NEGATIVE MG/DL
HDLC SERPL-MCNC: 71 MG/DL
HGB UR QL STRIP: NEGATIVE
KETONES UR STRIP-MCNC: NEGATIVE MG/DL
LDLC SERPL CALC-MCNC: 157 MG/DL
LEUKOCYTE ESTERASE UR QL STRIP: NEGATIVE
NITRATE UR QL: NEGATIVE
NONHDLC SERPL-MCNC: 174 MG/DL
PH UR STRIP: 6 PH (ref 5–7)
POTASSIUM SERPL-SCNC: 3.9 MMOL/L (ref 3.4–5.3)
PROT SERPL-MCNC: 7 G/DL (ref 6.8–8.8)
SODIUM SERPL-SCNC: 143 MMOL/L (ref 133–144)
SOURCE: NORMAL
SP GR UR STRIP: 1.02 (ref 1–1.03)
TRIGL SERPL-MCNC: 86 MG/DL
UROBILINOGEN UR STRIP-ACNC: 0.2 EU/DL (ref 0.2–1)

## 2020-10-13 PROCEDURE — 80053 COMPREHEN METABOLIC PANEL: CPT | Performed by: NURSE PRACTITIONER

## 2020-10-13 PROCEDURE — G0123 SCREEN CERV/VAG THIN LAYER: HCPCS | Performed by: NURSE PRACTITIONER

## 2020-10-13 PROCEDURE — 81003 URINALYSIS AUTO W/O SCOPE: CPT | Performed by: NURSE PRACTITIONER

## 2020-10-13 PROCEDURE — 90682 RIV4 VACC RECOMBINANT DNA IM: CPT | Performed by: NURSE PRACTITIONER

## 2020-10-13 PROCEDURE — 36415 COLL VENOUS BLD VENIPUNCTURE: CPT | Performed by: NURSE PRACTITIONER

## 2020-10-13 PROCEDURE — 90471 IMMUNIZATION ADMIN: CPT | Performed by: NURSE PRACTITIONER

## 2020-10-13 PROCEDURE — 87624 HPV HI-RISK TYP POOLED RSLT: CPT | Performed by: NURSE PRACTITIONER

## 2020-10-13 PROCEDURE — 80061 LIPID PANEL: CPT | Performed by: NURSE PRACTITIONER

## 2020-10-13 PROCEDURE — 82728 ASSAY OF FERRITIN: CPT | Performed by: NURSE PRACTITIONER

## 2020-10-13 PROCEDURE — 99396 PREV VISIT EST AGE 40-64: CPT | Mod: 25 | Performed by: NURSE PRACTITIONER

## 2020-10-13 RX ORDER — NICOTINE 21 MG/24HR
1 PATCH, TRANSDERMAL 24 HOURS TRANSDERMAL EVERY 24 HOURS
Qty: 30 PATCH | Refills: 1 | Status: SHIPPED | OUTPATIENT
Start: 2020-10-13 | End: 2021-02-22

## 2020-10-13 ASSESSMENT — ANXIETY QUESTIONNAIRES
2. NOT BEING ABLE TO STOP OR CONTROL WORRYING: MORE THAN HALF THE DAYS
5. BEING SO RESTLESS THAT IT IS HARD TO SIT STILL: NOT AT ALL
IF YOU CHECKED OFF ANY PROBLEMS ON THIS QUESTIONNAIRE, HOW DIFFICULT HAVE THESE PROBLEMS MADE IT FOR YOU TO DO YOUR WORK, TAKE CARE OF THINGS AT HOME, OR GET ALONG WITH OTHER PEOPLE: NOT DIFFICULT AT ALL
7. FEELING AFRAID AS IF SOMETHING AWFUL MIGHT HAPPEN: NOT AT ALL
6. BECOMING EASILY ANNOYED OR IRRITABLE: MORE THAN HALF THE DAYS
1. FEELING NERVOUS, ANXIOUS, OR ON EDGE: MORE THAN HALF THE DAYS
GAD7 TOTAL SCORE: 10
3. WORRYING TOO MUCH ABOUT DIFFERENT THINGS: MORE THAN HALF THE DAYS
4. TROUBLE RELAXING: MORE THAN HALF THE DAYS

## 2020-10-13 ASSESSMENT — PAIN SCALES - GENERAL: PAINLEVEL: NO PAIN (0)

## 2020-10-13 ASSESSMENT — MIFFLIN-ST. JEOR: SCORE: 1090.25

## 2020-10-13 ASSESSMENT — PATIENT HEALTH QUESTIONNAIRE - PHQ9: SUM OF ALL RESPONSES TO PHQ QUESTIONS 1-9: 7

## 2020-10-13 NOTE — LETTER
October 20, 2020      Kait LOPEZ Demetria  [address] [place] [address]        Dear ,    We are writing to inform you of your test results.    Your test results fall within the expected range(s) or remain unchanged from previous results.  Please continue with current treatment plan.    Resulted Orders   HPV High Risk Types DNA Cervical   Result Value Ref Range    HPV Source SurePath     HPV 16 DNA Negative NEG^Negative    HPV 18 DNA Negative NEG^Negative    Other HR HPV Negative NEG^Negative    Final Diagnosis This patient's sample is negative for HPV DNA.       Comment:      This test was developed and its performance characteristics determined by the   [facility]. It   has not been cleared or approved by the FDA. The laboratory is regulated under   CLIA as qualified to perform high-complexity testing. This test is used for   clinical purposes. It should not be regarded as investigational or for   research.  (Note)  METHODOLOGY:  The Roche amy 4800 system uses automated extraction,   simultaneous amplification of HPV (L1 region) and beta-globin,    followed by  real time detection of fluorescent labeled HPV and beta   globin using specific oligonucleotide probes . The test specifically   identifies types HPV 16 DNA and HPV 18 DNA while concurrently   detecting the rest of the high risk types (31, 33, 35, 39, 45, 51,   52, 56, 58, 59, 66 or 68).  COMMENTS:  This test is not intended for use as a screening device   for women under age 30 with normal cervical cytology.  Results should   be correl ated with cytologic and histologic findings. Close clinical   followup is recommended.      Specimen Description Cervical Cells    Lipid Profile (Chol, Trig, HDL, LDL calc)   Result Value Ref Range    Cholesterol 245 (H) <200 mg/dL      Comment:      Desirable:       <200 mg/dl    Triglycerides 86 <150 mg/dL      Comment:      Fasting  specimen    HDL Cholesterol 71 >49 mg/dL    LDL Cholesterol Calculated 157 (H) <100 mg/dL      Comment:      Above desirable:  100-129 mg/dl  Borderline High:  130-159 mg/dL  High:             160-189 mg/dL  Very high:       >189 mg/dl      Non HDL Cholesterol 174 (H) <130 mg/dL      Comment:      Above Desirable:  130-159 mg/dl  Borderline high:  160-189 mg/dl  High:             190-219 mg/dl  Very high:       >219 mg/dl     *UA reflex to Microscopic and Culture - [facility]   Result Value Ref Range    Color Urine Yellow     Appearance Urine Clear     Glucose Urine Negative NEG^Negative mg/dL    Bilirubin Urine Negative NEG^Negative    Ketones Urine Negative NEG^Negative mg/dL    Specific Gravity Urine 1.025 1.003 - 1.035    Blood Urine Negative NEG^Negative    pH Urine 6.0 5.0 - 7.0 pH    Protein Albumin Urine Negative NEG^Negative mg/dL    Urobilinogen Urine 0.2 0.2 - 1.0 EU/dL    Nitrite Urine Negative NEG^Negative    Leukocyte Esterase Urine Negative NEG^Negative    Source Midstream Urine    Comprehensive metabolic panel   Result Value Ref Range    Sodium 143 133 - 144 mmol/L    Potassium 3.9 3.4 - 5.3 mmol/L    Chloride 113 (H) 94 - 109 mmol/L    Carbon Dioxide 24 20 - 32 mmol/L    Anion Gap 6 3 - 14 mmol/L    Glucose 100 (H) 70 - 99 mg/dL      Comment:      Fasting specimen    Urea Nitrogen 14 7 - 30 mg/dL    Creatinine 0.68 0.52 - 1.04 mg/dL    GFR Estimate >90 >60 mL/min/[1.73_m2]      Comment:      Non  GFR Calc  Starting 12/18/2018, serum creatinine based estimated GFR (eGFR) will be   calculated using the Chronic Kidney Disease Epidemiology Collaboration   (CKD-EPI) equation.      GFR Estimate If Black >90 >60 mL/min/[1.73_m2]      Comment:       GFR Calc  Starting 12/18/2018, serum creatinine based estimated GFR (eGFR) will be   calculated using the Chronic Kidney Disease Epidemiology Collaboration   (CKD-EPI) equation.      Calcium 8.8 8.5 - 10.1 mg/dL    Bilirubin  Total 0.5 0.2 - 1.3 mg/dL    Albumin 3.8 3.4 - 5.0 g/dL    Protein Total 7.0 6.8 - 8.8 g/dL    Alkaline Phosphatase 100 40 - 150 U/L    ALT 20 0 - 50 U/L    AST 13 0 - 45 U/L   Ferritin   Result Value Ref Range    Ferritin 66 8 - 252 ng/mL   A pap thin layer screen   Result Value Ref Range    PAP NIL     Copath Report         Patient Name: LON KEYS  MR#: [id number]  Specimen #: [id number]  Collected: 10/13/2020  Received: 10/15/2020  Reported: 10/16/2020 08:55  Ordering Phy(s): ITZEL RPICE    For improved result formatting, select 'View Enhanced Report Format' under   Linked Documents section.    SPECIMEN/STAIN PROCESS:  Pap thin layer prep screening (Surepath)       Pap-Cyto x 1, HPV ordered x 1    SOURCE: Cervical, endocervical  ----------------------------------------------------------------   Pap thin layer prep screening (Surepath)  SPECIMEN ADEQUACY:  Satisfactory for evaluation.  -Transformation zone component present.    CYTOLOGIC INTERPRETATION:    Negative for intraepithelial lesion or malignancy    Electronically signed out by:  CONCETTA Hyman (ASCP)    CLINICAL HISTORY:  LMP: 06/18/2005  A previous normal pap  Date of Last Pap: 11/17/2014,    Papanicolaou Test Limitations:  Cervical cytology is a screening test with   limited sensitivity; regular  screening is critical for canc er prevention; Pap tests are primarily   effective for the diagnosis/prevention of  squamous cell carcinoma, not adenocarcinomas or other cancers.    COLLECTION SITE:  Client:  [facility]  Location: [facility] ([facility])    The technical component of this testing was completed at [facility], with the professional  component performed at [facility], [address] [place] [id number] ([phone])           If you have any questions or concerns, please call the clinic at the number listed above.       Sincerely,        Itzel Price, CNP

## 2020-10-14 ASSESSMENT — ANXIETY QUESTIONNAIRES: GAD7 TOTAL SCORE: 10

## 2020-10-16 LAB
COPATH REPORT: NORMAL
PAP: NORMAL

## 2020-10-19 ENCOUNTER — OFFICE VISIT (OUTPATIENT)
Dept: FAMILY MEDICINE | Facility: OTHER | Age: 60
End: 2020-10-19
Attending: NURSE PRACTITIONER
Payer: COMMERCIAL

## 2020-10-19 VITALS
BODY MASS INDEX: 24.18 KG/M2 | HEART RATE: 69 BPM | DIASTOLIC BLOOD PRESSURE: 68 MMHG | HEIGHT: 62 IN | WEIGHT: 131.4 LBS | OXYGEN SATURATION: 97 % | SYSTOLIC BLOOD PRESSURE: 116 MMHG

## 2020-10-19 DIAGNOSIS — L20.9 ATOPIC DERMATITIS, UNSPECIFIED TYPE: Primary | ICD-10-CM

## 2020-10-19 LAB
FINAL DIAGNOSIS: NORMAL
HPV HR 12 DNA CVX QL NAA+PROBE: NEGATIVE
HPV16 DNA SPEC QL NAA+PROBE: NEGATIVE
HPV18 DNA SPEC QL NAA+PROBE: NEGATIVE
SPECIMEN DESCRIPTION: NORMAL
SPECIMEN SOURCE CVX/VAG CYTO: NORMAL

## 2020-10-19 PROCEDURE — 99213 OFFICE O/P EST LOW 20 MIN: CPT | Performed by: NURSE PRACTITIONER

## 2020-10-19 RX ORDER — TRIAMCINOLONE ACETONIDE 1 MG/G
OINTMENT TOPICAL 2 TIMES DAILY
Qty: 30 G | Refills: 1 | Status: SHIPPED | OUTPATIENT
Start: 2020-10-19 | End: 2021-11-05

## 2020-10-19 ASSESSMENT — PAIN SCALES - GENERAL: PAINLEVEL: NO PAIN (0)

## 2020-10-19 ASSESSMENT — MIFFLIN-ST. JEOR: SCORE: 1119.28

## 2020-10-19 NOTE — PATIENT INSTRUCTIONS
Assessment & Plan     Atopic dermatitis, unspecified type  - triamcinolone (KENALOG) 0.1 % external ointment; Apply topically 2 times daily    Please call if you have any new or increased symptoms        Itzel Phillip CNP  [facility]

## 2020-10-19 NOTE — PROGRESS NOTES
Kait Augustin is a 60 year old female who presents to clinic today for the following health issues:        Rash  Onset/Duration: Saturday   Description  Location: right side by ribs  Character: raised, painful, burning, red  Itching: mild  Intensity:  mild  Progression of Symptoms:  same  Accompanying signs and symptoms:   Fever: no  Body aches or joint pain: no  Sore throat symptoms: no  Recent cold symptoms: no  History:           Previous episodes of similar rash: Previously when she had shingles  New exposures:  None  Recent travel: no  Exposure to similar rash: no  Precipitating or alleviating factors: none  Therapies tried and outcome: none      Patient Active Problem List   Diagnosis     H/O renal calculi     Malignant neoplasm of overlapping sites of bladder (H)     ACP (advance care planning)     Urothelial carcinoma of kidney, right (H)     History of bladder cancer     Bladder tumor     Past Surgical History:   Procedure Laterality Date     C LIGATE FALLOPIAN TUBE  1996     CYSTOSCOPY, RETROGRADES, INSERT STENT URETER(S), COMBINED Right 12/8/2015    Procedure: COMBINED CYSTOSCOPY, RETROGRADES, INSERT STENT URETER(S);  Surgeon: Efren Couch MD;  Location: [facility]     CYSTOSCOPY, RETROGRADES, INSERT STENT URETER(S), COMBINED Bilateral 8/4/2020    Procedure: bilateral retrograde pyelogram;  Surgeon: Delfino Walker MD;  Location: [facility]     CYSTOSCOPY, TRANSURETHRAL RESECTION (TUR) PROSTATE, COMBINED Bilateral 5/7/2019    Procedure: Transurethral Resection of Bladder Tumor, BILATERAL RETROGRADE PYELOGRAMS, RIGHT URETEROSCOPY WITH HOLMIUM LASER TUMOR ABLATION AND STENT PLACEMENT;  Surgeon: Delfino Walker MD;  Location: [facility]     CYSTOSCOPY, TRANSURETHRAL RESECTION (TUR) TUMOR BLADDER INSTILL CHEMOTHERAPY, COMBINED N/A 8/4/2020    Procedure: Transurethral resection of bladder tumor;  Surgeon: Delfino Walker MD;  Location: [facility]     CYSTOSCOPY, TRANSURETHRAL RESECTION (TUR) TUMOR BLADDER,  COMBINED N/A 2/10/2015    Procedure: COMBINED CYSTOSCOPY, TRANSURETHRAL RESECTION (TUR) TUMOR BLADDER;  Surgeon: Efren Couch MD;  Location: [facility]     CYSTOSCOPY, TRANSURETHRAL RESECTION (TUR) TUMOR BLADDER, COMBINED N/A 12/8/2015    Procedure: COMBINED CYSTOSCOPY, TRANSURETHRAL RESECTION (TUR) TUMOR BLADDER;  Surgeon: Efren Couch MD;  Location: [facility]     CYSTOSCOPY, URETEROSCOPY, COMBINED Right 5/28/2019    Procedure: Right Ureteroscopy, Fulgaration if Right iUpper Tract Urothelial Carcinoma;  Surgeon: Delfino Walker MD;  Location:  OR     CYSTOSCOPY, URETEROSCOPY, COMBINED Right 8/4/2020    Procedure: diagnostic ureteroscopy,ablation of urothelial tumor, possible fulguration through flexible ureteroscope;  Surgeon: Delfino Walker MD;  Location: [facility]       Social History     Tobacco Use     Smoking status: Current Every Day Smoker     Packs/day: 0.50     Years: 45.00     Pack years: 22.50     Types: Cigarettes     Smokeless tobacco: Never Used   Substance Use Topics     Alcohol use: Yes     Comment: rare     Family History   Problem Relation Age of Onset     C.A.D. Father         dx late 60's, CABG, angioplasty     Cancer Father         Lung, dx age 80 h/o tobacco use     Heart Disease Mother      Thyroid Disease Mother      Lipids Sister            Current Outpatient Medications   Medication Sig Dispense Refill     metroNIDAZOLE (METROGEL) 1 % external gel Apply topically daily 60 g 1     nicotine (NICODERM CQ) 21 MG/24HR 24 hr patch Place 1 patch onto the skin every 24 hours 30 patch 1     vitamin D2 (ERGOCALCIFEROL) 44037 units (1250 mcg) capsule 1 po 3 times weekly for 3 months the discontinue 36 capsule 0         Allergies   Allergen Reactions     No Known Allergies        Recent Labs   Lab Test 10/13/20  0908 08/24/20  1055 08/07/20  1119 07/28/20  1044 11/17/14  1052 11/17/14  1052   *  --   --   --   --  180*   HDL 71  --   --   --   --  65   TRIG 86  --   --   --   --   "128   ALT 20  --  20 24   < >  --    CR 0.68  --  0.98 0.68   < > 0.71   GFRESTIMATED >90  --  63 >90   < > 86   GFRESTBLACK >90  --  73 >90   < > >90  African American GFR Calc     POTASSIUM 3.9  --  3.8 4.0   < > 3.9   TSH  --  0.47  --   --   --  1.00    < > = values in this interval not displayed.        BP Readings from Last 3 Encounters:   10/19/20 116/68   10/13/20 122/70   09/30/20 118/70    Wt Readings from Last 3 Encounters:   10/19/20 59.6 kg (131 lb 6.4 oz)   10/13/20 56.7 kg (125 lb)   09/30/20 59 kg (130 lb)             Review of Systems   Constitutional, HEENT, cardiovascular, pulmonary, gi and gu systems are negative, except as otherwise noted.        Objective    /68   Pulse 69   Ht 1.575 m (5' 2\")   Wt 59.6 kg (131 lb 6.4 oz)   LMP 06/18/2005   SpO2 97%   BMI 24.03 kg/m    Body mass index is 24.03 kg/m .       Physical Exam   GENERAL: healthy, alert and no distress  NECK: no adenopathy, no asymmetry, masses, or scars and thyroid normal to palpation  RESP: lungs clear to auscultation - no rales, rhonchi or wheezes  CV: regular rate and rhythm, normal S1 S2, no S3 or S4, no murmur, click or rub, no peripheral edema and peripheral pulses strong  SKIN: There is a singular barely notable pink 2 mm circular area, non-vesicular area - right low abdomen.             Assessment & Plan     Atopic dermatitis, unspecified type  - triamcinolone (KENALOG) 0.1 % external ointment; Apply topically 2 times daily    Please call if you have any new or increased symptoms        Itzel Phillip, CNP  [facility]      "

## 2020-10-19 NOTE — NURSING NOTE
"Chief Complaint   Patient presents with     Derm Problem       Initial /68   Pulse 69   Ht 1.575 m (5' 2\")   Wt 59.6 kg (131 lb 6.4 oz)   LMP 06/18/2005   SpO2 97%   BMI 24.03 kg/m   Estimated body mass index is 24.03 kg/m  as calculated from the following:    Height as of this encounter: 1.575 m (5' 2\").    Weight as of this encounter: 59.6 kg (131 lb 6.4 oz).  Medication Reconciliation: complete  Gabriella Bain LPN  "

## 2020-10-21 DIAGNOSIS — Z85.51 HISTORY OF BLADDER CANCER: Primary | ICD-10-CM

## 2020-10-21 DIAGNOSIS — E27.8 ADRENAL MASS, LEFT (H): ICD-10-CM

## 2020-10-21 NOTE — PROGRESS NOTES
"Per last office visit  8/10/20 with Delfino Walker MD \"Plan  Follow-up in 3 months with renal ultrasound prior to cystoscopy                 -We will need to plan upper tract surveillance at some point          Orders Placed    "

## 2020-11-19 ENCOUNTER — OFFICE VISIT (OUTPATIENT)
Dept: UROLOGY | Facility: OTHER | Age: 60
End: 2020-11-19
Attending: UROLOGY
Payer: COMMERCIAL

## 2020-11-19 ENCOUNTER — HOSPITAL ENCOUNTER (OUTPATIENT)
Dept: ULTRASOUND IMAGING | Facility: OTHER | Age: 60
End: 2020-11-19
Attending: UROLOGY
Payer: COMMERCIAL

## 2020-11-19 VITALS — RESPIRATION RATE: 12 BRPM | TEMPERATURE: 96.5 F | BODY MASS INDEX: 23.52 KG/M2 | WEIGHT: 128.6 LBS | HEART RATE: 68 BPM

## 2020-11-19 DIAGNOSIS — Z85.51 HISTORY OF BLADDER CANCER: Primary | ICD-10-CM

## 2020-11-19 DIAGNOSIS — Z85.51 HISTORY OF BLADDER CANCER: ICD-10-CM

## 2020-11-19 PROCEDURE — 52000 CYSTOURETHROSCOPY: CPT | Performed by: UROLOGY

## 2020-11-19 PROCEDURE — 99213 OFFICE O/P EST LOW 20 MIN: CPT | Mod: 25 | Performed by: UROLOGY

## 2020-11-19 PROCEDURE — 76770 US EXAM ABDO BACK WALL COMP: CPT

## 2020-11-19 ASSESSMENT — PAIN SCALES - GENERAL: PAINLEVEL: NO PAIN (0)

## 2020-11-19 NOTE — PROGRESS NOTES
Preoperative diagnosis  History of bladder cancer  History of right upper tract urothelial carcinoma    Postoperative diagnosis  History of bladder cancer  History of right upper tract urothelial carcinoma    Procedure  Surveillance cystourethroscopy    Surgeon  Delfino Walker MD    Anesthesia  2% lidocaine jelly intraurethrally    Complications  None    Indications  60 year old female with a history of bladder cancer who follows up for surveillance cystoscopy    Findings  Cystoscopic findings included a normal urethra.    The bladder appeared to be normal capacity.    There were no tumors, stones or foreign bodies.    The orifices were slit-shaped and in their normal location.  The right ureteral orifice was widely patent.    Procedure  The patient was placed in supine position and prepped and draped in sterile fashion with lidocaine jelly per urethra for anesthesia.    I passed a lubricated 14F flexible cystoscope through the urethra and into the bladder and the bladder was completely visualized.  The cystoscope was retroflexed and the bladder neck visualized.    The cystoscope was slowly withdrawn while visualizing the urethra and the procedure terminated.    The patient tolerated the procedure well.      Imaging  DEVORA  11/19/2020  IMPRESSION: No solid mass or hydronephrosis.    ^^^^^^^^^^^^^^^^^^^^^^^^^^^^    CT noncontrast  8/18/2020  IMPRESSION: Benign-appearing left adrenal mass    ^^^^^^^^^^^^^^^^^^^^^^^^^^^^    CT a/p  8/7/2020  IMPRESSION:   1. Very mild soft tissue stranding around the distal  descending/proximal sigmoid colon consistent with mild diverticulitis.  No abscess or fluid collection.  2. Minimal left adrenal mass, not well characterized on a single  postcontrast enhanced exam. Follow-up noncontrast enhanced exam could  assess for fat which would indicate an adenoma.    Assessment  Ms. Augustin is a 60-year-old female with a history of right upper tract and bladder cancer who follows up today  for surveillance cystoscopy and renal ultrasound.    I reviewed the renal ultrasound with the patient -no abnormalities identified.    Discussed ongoing surveillance of her upper tract and explained that diagnostic ureteroscopy would be the gold standard providing the most information followed by a CT urogram followed by renal ultrasound followed by observation for symptomatic issues.    Following this discussion she prefers CT urogram as the surveillance test of choice for her upper tracts.    Plan  Follow-up in 3 months for surveillance cystoscopy.  Follow-up in 6 months for surveillance cystoscopy with CT urogram prior to the visit          I spent 15 minutes on this patient's visit (exclusive of separately billed services/procedures) and over half of this time was spent in face-to-face counseling regarding stone prevention:  Prevention strategies with fluids and diet, rationale for a metabolic work up, prognosis and importance of compliance.

## 2020-11-19 NOTE — PATIENT INSTRUCTIONS

## 2021-02-22 ENCOUNTER — OFFICE VISIT (OUTPATIENT)
Dept: UROLOGY | Facility: OTHER | Age: 61
End: 2021-02-22
Attending: UROLOGY
Payer: COMMERCIAL

## 2021-02-22 VITALS — WEIGHT: 127.6 LBS | BODY MASS INDEX: 23.34 KG/M2 | RESPIRATION RATE: 12 BRPM | HEART RATE: 80 BPM

## 2021-02-22 DIAGNOSIS — C67.9 RECURRENT MALIGNANT NEOPLASM OF BLADDER (H): ICD-10-CM

## 2021-02-22 DIAGNOSIS — Z85.51 HISTORY OF BLADDER CANCER: Primary | ICD-10-CM

## 2021-02-22 PROCEDURE — 99214 OFFICE O/P EST MOD 30 MIN: CPT | Mod: 25 | Performed by: UROLOGY

## 2021-02-22 PROCEDURE — 52000 CYSTOURETHROSCOPY: CPT | Performed by: UROLOGY

## 2021-02-22 RX ORDER — HYDROCODONE BITARTRATE AND ACETAMINOPHEN 5; 325 MG/1; MG/1
1-2 TABLET ORAL EVERY 4 HOURS PRN
Status: CANCELLED | OUTPATIENT
Start: 2021-02-22

## 2021-02-22 RX ORDER — CEFTRIAXONE 1 G/1
1 INJECTION, POWDER, FOR SOLUTION INTRAMUSCULAR; INTRAVENOUS
Status: CANCELLED | OUTPATIENT
Start: 2021-02-22

## 2021-02-22 ASSESSMENT — PAIN SCALES - GENERAL: PAINLEVEL: NO PAIN (0)

## 2021-02-22 NOTE — PATIENT INSTRUCTIONS

## 2021-02-22 NOTE — NURSING NOTE
Patient positioned in frog leg position prior to Delfino Walker MD prepping patient with chlorhexidine gluconate cleanser.    [place] Protocol    A. Pre-procedure verification complete Yes   1-relevant information / documentation available, reviewed and properly matched to the patient; 2-consent accurate and complete, 3-equipment and supplies available    B. Site marking complete N/A  Site marked if not in continuous attendance with patient    C. TIME OUT completed Yes  Time Out was conducted just prior to starting procedure to verify the eight required elements: 1-patient identity, 2-consent accurate and complete, 3-position, 4-correct side/site marked (if applicable), 5-procedure, 6-relevant images / results properly labeled and displayed (if applicable), 7-antibiotics / irrigation fluids (if applicable), 8-safety precautions.    After procedure perineum area rinsed. Discharge instructions reviewed with patient. Patient verbalized understanding of discharge instructions and discharged ambulatory.  Mariza Rubin RN..................2/22/2021  9:59 AM

## 2021-02-22 NOTE — PROGRESS NOTES
Preoperative diagnosis  History of bladder cancer  History of right upper tract urothelial carcinoma    Postoperative diagnosis  History of bladder cancer  History of right upper tract urothelial carcinoma  Recurrent bladder tumor    Procedure  Surveillance cystourethroscopy    Surgeon  Delfino Walker MD    Anesthesia  2% lidocaine jelly intraurethrally    Complications  None    Indications  60 year old female with a history of bladder cancer who follows up for surveillance cystoscopy    Pathology  I personally reviewed the pathology report  5/7/2019  High grade Ta    5/7/2019  Right upper tract with low grade Ta urothelial tumor which was ablated (no biopsy sent)    Findings  Cystoscopic findings included a normal urethra.    The bladder appeared to be normal capacity.    There were no stones or foreign bodies.    There was evidence of multifocal bladder tumor recurrence at the dome and near the right ureteral orifice.  The tumors were papillary and flat.  The orifices were slit-shaped and in their normal location.  The right ureteral orifice was widely patent.    Procedure  The patient was placed in supine position and prepped and draped in sterile fashion with lidocaine jelly per urethra for anesthesia.    I passed a lubricated 14F flexible cystoscope through the urethra and into the bladder and the bladder was completely visualized.  The cystoscope was retroflexed and the bladder neck visualized.    The cystoscope was slowly withdrawn while visualizing the urethra and the procedure terminated.    The patient tolerated the procedure well.      Imaging  DEVORA  11/19/2020  IMPRESSION: No solid mass or hydronephrosis.    ^^^^^^^^^^^^^^^^^^^^^^^^^^^^    CT noncontrast  8/18/2020  IMPRESSION: Benign-appearing left adrenal mass    ^^^^^^^^^^^^^^^^^^^^^^^^^^^^    CT a/p  8/7/2020  IMPRESSION:   1. Very mild soft tissue stranding around the distal  descending/proximal sigmoid colon consistent with mild diverticulitis.  No  abscess or fluid collection.  2. Minimal left adrenal mass, not well characterized on a single  postcontrast enhanced exam. Follow-up noncontrast enhanced exam could  assess for fat which would indicate an adenoma.    Assessment  Ms. Augustin is a 60-year-old female with a history of right upper tract and bladder cancer who follows up today for surveillance cystoscopy revealing multifocal recurrent bladder tumors at the dome and near the right ureteral orifice.    I recommended formal resection of the bladder tumor identified on cystoscopy.  I explained that this is a procedure performed in the operating room with anesthesia.  I explained the risks, benefits and alternatives to the procedure.  Specifically I discussed the potential for bladder perforation which could lead to conversion to open abdominal surgery to repair the bladder.  I also discussed that when tumors are close to the ureteral orifice there is a chance I may need to leave a stent during the period of healing.    All patient's questions were answered and the patient was consented for the procedure listed below.     Plan  The patient was consented for transurethral resection of bladder tumor, bilateral retrograde pyelograms, possible stent/perioperative gemcitabine   26 F resectoscope   Muscle paralysis is not requested during anesthesia due to the size/location of tumor.   Preop H&P per PCP for perioperative medication management is appreciated            I spent 30 minutes on this patient's visit (exclusive of separately billed services/procedures) and over half of this time was spent in face-to-face counseling regarding stone prevention:  Prevention strategies with fluids and diet, rationale for a metabolic work up, prognosis and importance of compliance.

## 2021-02-24 NOTE — PROGRESS NOTES
[facility]  [address]  [facility] [address]  Phone: [phone]  Primary Provider: Itzel Phillip  {FV AMB Performing Provider (Optional):740009}      PREOPERATIVE EVALUATION:  Today's date: 3/3/2021    Kait Augustin is a 60 year old female who presents for a preoperative evaluation.    Surgical Information:  Surgery/Procedure: Transurethral resection of bladder tumor  Surgery Location: [facility]  Surgeon: Dr. Walker  Surgery Date: 3/9/21  Time of Surgery: tbd  Where patient plans to recover: At home with family  Fax number for surgical facility: Note does not need to be faxed, will be available electronically in Epic.    Type of Anesthesia Anticipated: to be determined    {2021 Provider Charting Preference for Preop :306523}    Subjective     HPI related to upcoming procedure: ***      Preop Questions 3/3/2021   1. Have you ever had a heart attack or stroke? No   2. Have you ever had surgery on your heart or blood vessels, such as a stent placement, a coronary artery bypass, or surgery on an artery in your head, neck, heart, or legs? No   3. Do you have chest pain with activity? No   4. Do you have a history of  heart failure? No   5. Do you currently have a cold, bronchitis or symptoms of other infection? No   6. Do you have a cough, shortness of breath, or wheezing? No   7. Do you or anyone in your family have previous history of blood clots? No   8. Do you or does anyone in your family have a serious bleeding problem such as prolonged bleeding following surgeries or cuts? No   9. Have you ever had problems with anemia or been told to take iron pills? YES - during pregnancy   10. Have you had any abnormal blood loss such as black, tarry or bloody stools, or abnormal vaginal bleeding? No   11. Have you ever had a blood transfusion? No   12. Are you willing to have a blood transfusion if it is medically needed before, during, or after your surgery? Yes   13. Have you  or any of your relatives ever had problems with anesthesia? No   14. Do you have sleep apnea, excessive snoring or daytime drowsiness? No   15. Do you have any artifical heart valves or other implanted medical devices like a pacemaker, defibrillator, or continuous glucose monitor? No   16. Do you have artificial joints? No   17. Are you allergic to latex? No   18. Is there any chance that you may be pregnant? No     Health Care Directive:  Patient does not have a Health Care Directive or Living Will: Discussed advance care planning with patient; however, patient declined at this time.    Preoperative Review of :  {Mnpmpreport:503391}  {Review MNPMP for all patients per [facility] MNPMP Profile:678450}    {Chronic problem details (Optional) :301569}    Review of Systems  {ROS Preop Choices:612932}    Patient Active Problem List    Diagnosis Date Noted     Recurrent malignant neoplasm of bladder (H) 02/22/2021     Priority: Medium     Added automatically from request for surgery [id number]       Bladder tumor 07/23/2020     Priority: Medium     Added automatically from request for surgery [id number]       Urothelial carcinoma of kidney, right (H) 06/03/2019     Priority: Medium     History of bladder cancer 06/03/2019     Priority: Medium     ACP (advance care planning) 12/13/2016     Priority: Medium     Advance Care Planning 12/13/2016: ACP Review of Chart / Resources Provided:  Reviewed chart for advance care plan.  Kait JOHN Augustin has no plan or code status on file. Discussed available resources and provided with information. Confirmed code status reflects current choices pending further ACP discussions.  Confirmed/documented legally designated decision makers.  Added by Radha Parekh             Malignant neoplasm of overlapping sites of bladder (H) 12/22/2015     Priority: Medium     H/O renal calculi 11/17/2014     Priority: Medium      Past Medical History:   Diagnosis Date     Cancer (H)     Bladder Cancer      H/O renal calculi 11/17/2014     MIGRAINE HEADACHES 1996     Nicotine dependence 11/17/2014     PONV (postoperative nausea and vomiting)      Tobacco abuse 11/17/2014     Past Surgical History:   Procedure Laterality Date     C LIGATE FALLOPIAN TUBE  1996     CYSTOSCOPY, RETROGRADES, INSERT STENT URETER(S), COMBINED Right 12/8/2015    Procedure: COMBINED CYSTOSCOPY, RETROGRADES, INSERT STENT URETER(S);  Surgeon: Efren Couch MD;  Location: [facility]     CYSTOSCOPY, RETROGRADES, INSERT STENT URETER(S), COMBINED Bilateral 8/4/2020    Procedure: bilateral retrograde pyelogram;  Surgeon: Delfino Walker MD;  Location: [facility] OR     CYSTOSCOPY, TRANSURETHRAL RESECTION (TUR) PROSTATE, COMBINED Bilateral 5/7/2019    Procedure: Transurethral Resection of Bladder Tumor, BILATERAL RETROGRADE PYELOGRAMS, RIGHT URETEROSCOPY WITH HOLMIUM LASER TUMOR ABLATION AND STENT PLACEMENT;  Surgeon: Delfino Walker MD;  Location: [facility] OR     CYSTOSCOPY, TRANSURETHRAL RESECTION (TUR) TUMOR BLADDER INSTILL CHEMOTHERAPY, COMBINED N/A 8/4/2020    Procedure: Transurethral resection of bladder tumor;  Surgeon: Delfino Walker MD;  Location: [facility] OR     CYSTOSCOPY, TRANSURETHRAL RESECTION (TUR) TUMOR BLADDER, COMBINED N/A 2/10/2015    Procedure: COMBINED CYSTOSCOPY, TRANSURETHRAL RESECTION (TUR) TUMOR BLADDER;  Surgeon: Efren Couch MD;  Location: [facility]     CYSTOSCOPY, TRANSURETHRAL RESECTION (TUR) TUMOR BLADDER, COMBINED N/A 12/8/2015    Procedure: COMBINED CYSTOSCOPY, TRANSURETHRAL RESECTION (TUR) TUMOR BLADDER;  Surgeon: Efren Couch MD;  Location: [facility]     CYSTOSCOPY, URETEROSCOPY, COMBINED Right 5/28/2019    Procedure: Right Ureteroscopy, Fulgaration if Right iUpper Tract Urothelial Carcinoma;  Surgeon: Delfino Walker MD;  Location: [facility]     CYSTOSCOPY, URETEROSCOPY, COMBINED Right 8/4/2020    Procedure: diagnostic ureteroscopy,ablation of urothelial tumor, possible fulguration through flexible ureteroscope;   "Surgeon: Delfino Walker MD;  Location:  OR     Current Outpatient Medications   Medication Sig Dispense Refill     metroNIDAZOLE (METROGEL) 1 % external gel Apply topically daily 60 g 1     triamcinolone (KENALOG) 0.1 % external ointment Apply topically 2 times daily 30 g 1       Allergies   Allergen Reactions     No Known Allergies         Social History     Tobacco Use     Smoking status: Current Every Day Smoker     Packs/day: 0.50     Years: 45.00     Pack years: 22.50     Types: Cigarettes     Smokeless tobacco: Never Used   Substance Use Topics     Alcohol use: Yes     Comment: rare     {FAMILY HISTORY (Optional):743025876}  History   Drug Use No         Objective     LMP 2005     Physical Exam  {EXAM Preop Choices:027773}    Recent Labs   Lab Test 10/13/20  0908 20  1119 20  1044   HGB  --  13.3 14.4   PLT  --  195 227    139 141   POTASSIUM 3.9 3.8 4.0   CR 0.68 0.98 0.68        Diagnostics:  {LABS:622319}   {EK}    Revised Cardiac Risk Index (RCRI):  The patient has the following serious cardiovascular risks for perioperative complications:  {PREOP REVISED CARDIAC RISK INDEX (RCRI) :025714::\" - No serious cardiac risks = 0 points\"}     RCRI Interpretation: {REVISED CARDIAC RISK INTERPRETATION :626860}    {Provider  Link to PREOP SmartSet  Includes common orders; guidelines for anemia, warfarin, additional testing; patient instructions  :932886}       Signed Electronically by: Itzel Phillip CNP  Copy of this evaluation report is provided to requesting physician.    [facility] Preop Guidelines    Revised Cardiac Risk Index   "

## 2021-03-03 ENCOUNTER — OFFICE VISIT (OUTPATIENT)
Dept: FAMILY MEDICINE | Facility: OTHER | Age: 61
End: 2021-03-03
Attending: NURSE PRACTITIONER
Payer: COMMERCIAL

## 2021-03-03 VITALS
OXYGEN SATURATION: 98 % | WEIGHT: 131 LBS | HEIGHT: 62 IN | BODY MASS INDEX: 24.11 KG/M2 | SYSTOLIC BLOOD PRESSURE: 110 MMHG | DIASTOLIC BLOOD PRESSURE: 62 MMHG | TEMPERATURE: 97.7 F | HEART RATE: 84 BPM

## 2021-03-03 DIAGNOSIS — Z53.9 ERRONEOUS ENCOUNTER--DISREGARD: ICD-10-CM

## 2021-03-03 DIAGNOSIS — D49.4 BLADDER TUMOR: ICD-10-CM

## 2021-03-03 DIAGNOSIS — Z01.818 PRE-OP EXAM: Primary | ICD-10-CM

## 2021-03-03 LAB
BASOPHILS # BLD AUTO: 0.1 10E9/L (ref 0–0.2)
BASOPHILS NFR BLD AUTO: 0.6 %
DIFFERENTIAL METHOD BLD: NORMAL
EOSINOPHIL # BLD AUTO: 0.2 10E9/L (ref 0–0.7)
EOSINOPHIL NFR BLD AUTO: 1.8 %
ERYTHROCYTE [DISTWIDTH] IN BLOOD BY AUTOMATED COUNT: 13.2 % (ref 10–15)
HCT VFR BLD AUTO: 39.2 % (ref 35–47)
HGB BLD-MCNC: 13.3 G/DL (ref 11.7–15.7)
LYMPHOCYTES # BLD AUTO: 2.8 10E9/L (ref 0.8–5.3)
LYMPHOCYTES NFR BLD AUTO: 31.4 %
MCH RBC QN AUTO: 31.8 PG (ref 26.5–33)
MCHC RBC AUTO-ENTMCNC: 33.9 G/DL (ref 31.5–36.5)
MCV RBC AUTO: 94 FL (ref 78–100)
MONOCYTES # BLD AUTO: 0.9 10E9/L (ref 0–1.3)
MONOCYTES NFR BLD AUTO: 9.4 %
NEUTROPHILS # BLD AUTO: 5.1 10E9/L (ref 1.6–8.3)
NEUTROPHILS NFR BLD AUTO: 56.8 %
PLATELET # BLD AUTO: 247 10E9/L (ref 150–450)
RBC # BLD AUTO: 4.18 10E12/L (ref 3.8–5.2)
WBC # BLD AUTO: 9 10E9/L (ref 4–11)

## 2021-03-03 PROCEDURE — 85025 COMPLETE CBC W/AUTO DIFF WBC: CPT | Performed by: NURSE PRACTITIONER

## 2021-03-03 PROCEDURE — 80053 COMPREHEN METABOLIC PANEL: CPT | Performed by: NURSE PRACTITIONER

## 2021-03-03 PROCEDURE — 36415 COLL VENOUS BLD VENIPUNCTURE: CPT | Performed by: NURSE PRACTITIONER

## 2021-03-03 RX ORDER — ERGOCALCIFEROL 1.25 MG/1
CAPSULE, LIQUID FILLED ORAL
COMMUNITY
Start: 2020-09-21 | End: 2021-03-05

## 2021-03-03 ASSESSMENT — PAIN SCALES - GENERAL: PAINLEVEL: NO PAIN (0)

## 2021-03-03 ASSESSMENT — MIFFLIN-ST. JEOR: SCORE: 1117.46

## 2021-03-03 NOTE — NURSING NOTE
"Chief Complaint   Patient presents with     Pre-Op Exam       Initial /62 (BP Location: Right arm, Patient Position: Chair, Cuff Size: Adult Regular)   Pulse 84   Temp 97.7  F (36.5  C) (Tympanic)   Ht 1.575 m (5' 2\")   Wt 59.4 kg (131 lb)   LMP 06/18/2005   SpO2 98%   BMI 23.96 kg/m   Estimated body mass index is 23.96 kg/m  as calculated from the following:    Height as of this encounter: 1.575 m (5' 2\").    Weight as of this encounter: 59.4 kg (131 lb).  Medication Reconciliation: complete  Anum Muir LPN    "

## 2021-03-03 NOTE — PROGRESS NOTES
[facility]  [address]  [facility] [address]  Phone: [phone]  Primary Provider: Itzel Phillip  Pre-op Performing Provider: СВЕТЛАНА IDCKERSON      PREOPERATIVE EVALUATION:  Today's date: 3/3/2021    Kait Augustin is a 60 year old female who presents for a preoperative evaluation.    Surgical Information:  Surgery/Procedure: Transurethral resection of bladder tumor  Surgery Location: [facility]  Surgeon: Dr. Walker  Surgery Date: 3/9/21  Time of Surgery: tbd  Where patient plans to recover: At home with family  Fax number for surgical facility: Note does not need to be faxed, will be available electronically in Epic.    Type of Anesthesia Anticipated: to be determined    Assessment & Plan     The proposed surgical procedure is considered INTERMEDIATE risk.    1. Pre-op exam    2. Bladder tumor    3. Malignant neoplasm of overlapping sites of bladder (H)    4. Recurrent malignant neoplasm of bladder (H)    5. Urothelial carcinoma of kidney, right (H)             Risks and Recommendations:  The patient has the following additional risks and recommendations for perioperative complications:   - No identified additional risk factors other than previously addressed    Medication Instructions:  Patient is to take all scheduled medications on the day of surgery    RECOMMENDATION:  APPROVAL GIVEN to proceed with proposed procedure, without further diagnostic evaluation.    Labs from 3/3/21 reviewed.     Subjective     HPI related to upcoming procedure: First started 7 years ago initially with blood in the urine and was diagnosed with bladder cancer and she had multiple surgeries to remove any tumors. She denies any symptoms currently.         Preop Questions 3/3/2021   1. Have you ever had a heart attack or stroke? No   2. Have you ever had surgery on your heart or blood vessels, such as a stent placement, a coronary artery bypass, or surgery on an artery in your head, neck,  heart, or legs? No   3. Do you have chest pain with activity? No   4. Do you have a history of  heart failure? No   5. Do you currently have a cold, bronchitis or symptoms of other infection? No   6. Do you have a cough, shortness of breath, or wheezing? No   7. Do you or anyone in your family have previous history of blood clots? No   8. Do you or does anyone in your family have a serious bleeding problem such as prolonged bleeding following surgeries or cuts? No   9. Have you ever had problems with anemia or been told to take iron pills? YES - during pregnancy   10. Have you had any abnormal blood loss such as black, tarry or bloody stools, or abnormal vaginal bleeding? No   11. Have you ever had a blood transfusion? No   12. Are you willing to have a blood transfusion if it is medically needed before, during, or after your surgery? Yes   13. Have you or any of your relatives ever had problems with anesthesia? No   14. Do you have sleep apnea, excessive snoring or daytime drowsiness? No   15. Do you have any artifical heart valves or other implanted medical devices like a pacemaker, defibrillator, or continuous glucose monitor? No   16. Do you have artificial joints? No   17. Are you allergic to latex? No   18. Is there any chance that you may be pregnant? No     Health Care Directive:  Patient does not have a Health Care Directive or Living Will: Discussed advance care planning with patient; however, patient declined at this time.    Preoperative Review of :   reviewed - no record of controlled substances prescribed.    Status of Chronic Conditions:  See problem list for active medical problems.  Problems all longstanding and stable, except as noted/documented.  See ROS for pertinent symptoms related to these conditions.      Review of Systems  CONSTITUTIONAL: NEGATIVE for fever, chills, change in weight  INTEGUMENTARY/SKIN: NEGATIVE for worrisome rashes, moles or lesions  EYES: NEGATIVE for vision changes  or irritation  ENT/MOUTH: NEGATIVE for ear, mouth and throat problems  RESP: NEGATIVE for significant cough or SOB  CV: NEGATIVE for chest pain, palpitations or peripheral edema  GI: NEGATIVE for nausea, abdominal pain, heartburn, or change in bowel habits  : NEGATIVE for current frequency, dysuria, or hematuria. OTHERWISE as noted in problem list.  MUSCULOSKELETAL: NEGATIVE for significant arthralgias or myalgia  NEURO: NEGATIVE for weakness, dizziness or paresthesias  ENDOCRINE: NEGATIVE for temperature intolerance, skin/hair changes  HEME: NEGATIVE for bleeding problems  PSYCHIATRIC: NEGATIVE for changes in mood or affect    Patient Active Problem List    Diagnosis Date Noted     Recurrent malignant neoplasm of bladder (H) 02/22/2021     Priority: Medium     Added automatically from request for surgery [id number]       Bladder tumor 07/23/2020     Priority: Medium     Added automatically from request for surgery [id number]       Urothelial carcinoma of kidney, right (H) 06/03/2019     Priority: Medium     History of bladder cancer 06/03/2019     Priority: Medium     ACP (advance care planning) 12/13/2016     Priority: Medium     Advance Care Planning 12/13/2016: ACP Review of Chart / Resources Provided:  Reviewed chart for advance care plan.  Kait Augustin has no plan or code status on file. Discussed available resources and provided with information. Confirmed code status reflects current choices pending further ACP discussions.  Confirmed/documented legally designated decision makers.  Added by Radha Parekh             Malignant neoplasm of overlapping sites of bladder (H) 12/22/2015     Priority: Medium     H/O renal calculi 11/17/2014     Priority: Medium      Past Medical History:   Diagnosis Date     Cancer (H)     Bladder Cancer     H/O renal calculi 11/17/2014     MIGRAINE HEADACHES 1996     Nicotine dependence 11/17/2014     PONV (postoperative nausea and vomiting)      Tobacco abuse 11/17/2014      Past Surgical History:   Procedure Laterality Date     C LIGATE FALLOPIAN TUBE  1996     CYSTOSCOPY, RETROGRADES, INSERT STENT URETER(S), COMBINED Right 12/8/2015    Procedure: COMBINED CYSTOSCOPY, RETROGRADES, INSERT STENT URETER(S);  Surgeon: Efren Couch MD;  Location: [facility]     CYSTOSCOPY, RETROGRADES, INSERT STENT URETER(S), COMBINED Bilateral 8/4/2020    Procedure: bilateral retrograde pyelogram;  Surgeon: Delfino Walker MD;  Location: [facility] OR     CYSTOSCOPY, TRANSURETHRAL RESECTION (TUR) PROSTATE, COMBINED Bilateral 5/7/2019    Procedure: Transurethral Resection of Bladder Tumor, BILATERAL RETROGRADE PYELOGRAMS, RIGHT URETEROSCOPY WITH HOLMIUM LASER TUMOR ABLATION AND STENT PLACEMENT;  Surgeon: Delfino Walker MD;  Location: [facility] OR     CYSTOSCOPY, TRANSURETHRAL RESECTION (TUR) TUMOR BLADDER INSTILL CHEMOTHERAPY, COMBINED N/A 8/4/2020    Procedure: Transurethral resection of bladder tumor;  Surgeon: Delfino Walker MD;  Location: [facility]     CYSTOSCOPY, TRANSURETHRAL RESECTION (TUR) TUMOR BLADDER, COMBINED N/A 2/10/2015    Procedure: COMBINED CYSTOSCOPY, TRANSURETHRAL RESECTION (TUR) TUMOR BLADDER;  Surgeon: Efren Couch MD;  Location: [facility]     CYSTOSCOPY, TRANSURETHRAL RESECTION (TUR) TUMOR BLADDER, COMBINED N/A 12/8/2015    Procedure: COMBINED CYSTOSCOPY, TRANSURETHRAL RESECTION (TUR) TUMOR BLADDER;  Surgeon: Efren Couch MD;  Location: [facility]     CYSTOSCOPY, URETEROSCOPY, COMBINED Right 5/28/2019    Procedure: Right Ureteroscopy, Fulgaration if Right iUpper Tract Urothelial Carcinoma;  Surgeon: Delfino Walker MD;  Location: [facility]     CYSTOSCOPY, URETEROSCOPY, COMBINED Right 8/4/2020    Procedure: diagnostic ureteroscopy,ablation of urothelial tumor, possible fulguration through flexible ureteroscope;  Surgeon: Delfino Walker MD;  Location:  OR     Current Outpatient Medications   Medication Sig Dispense Refill     metroNIDAZOLE (METROGEL) 1 % external gel Apply  topically daily 60 g 1     triamcinolone (KENALOG) 0.1 % external ointment Apply topically 2 times daily 30 g 1       Allergies   Allergen Reactions     No Known Allergies         Social History     Tobacco Use     Smoking status: Current Every Day Smoker     Packs/day: 0.50     Years: 45.00     Pack years: 22.50     Types: Cigarettes     Smokeless tobacco: Never Used   Substance Use Topics     Alcohol use: Yes     Comment: rare     Family History   Problem Relation Age of Onset     C.A.D. Father         dx late 60's, CABG, angioplasty     Cancer Father         Lung, dx age 80 h/o tobacco use     Heart Disease Mother      Thyroid Disease Mother      Lipids Sister      History   Drug Use No         Objective     LMP 06/18/2005     Physical Exam    GENERAL APPEARANCE: healthy, alert and no distress     EYES: EOMI, PERRL     HENT: ear canals and TM's normal and nose and mouth without ulcers or lesions     NECK: no adenopathy, no asymmetry, masses, or scars and thyroid normal to palpation     RESP: lungs clear to auscultation - no rales, rhonchi or wheezes     CV: regular rates and rhythm, normal S1 S2, no S3 or S4 and no murmur, click or rub     ABDOMEN:  soft, nontender, no HSM or masses and bowel sounds normal     MS: extremities normal- no gross deformities noted, no evidence of inflammation in joints, FROM in all extremities.     SKIN: no suspicious lesions or rashes     NEURO: Normal strength and tone, sensory exam grossly normal, mentation intact and speech normal     PSYCH: mentation appears normal. and affect normal/bright     LYMPHATICS: No cervical adenopathy    Recent Labs   Lab Test 10/13/20  0908 08/07/20  1119 07/28/20  1044   HGB  --  13.3 14.4   PLT  --  195 227    139 141   POTASSIUM 3.9 3.8 4.0   CR 0.68 0.98 0.68        Diagnostics:  Component      Latest Ref Rng & Units 3/3/2021   WBC      4.0 - 11.0 10e9/L 9.0   RBC Count      3.8 - 5.2 10e12/L 4.18   Hemoglobin      11.7 - 15.7 g/dL 13.3    Hematocrit      35.0 - 47.0 % 39.2   MCV      78 - 100 fl 94   MCH      26.5 - 33.0 pg 31.8   MCHC      31.5 - 36.5 g/dL 33.9   RDW      10.0 - 15.0 % 13.2   Platelet Count      150 - 450 10e9/L 247   % Neutrophils      % 56.8   % Lymphocytes      % 31.4   % Monocytes      % 9.4   % Eosinophils      % 1.8   % Basophils      % 0.6   Absolute Neutrophil      1.6 - 8.3 10e9/L 5.1   Absolute Lymphocytes      0.8 - 5.3 10e9/L 2.8   Absolute Monocytes      0.0 - 1.3 10e9/L 0.9   Absolute Eosinophils      0.0 - 0.7 10e9/L 0.2   Absolute Basophils      0.0 - 0.2 10e9/L 0.1   Diff Method       Automated Method   Sodium      133 - 144 mmol/L 140   Potassium      3.4 - 5.3 mmol/L 3.7   Chloride      94 - 109 mmol/L 107   Carbon Dioxide      20 - 32 mmol/L 28   Anion Gap      3 - 14 mmol/L 5   Glucose      70 - 99 mg/dL 104 (H)   Urea Nitrogen      7 - 30 mg/dL 17   Creatinine      0.52 - 1.04 mg/dL 0.83   GFR Estimate      >60 mL/min/1.73:m2 77   GFR Estimate If Black      >60 mL/min/1.73:m2 89   Calcium      8.5 - 10.1 mg/dL 9.4   Bilirubin Total      0.2 - 1.3 mg/dL 0.6   Albumin      3.4 - 5.0 g/dL 4.3   Protein Total      6.8 - 8.8 g/dL 7.3   Alkaline Phosphatase      40 - 150 U/L 121   ALT      0 - 50 U/L 28   AST      0 - 45 U/L 20     Results for orders placed or performed in visit on 03/05/21   *UA reflex to Microscopic and Culture - [facility]     Status: None    Specimen: Midstream Urine   Result Value Ref Range    Color Urine Yellow     Appearance Urine Clear     Glucose Urine Negative NEG^Negative mg/dL    Bilirubin Urine Negative NEG^Negative    Ketones Urine Negative NEG^Negative mg/dL    Specific Gravity Urine >1.030 1.003 - 1.035    Blood Urine Negative NEG^Negative    pH Urine 5.5 5.0 - 7.0 pH    Protein Albumin Urine Negative NEG^Negative mg/dL    Urobilinogen Urine 0.2 0.2 - 1.0 EU/dL    Nitrite Urine Negative NEG^Negative    Leukocyte Esterase Urine Negative NEG^Negative    Source Midstream Urine       COVID PCR pending.     EKG: appears normal, NSR, normal axis, normal intervals, no acute ST/T changes c/w ischemia, no LVH by voltage criteria, unchanged from previous tracings    Revised Cardiac Risk Index (RCRI):  The patient has the following serious cardiovascular risks for perioperative complications:   - No serious cardiac risks = 0 points     RCRI Interpretation: 0 points: Class I (very low risk - 0.4% complication rate)         Signed Electronically by: AISSATOU Ritter CNP    Copy of this evaluation report is provided to requesting physician.    Formerly [facility], later [facility] Preop Guidelines    Revised Cardiac Risk Index

## 2021-03-03 NOTE — H&P (VIEW-ONLY)
[facility]  [address]  [facility] [address]  Phone: [phone]  Primary Provider: Itzel Phillip  Pre-op Performing Provider: СВЕТЛАНА DICKERSON      PREOPERATIVE EVALUATION:  Today's date: 3/3/2021    Kait Augustin is a 60 year old female who presents for a preoperative evaluation.    Surgical Information:  Surgery/Procedure: Transurethral resection of bladder tumor  Surgery Location: [facility]  Surgeon: Dr. Walker  Surgery Date: 3/9/21  Time of Surgery: tbd  Where patient plans to recover: At home with family  Fax number for surgical facility: Note does not need to be faxed, will be available electronically in Epic.    Type of Anesthesia Anticipated: to be determined    Assessment & Plan     The proposed surgical procedure is considered INTERMEDIATE risk.    1. Pre-op exam    2. Bladder tumor    3. Malignant neoplasm of overlapping sites of bladder (H)    4. Recurrent malignant neoplasm of bladder (H)    5. Urothelial carcinoma of kidney, right (H)             Risks and Recommendations:  The patient has the following additional risks and recommendations for perioperative complications:   - No identified additional risk factors other than previously addressed    Medication Instructions:  Patient is to take all scheduled medications on the day of surgery    RECOMMENDATION:  APPROVAL GIVEN to proceed with proposed procedure, without further diagnostic evaluation.    Labs from 3/3/21 reviewed.     Subjective     HPI related to upcoming procedure: First started 7 years ago initially with blood in the urine and was diagnosed with bladder cancer and she had multiple surgeries to remove any tumors. She denies any symptoms currently.         Preop Questions 3/3/2021   1. Have you ever had a heart attack or stroke? No   2. Have you ever had surgery on your heart or blood vessels, such as a stent placement, a coronary artery bypass, or surgery on an artery in your head, neck,  heart, or legs? No   3. Do you have chest pain with activity? No   4. Do you have a history of  heart failure? No   5. Do you currently have a cold, bronchitis or symptoms of other infection? No   6. Do you have a cough, shortness of breath, or wheezing? No   7. Do you or anyone in your family have previous history of blood clots? No   8. Do you or does anyone in your family have a serious bleeding problem such as prolonged bleeding following surgeries or cuts? No   9. Have you ever had problems with anemia or been told to take iron pills? YES - during pregnancy   10. Have you had any abnormal blood loss such as black, tarry or bloody stools, or abnormal vaginal bleeding? No   11. Have you ever had a blood transfusion? No   12. Are you willing to have a blood transfusion if it is medically needed before, during, or after your surgery? Yes   13. Have you or any of your relatives ever had problems with anesthesia? No   14. Do you have sleep apnea, excessive snoring or daytime drowsiness? No   15. Do you have any artifical heart valves or other implanted medical devices like a pacemaker, defibrillator, or continuous glucose monitor? No   16. Do you have artificial joints? No   17. Are you allergic to latex? No   18. Is there any chance that you may be pregnant? No     Health Care Directive:  Patient does not have a Health Care Directive or Living Will: Discussed advance care planning with patient; however, patient declined at this time.    Preoperative Review of :   reviewed - no record of controlled substances prescribed.    Status of Chronic Conditions:  See problem list for active medical problems.  Problems all longstanding and stable, except as noted/documented.  See ROS for pertinent symptoms related to these conditions.      Review of Systems  CONSTITUTIONAL: NEGATIVE for fever, chills, change in weight  INTEGUMENTARY/SKIN: NEGATIVE for worrisome rashes, moles or lesions  EYES: NEGATIVE for vision changes  or irritation  ENT/MOUTH: NEGATIVE for ear, mouth and throat problems  RESP: NEGATIVE for significant cough or SOB  CV: NEGATIVE for chest pain, palpitations or peripheral edema  GI: NEGATIVE for nausea, abdominal pain, heartburn, or change in bowel habits  : NEGATIVE for current frequency, dysuria, or hematuria. OTHERWISE as noted in problem list.  MUSCULOSKELETAL: NEGATIVE for significant arthralgias or myalgia  NEURO: NEGATIVE for weakness, dizziness or paresthesias  ENDOCRINE: NEGATIVE for temperature intolerance, skin/hair changes  HEME: NEGATIVE for bleeding problems  PSYCHIATRIC: NEGATIVE for changes in mood or affect    Patient Active Problem List    Diagnosis Date Noted     Recurrent malignant neoplasm of bladder (H) 02/22/2021     Priority: Medium     Added automatically from request for surgery [id number]       Bladder tumor 07/23/2020     Priority: Medium     Added automatically from request for surgery [id number]       Urothelial carcinoma of kidney, right (H) 06/03/2019     Priority: Medium     History of bladder cancer 06/03/2019     Priority: Medium     ACP (advance care planning) 12/13/2016     Priority: Medium     Advance Care Planning 12/13/2016: ACP Review of Chart / Resources Provided:  Reviewed chart for advance care plan.  Kait Augustin has no plan or code status on file. Discussed available resources and provided with information. Confirmed code status reflects current choices pending further ACP discussions.  Confirmed/documented legally designated decision makers.  Added by Radha Parekh             Malignant neoplasm of overlapping sites of bladder (H) 12/22/2015     Priority: Medium     H/O renal calculi 11/17/2014     Priority: Medium      Past Medical History:   Diagnosis Date     Cancer (H)     Bladder Cancer     H/O renal calculi 11/17/2014     MIGRAINE HEADACHES 1996     Nicotine dependence 11/17/2014     PONV (postoperative nausea and vomiting)      Tobacco abuse 11/17/2014      Past Surgical History:   Procedure Laterality Date     C LIGATE FALLOPIAN TUBE  1996     CYSTOSCOPY, RETROGRADES, INSERT STENT URETER(S), COMBINED Right 12/8/2015    Procedure: COMBINED CYSTOSCOPY, RETROGRADES, INSERT STENT URETER(S);  Surgeon: Efren Couch MD;  Location: [facility]     CYSTOSCOPY, RETROGRADES, INSERT STENT URETER(S), COMBINED Bilateral 8/4/2020    Procedure: bilateral retrograde pyelogram;  Surgeon: Delfino Walker MD;  Location: [facility] OR     CYSTOSCOPY, TRANSURETHRAL RESECTION (TUR) PROSTATE, COMBINED Bilateral 5/7/2019    Procedure: Transurethral Resection of Bladder Tumor, BILATERAL RETROGRADE PYELOGRAMS, RIGHT URETEROSCOPY WITH HOLMIUM LASER TUMOR ABLATION AND STENT PLACEMENT;  Surgeon: Delfino Walker MD;  Location: [facility] OR     CYSTOSCOPY, TRANSURETHRAL RESECTION (TUR) TUMOR BLADDER INSTILL CHEMOTHERAPY, COMBINED N/A 8/4/2020    Procedure: Transurethral resection of bladder tumor;  Surgeon: Delfino Walker MD;  Location: [facility] OR     CYSTOSCOPY, TRANSURETHRAL RESECTION (TUR) TUMOR BLADDER, COMBINED N/A 2/10/2015    Procedure: COMBINED CYSTOSCOPY, TRANSURETHRAL RESECTION (TUR) TUMOR BLADDER;  Surgeon: Efren Couch MD;  Location: [facility]     CYSTOSCOPY, TRANSURETHRAL RESECTION (TUR) TUMOR BLADDER, COMBINED N/A 12/8/2015    Procedure: COMBINED CYSTOSCOPY, TRANSURETHRAL RESECTION (TUR) TUMOR BLADDER;  Surgeon: Efren Couch MD;  Location: [facility] OR     CYSTOSCOPY, URETEROSCOPY, COMBINED Right 5/28/2019    Procedure: Right Ureteroscopy, Fulgaration if Right iUpper Tract Urothelial Carcinoma;  Surgeon: Delfino Walker MD;  Location: [facility] OR     CYSTOSCOPY, URETEROSCOPY, COMBINED Right 8/4/2020    Procedure: diagnostic ureteroscopy,ablation of urothelial tumor, possible fulguration through flexible ureteroscope;  Surgeon: Delfino Walker MD;  Location:  OR     Current Outpatient Medications   Medication Sig Dispense Refill     metroNIDAZOLE (METROGEL) 1 % external gel Apply  topically daily 60 g 1     triamcinolone (KENALOG) 0.1 % external ointment Apply topically 2 times daily 30 g 1       Allergies   Allergen Reactions     No Known Allergies         Social History     Tobacco Use     Smoking status: Current Every Day Smoker     Packs/day: 0.50     Years: 45.00     Pack years: 22.50     Types: Cigarettes     Smokeless tobacco: Never Used   Substance Use Topics     Alcohol use: Yes     Comment: rare     Family History   Problem Relation Age of Onset     C.A.D. Father         dx late 60's, CABG, angioplasty     Cancer Father         Lung, dx age 80 h/o tobacco use     Heart Disease Mother      Thyroid Disease Mother      Lipids Sister      History   Drug Use No         Objective     LMP 06/18/2005     Physical Exam    GENERAL APPEARANCE: healthy, alert and no distress     EYES: EOMI, PERRL     HENT: ear canals and TM's normal and nose and mouth without ulcers or lesions     NECK: no adenopathy, no asymmetry, masses, or scars and thyroid normal to palpation     RESP: lungs clear to auscultation - no rales, rhonchi or wheezes     CV: regular rates and rhythm, normal S1 S2, no S3 or S4 and no murmur, click or rub     ABDOMEN:  soft, nontender, no HSM or masses and bowel sounds normal     MS: extremities normal- no gross deformities noted, no evidence of inflammation in joints, FROM in all extremities.     SKIN: no suspicious lesions or rashes     NEURO: Normal strength and tone, sensory exam grossly normal, mentation intact and speech normal     PSYCH: mentation appears normal. and affect normal/bright     LYMPHATICS: No cervical adenopathy    Recent Labs   Lab Test 10/13/20  0908 08/07/20  1119 07/28/20  1044   HGB  --  13.3 14.4   PLT  --  195 227    139 141   POTASSIUM 3.9 3.8 4.0   CR 0.68 0.98 0.68        Diagnostics:  Component      Latest Ref Rng & Units 3/3/2021   WBC      4.0 - 11.0 10e9/L 9.0   RBC Count      3.8 - 5.2 10e12/L 4.18   Hemoglobin      11.7 - 15.7 g/dL 13.3    Hematocrit      35.0 - 47.0 % 39.2   MCV      78 - 100 fl 94   MCH      26.5 - 33.0 pg 31.8   MCHC      31.5 - 36.5 g/dL 33.9   RDW      10.0 - 15.0 % 13.2   Platelet Count      150 - 450 10e9/L 247   % Neutrophils      % 56.8   % Lymphocytes      % 31.4   % Monocytes      % 9.4   % Eosinophils      % 1.8   % Basophils      % 0.6   Absolute Neutrophil      1.6 - 8.3 10e9/L 5.1   Absolute Lymphocytes      0.8 - 5.3 10e9/L 2.8   Absolute Monocytes      0.0 - 1.3 10e9/L 0.9   Absolute Eosinophils      0.0 - 0.7 10e9/L 0.2   Absolute Basophils      0.0 - 0.2 10e9/L 0.1   Diff Method       Automated Method   Sodium      133 - 144 mmol/L 140   Potassium      3.4 - 5.3 mmol/L 3.7   Chloride      94 - 109 mmol/L 107   Carbon Dioxide      20 - 32 mmol/L 28   Anion Gap      3 - 14 mmol/L 5   Glucose      70 - 99 mg/dL 104 (H)   Urea Nitrogen      7 - 30 mg/dL 17   Creatinine      0.52 - 1.04 mg/dL 0.83   GFR Estimate      >60 mL/min/1.73:m2 77   GFR Estimate If Black      >60 mL/min/1.73:m2 89   Calcium      8.5 - 10.1 mg/dL 9.4   Bilirubin Total      0.2 - 1.3 mg/dL 0.6   Albumin      3.4 - 5.0 g/dL 4.3   Protein Total      6.8 - 8.8 g/dL 7.3   Alkaline Phosphatase      40 - 150 U/L 121   ALT      0 - 50 U/L 28   AST      0 - 45 U/L 20     Results for orders placed or performed in visit on 03/05/21   *UA reflex to Microscopic and Culture - [facility]     Status: None    Specimen: Midstream Urine   Result Value Ref Range    Color Urine Yellow     Appearance Urine Clear     Glucose Urine Negative NEG^Negative mg/dL    Bilirubin Urine Negative NEG^Negative    Ketones Urine Negative NEG^Negative mg/dL    Specific Gravity Urine >1.030 1.003 - 1.035    Blood Urine Negative NEG^Negative    pH Urine 5.5 5.0 - 7.0 pH    Protein Albumin Urine Negative NEG^Negative mg/dL    Urobilinogen Urine 0.2 0.2 - 1.0 EU/dL    Nitrite Urine Negative NEG^Negative    Leukocyte Esterase Urine Negative NEG^Negative    Source Midstream Urine       COVID PCR pending.     EKG: appears normal, NSR, normal axis, normal intervals, no acute ST/T changes c/w ischemia, no LVH by voltage criteria, unchanged from previous tracings    Revised Cardiac Risk Index (RCRI):  The patient has the following serious cardiovascular risks for perioperative complications:   - No serious cardiac risks = 0 points     RCRI Interpretation: 0 points: Class I (very low risk - 0.4% complication rate)         Signed Electronically by: AISSATOU Ritter CNP    Copy of this evaluation report is provided to requesting physician.    [facility] Preop Guidelines    Revised Cardiac Risk Index

## 2021-03-04 LAB
ALBUMIN SERPL-MCNC: 4.3 G/DL (ref 3.4–5)
ALP SERPL-CCNC: 121 U/L (ref 40–150)
ALT SERPL W P-5'-P-CCNC: 28 U/L (ref 0–50)
ANION GAP SERPL CALCULATED.3IONS-SCNC: 5 MMOL/L (ref 3–14)
AST SERPL W P-5'-P-CCNC: 20 U/L (ref 0–45)
BILIRUB SERPL-MCNC: 0.6 MG/DL (ref 0.2–1.3)
BUN SERPL-MCNC: 17 MG/DL (ref 7–30)
CALCIUM SERPL-MCNC: 9.4 MG/DL (ref 8.5–10.1)
CHLORIDE SERPL-SCNC: 107 MMOL/L (ref 94–109)
CO2 SERPL-SCNC: 28 MMOL/L (ref 20–32)
CREAT SERPL-MCNC: 0.83 MG/DL (ref 0.52–1.04)
GFR SERPL CREATININE-BSD FRML MDRD: 77 ML/MIN/{1.73_M2}
GLUCOSE SERPL-MCNC: 104 MG/DL (ref 70–99)
POTASSIUM SERPL-SCNC: 3.7 MMOL/L (ref 3.4–5.3)
PROT SERPL-MCNC: 7.3 G/DL (ref 6.8–8.8)
SODIUM SERPL-SCNC: 140 MMOL/L (ref 133–144)

## 2021-03-05 ENCOUNTER — OFFICE VISIT (OUTPATIENT)
Dept: FAMILY MEDICINE | Facility: OTHER | Age: 61
End: 2021-03-05
Attending: NURSE PRACTITIONER
Payer: COMMERCIAL

## 2021-03-05 VITALS
HEIGHT: 62 IN | SYSTOLIC BLOOD PRESSURE: 102 MMHG | HEART RATE: 78 BPM | TEMPERATURE: 97 F | WEIGHT: 130.9 LBS | DIASTOLIC BLOOD PRESSURE: 68 MMHG | BODY MASS INDEX: 24.09 KG/M2 | OXYGEN SATURATION: 97 %

## 2021-03-05 DIAGNOSIS — C67.9 RECURRENT MALIGNANT NEOPLASM OF BLADDER (H): ICD-10-CM

## 2021-03-05 DIAGNOSIS — Z01.818 PRE-OP EXAM: ICD-10-CM

## 2021-03-05 DIAGNOSIS — D49.4 BLADDER TUMOR: Primary | ICD-10-CM

## 2021-03-05 DIAGNOSIS — C64.1 UROTHELIAL CARCINOMA OF KIDNEY, RIGHT (H): ICD-10-CM

## 2021-03-05 DIAGNOSIS — C67.8 MALIGNANT NEOPLASM OF OVERLAPPING SITES OF BLADDER (H): ICD-10-CM

## 2021-03-05 LAB
ALBUMIN UR-MCNC: NEGATIVE MG/DL
APPEARANCE UR: CLEAR
BILIRUB UR QL STRIP: NEGATIVE
COLOR UR AUTO: YELLOW
GLUCOSE UR STRIP-MCNC: NEGATIVE MG/DL
HGB UR QL STRIP: NEGATIVE
KETONES UR STRIP-MCNC: NEGATIVE MG/DL
LEUKOCYTE ESTERASE UR QL STRIP: NEGATIVE
NITRATE UR QL: NEGATIVE
PH UR STRIP: 5.5 PH (ref 5–7)
SARS-COV-2 RNA RESP QL NAA+PROBE: NORMAL
SOURCE: NORMAL
SP GR UR STRIP: >1.03 (ref 1–1.03)
SPECIMEN SOURCE: NORMAL
UROBILINOGEN UR STRIP-ACNC: 0.2 EU/DL (ref 0.2–1)

## 2021-03-05 PROCEDURE — 99214 OFFICE O/P EST MOD 30 MIN: CPT | Performed by: NURSE PRACTITIONER

## 2021-03-05 PROCEDURE — U0005 INFEC AGEN DETEC AMPLI PROBE: HCPCS | Performed by: NURSE PRACTITIONER

## 2021-03-05 PROCEDURE — 81003 URINALYSIS AUTO W/O SCOPE: CPT | Performed by: NURSE PRACTITIONER

## 2021-03-05 PROCEDURE — U0003 INFECTIOUS AGENT DETECTION BY NUCLEIC ACID (DNA OR RNA); SEVERE ACUTE RESPIRATORY SYNDROME CORONAVIRUS 2 (SARS-COV-2) (CORONAVIRUS DISEASE [COVID-19]), AMPLIFIED PROBE TECHNIQUE, MAKING USE OF HIGH THROUGHPUT TECHNOLOGIES AS DESCRIBED BY CMS-2020-01-R: HCPCS | Performed by: NURSE PRACTITIONER

## 2021-03-05 ASSESSMENT — PAIN SCALES - GENERAL: PAINLEVEL: NO PAIN (0)

## 2021-03-05 ASSESSMENT — MIFFLIN-ST. JEOR: SCORE: 1117.01

## 2021-03-05 NOTE — PATIENT INSTRUCTIONS
Patient Education   Preparing for Your Surgery  Getting started  A nurse will call you to review your health history and instructions. They will give you an arrival time based on your scheduled surgery time.  Please be ready to share the following:    Your doctor's clinic name and phone number    Your medical, surgical and anesthesia history    A list of allergies and sensitivities    A list of medicines, including herbal treatments and over-the-counter drugs    Whether the patient has a legal guardian (ask how to send us the papers in advance)  If you have a child who's having surgery, please ask for a copy of Preparing for Your Child's Surgery.    Preparing for surgery    Within 30 days of surgery: Have a pre-op exam (sometimes called an H&P, or History and Physical). This can be done at a clinic or pre-operative center.  ? If you're having a , you may not need this exam. Talk to your care team    At your pre-op exam, talk to your care team about all medicines you take. If you need to stop any medicines before surgery, ask when to start taking them again.  ? We do this for your safety. Many medicines can make you bleed too much during surgery. Some change how well surgery (anesthesia) drugs work.    Call your insurance company to let them know you're having surgery. (If you don't have insurance, call [phone].)    Call your clinic if there's any change in your health. This includes signs of a cold or flu (sore throat, runny nose, cough, rash, fever). It also includes a scrape or scratch near the surgery site.    If you have questions on the day of surgery, call your hospital or surgery center.  Eating and drinking guidelines  For your safety: Unless your surgeon tells you otherwise, follow the guidelines below.    Eat and drink as usual until 8 hours before surgery. After that, no food or milk.    Drink clear liquids until 2 hours before surgery. These are liquids you can see through, like water,  Gatorade and Propel Water. You may also have black coffee and tea (no cream or milk).    Nothing by mouth within 2 hours of surgery. This includes gum, candy and breath mints.    If you drink, stop drinking alcohol the night before surgery.    If your care team tells you to take medicine on the morning of surgery, it's okay to take it with a sip of water.  Preventing infection    Shower or bathe the night before and morning of your surgery. Follow the instructions your clinic gave you. (If no instructions, use regular soap.)    Don't shave or clip hair near your surgery site. We'll remove the hair if needed.    Don't smoke or vape the morning of surgery. You may chew nicotine gum up to 2 hours before surgery. A nicotine patch is okay.  ? Note: Some surgeries require you to completely quit smoking and nicotine. Check with your surgeon.    Your care team will make every effort to keep you safe from infection. We will:  ? Clean our hands often with soap and water (or an alcohol-based hand rub).  ? Clean the skin at your surgery site with a special soap that kills germs.  ? Give you a special gown to keep you warm. (Cold raises the risk of infection.)  ? Wear special hair covers, masks, gowns and gloves during surgery.  ? Give antibiotic medicine, if prescribed. Not all surgeries need antibiotics.  What to bring on the day of surgery    Photo ID and insurance card    Copy of your health care directive, if you have one    Glasses and hearing aides (bring cases)  ? You can't wear contacts during surgery    Inhaler and eye drops, if you use them (tell us about these when you arrive)    CPAP machine or breathing device, if you use them    A few personal items, if spending the night    If you have . . .  ? A pacemaker or ICD (cardiac defibrillator): Bring the ID card.  ? An implanted stimulator: Bring the remote control.  ? A legal guardian: Bring a copy of the certified (court-stamped) guardianship papers.  Please remove  any jewelry, including body piercings. Leave jewelry and other valuables at home.  If you're going home the day of surgery  Important: If you don't follow the rules below, we must cancel your surgery.     Arrange for someone to drive you home after surgery. You may not drive, take a taxi or take public transportation by yourself (unless you'll have local anesthesia only).    Arrange for a responsible adult to stay with you overnight. If you don't, we may keep you in the hospital overnight, and you may need to pay the costs yourself.  Questions?   If you have any questions for your care team, list them here: _________________________________________________________________________________________________________________________________________________________________________________________________________________________________________________________________________________________________________________________  For informational purposes only. Not to replace the advice of your health care provider. Copyright   2003, 2019 [facility]. All rights reserved. Clinically reviewed by Gianna Colon MD. [employer] 869394 - REV 4/20.     MEDICATIONS:   Continue taking all prescribed medications as reported during your visit.   Stop all supplements (herbal, non-prescription vitamins and minerals) one week prior to surgery.   Stop all NSAIDS (naproxen, aspirin, ibuprofen) 1 week prior to surgery or as instructed by your surgical team.   Do not start any new medications prior to your surgery.

## 2021-03-05 NOTE — NURSING NOTE
"Chief Complaint   Patient presents with     Pre-Op Exam       Initial /68 (BP Location: Right arm, Patient Position: Chair, Cuff Size: Adult Regular)   Pulse 78   Temp 97  F (36.1  C) (Tympanic)   Ht 1.575 m (5' 2\")   Wt 59.4 kg (130 lb 14.4 oz)   LMP 06/18/2005   SpO2 97%   BMI 23.94 kg/m   Estimated body mass index is 23.94 kg/m  as calculated from the following:    Height as of this encounter: 1.575 m (5' 2\").    Weight as of this encounter: 59.4 kg (130 lb 14.4 oz).  Medication Reconciliation: complete  Nneka Rosales LPN  "

## 2021-03-06 LAB
LABORATORY COMMENT REPORT: NORMAL
SARS-COV-2 RNA RESP QL NAA+PROBE: NEGATIVE
SPECIMEN SOURCE: NORMAL

## 2021-03-08 ENCOUNTER — ANESTHESIA EVENT (OUTPATIENT)
Dept: SURGERY | Facility: OTHER | Age: 61
End: 2021-03-08
Payer: COMMERCIAL

## 2021-03-09 ENCOUNTER — ANESTHESIA (OUTPATIENT)
Dept: SURGERY | Facility: OTHER | Age: 61
End: 2021-03-09
Payer: COMMERCIAL

## 2021-03-09 ENCOUNTER — HOSPITAL ENCOUNTER (OUTPATIENT)
Facility: OTHER | Age: 61
Discharge: HOME | End: 2021-03-09
Attending: UROLOGY | Admitting: UROLOGY
Payer: COMMERCIAL

## 2021-03-09 ENCOUNTER — HOSPITAL ENCOUNTER (OUTPATIENT)
Dept: GENERAL RADIOLOGY | Facility: OTHER | Age: 61
End: 2021-03-09
Attending: UROLOGY | Admitting: UROLOGY
Payer: COMMERCIAL

## 2021-03-09 VITALS
HEART RATE: 62 BPM | DIASTOLIC BLOOD PRESSURE: 75 MMHG | BODY MASS INDEX: 23.94 KG/M2 | SYSTOLIC BLOOD PRESSURE: 119 MMHG | TEMPERATURE: 96.5 F | WEIGHT: 130.9 LBS | OXYGEN SATURATION: 97 % | RESPIRATION RATE: 16 BRPM

## 2021-03-09 DIAGNOSIS — Z85.51 HISTORY OF BLADDER CANCER: ICD-10-CM

## 2021-03-09 DIAGNOSIS — C67.9 RECURRENT MALIGNANT NEOPLASM OF BLADDER (H): ICD-10-CM

## 2021-03-09 DIAGNOSIS — D49.4 BLADDER TUMOR: ICD-10-CM

## 2021-03-09 PROCEDURE — 272N000001 HC OR GENERAL SUPPLY STERILE: Performed by: UROLOGY

## 2021-03-09 PROCEDURE — C1769 GUIDE WIRE: HCPCS | Performed by: UROLOGY

## 2021-03-09 PROCEDURE — 250N000009 HC RX 250: Performed by: UROLOGY

## 2021-03-09 PROCEDURE — 258N000003 HC RX IP 258 OP 636: Performed by: UROLOGY

## 2021-03-09 PROCEDURE — 710N000012 HC RECOVERY PHASE 2, PER MINUTE: Performed by: UROLOGY

## 2021-03-09 PROCEDURE — 52332 CYSTOSCOPY AND TREATMENT: CPT | Mod: XU | Performed by: UROLOGY

## 2021-03-09 PROCEDURE — 250N000009 HC RX 250: Performed by: NURSE ANESTHETIST, CERTIFIED REGISTERED

## 2021-03-09 PROCEDURE — 250N000011 HC RX IP 250 OP 636: Performed by: UROLOGY

## 2021-03-09 PROCEDURE — 250N000011 HC RX IP 250 OP 636: Performed by: NURSE ANESTHETIST, CERTIFIED REGISTERED

## 2021-03-09 PROCEDURE — 52354 CYSTOURETERO W/BIOPSY: CPT | Mod: 50 | Performed by: UROLOGY

## 2021-03-09 PROCEDURE — 999N000141 HC STATISTIC PRE-PROCEDURE NURSING ASSESSMENT: Performed by: UROLOGY

## 2021-03-09 PROCEDURE — 360N000076 HC SURGERY LEVEL 3, PER MIN: Performed by: UROLOGY

## 2021-03-09 PROCEDURE — 74420 UROGRAPHY RTRGR +-KUB: CPT | Mod: 26 | Performed by: UROLOGY

## 2021-03-09 PROCEDURE — 52235 CYSTOSCOPY AND TREATMENT: CPT | Mod: XU | Performed by: UROLOGY

## 2021-03-09 PROCEDURE — 51720 TREATMENT OF BLADDER LESION: CPT | Mod: XU | Performed by: UROLOGY

## 2021-03-09 PROCEDURE — 258N000001 HC RX 258: Performed by: UROLOGY

## 2021-03-09 PROCEDURE — C2617 STENT, NON-COR, TEM W/O DEL: HCPCS | Performed by: UROLOGY

## 2021-03-09 PROCEDURE — 999N000180 XR SURGERY CARM FLUORO LESS THAN 5 MIN: Mod: TC

## 2021-03-09 PROCEDURE — 710N000009 HC RECOVERY PHASE 1, LEVEL 1, PER MIN: Performed by: UROLOGY

## 2021-03-09 PROCEDURE — 258N000003 HC RX IP 258 OP 636: Performed by: NURSE ANESTHETIST, CERTIFIED REGISTERED

## 2021-03-09 PROCEDURE — 255N000002 HC RX 255 OP 636: Performed by: UROLOGY

## 2021-03-09 PROCEDURE — 52354 CYSTOURETERO W/BIOPSY: CPT | Performed by: NURSE ANESTHETIST, CERTIFIED REGISTERED

## 2021-03-09 PROCEDURE — C1894 INTRO/SHEATH, NON-LASER: HCPCS | Performed by: UROLOGY

## 2021-03-09 PROCEDURE — 370N000017 HC ANESTHESIA TECHNICAL FEE, PER MIN: Performed by: UROLOGY

## 2021-03-09 DEVICE — URETERAL STENT
Type: IMPLANTABLE DEVICE | Site: BLADDER | Status: FUNCTIONAL
Brand: CONTOUR™

## 2021-03-09 RX ORDER — SODIUM CHLORIDE, SODIUM LACTATE, POTASSIUM CHLORIDE, CALCIUM CHLORIDE 600; 310; 30; 20 MG/100ML; MG/100ML; MG/100ML; MG/100ML
INJECTION, SOLUTION INTRAVENOUS CONTINUOUS PRN
Status: DISCONTINUED | OUTPATIENT
Start: 2021-03-09 | End: 2021-03-09

## 2021-03-09 RX ORDER — KETOROLAC TROMETHAMINE 30 MG/ML
INJECTION, SOLUTION INTRAMUSCULAR; INTRAVENOUS PRN
Status: DISCONTINUED | OUTPATIENT
Start: 2021-03-09 | End: 2021-03-09

## 2021-03-09 RX ORDER — ONDANSETRON 2 MG/ML
INJECTION INTRAMUSCULAR; INTRAVENOUS PRN
Status: DISCONTINUED | OUTPATIENT
Start: 2021-03-09 | End: 2021-03-09

## 2021-03-09 RX ORDER — NALOXONE HYDROCHLORIDE 0.4 MG/ML
0.2 INJECTION, SOLUTION INTRAMUSCULAR; INTRAVENOUS; SUBCUTANEOUS
Status: DISCONTINUED | OUTPATIENT
Start: 2021-03-09 | End: 2021-03-09 | Stop reason: HOSPADM

## 2021-03-09 RX ORDER — SODIUM CHLORIDE 9 MG/ML
INJECTION, SOLUTION INTRAVENOUS CONTINUOUS
Status: DISCONTINUED | OUTPATIENT
Start: 2021-03-09 | End: 2021-03-09 | Stop reason: HOSPADM

## 2021-03-09 RX ORDER — LIDOCAINE 40 MG/G
CREAM TOPICAL
Status: DISCONTINUED | OUTPATIENT
Start: 2021-03-09 | End: 2021-03-09 | Stop reason: HOSPADM

## 2021-03-09 RX ORDER — ONDANSETRON 4 MG/1
4 TABLET, ORALLY DISINTEGRATING ORAL EVERY 30 MIN PRN
Status: DISCONTINUED | OUTPATIENT
Start: 2021-03-09 | End: 2021-03-09 | Stop reason: HOSPADM

## 2021-03-09 RX ORDER — ATROPA BELLADONNA AND OPIUM 16.2; 3 MG/1; MG/1
SUPPOSITORY RECTAL PRN
Status: DISCONTINUED | OUTPATIENT
Start: 2021-03-09 | End: 2021-03-09 | Stop reason: HOSPADM

## 2021-03-09 RX ORDER — SCOLOPAMINE TRANSDERMAL SYSTEM 1 MG/1
1 PATCH, EXTENDED RELEASE TRANSDERMAL
Status: DISCONTINUED | OUTPATIENT
Start: 2021-03-09 | End: 2021-03-09 | Stop reason: HOSPADM

## 2021-03-09 RX ORDER — HYDROCODONE BITARTRATE AND ACETAMINOPHEN 5; 325 MG/1; MG/1
1-2 TABLET ORAL EVERY 4 HOURS PRN
Status: DISCONTINUED | OUTPATIENT
Start: 2021-03-09 | End: 2021-03-09 | Stop reason: HOSPADM

## 2021-03-09 RX ORDER — CEFTRIAXONE SODIUM 1 G/50ML
1 INJECTION, SOLUTION INTRAVENOUS
Status: COMPLETED | OUTPATIENT
Start: 2021-03-09 | End: 2021-03-09

## 2021-03-09 RX ORDER — LIDOCAINE HYDROCHLORIDE 20 MG/ML
INJECTION, SOLUTION INFILTRATION; PERINEURAL PRN
Status: DISCONTINUED | OUTPATIENT
Start: 2021-03-09 | End: 2021-03-09

## 2021-03-09 RX ORDER — NALOXONE HYDROCHLORIDE 0.4 MG/ML
0.4 INJECTION, SOLUTION INTRAMUSCULAR; INTRAVENOUS; SUBCUTANEOUS
Status: DISCONTINUED | OUTPATIENT
Start: 2021-03-09 | End: 2021-03-09 | Stop reason: HOSPADM

## 2021-03-09 RX ORDER — FENTANYL CITRATE 50 UG/ML
25-50 INJECTION, SOLUTION INTRAMUSCULAR; INTRAVENOUS
Status: DISCONTINUED | OUTPATIENT
Start: 2021-03-09 | End: 2021-03-09 | Stop reason: HOSPADM

## 2021-03-09 RX ORDER — MEPERIDINE HYDROCHLORIDE 50 MG/ML
12.5 INJECTION INTRAMUSCULAR; INTRAVENOUS; SUBCUTANEOUS
Status: DISCONTINUED | OUTPATIENT
Start: 2021-03-09 | End: 2021-03-09 | Stop reason: HOSPADM

## 2021-03-09 RX ORDER — FENTANYL CITRATE 50 UG/ML
INJECTION, SOLUTION INTRAMUSCULAR; INTRAVENOUS PRN
Status: DISCONTINUED | OUTPATIENT
Start: 2021-03-09 | End: 2021-03-09

## 2021-03-09 RX ORDER — PROPOFOL 10 MG/ML
INJECTION, EMULSION INTRAVENOUS PRN
Status: DISCONTINUED | OUTPATIENT
Start: 2021-03-09 | End: 2021-03-09

## 2021-03-09 RX ORDER — HYDROMORPHONE HYDROCHLORIDE 1 MG/ML
.3-.5 INJECTION, SOLUTION INTRAMUSCULAR; INTRAVENOUS; SUBCUTANEOUS EVERY 10 MIN PRN
Status: DISCONTINUED | OUTPATIENT
Start: 2021-03-09 | End: 2021-03-09 | Stop reason: HOSPADM

## 2021-03-09 RX ORDER — HYDROCODONE BITARTRATE AND ACETAMINOPHEN 5; 325 MG/1; MG/1
1-2 TABLET ORAL EVERY 4 HOURS PRN
Qty: 15 TABLET | Refills: 0 | Status: SHIPPED | OUTPATIENT
Start: 2021-03-09 | End: 2021-10-26

## 2021-03-09 RX ORDER — DEXAMETHASONE SODIUM PHOSPHATE 4 MG/ML
INJECTION, SOLUTION INTRA-ARTICULAR; INTRALESIONAL; INTRAMUSCULAR; INTRAVENOUS; SOFT TISSUE PRN
Status: DISCONTINUED | OUTPATIENT
Start: 2021-03-09 | End: 2021-03-09

## 2021-03-09 RX ORDER — PROPOFOL 10 MG/ML
INJECTION, EMULSION INTRAVENOUS CONTINUOUS PRN
Status: DISCONTINUED | OUTPATIENT
Start: 2021-03-09 | End: 2021-03-09

## 2021-03-09 RX ORDER — ONDANSETRON 2 MG/ML
4 INJECTION INTRAMUSCULAR; INTRAVENOUS EVERY 30 MIN PRN
Status: DISCONTINUED | OUTPATIENT
Start: 2021-03-09 | End: 2021-03-09 | Stop reason: HOSPADM

## 2021-03-09 RX ADMIN — SODIUM CHLORIDE, POTASSIUM CHLORIDE, SODIUM LACTATE AND CALCIUM CHLORIDE: 600; 310; 30; 20 INJECTION, SOLUTION INTRAVENOUS at 08:50

## 2021-03-09 RX ADMIN — ONDANSETRON 4 MG: 2 INJECTION INTRAMUSCULAR; INTRAVENOUS at 07:58

## 2021-03-09 RX ADMIN — FENTANYL CITRATE 25 MCG: 50 INJECTION, SOLUTION INTRAMUSCULAR; INTRAVENOUS at 07:58

## 2021-03-09 RX ADMIN — PROPOFOL 140 MG: 10 INJECTION, EMULSION INTRAVENOUS at 08:02

## 2021-03-09 RX ADMIN — FENTANYL CITRATE 25 MCG: 50 INJECTION, SOLUTION INTRAMUSCULAR; INTRAVENOUS at 08:44

## 2021-03-09 RX ADMIN — SCOPALAMINE 1 PATCH: 1 PATCH, EXTENDED RELEASE TRANSDERMAL at 07:37

## 2021-03-09 RX ADMIN — CEFTRIAXONE SODIUM 1 G: 1 INJECTION, SOLUTION INTRAVENOUS at 07:59

## 2021-03-09 RX ADMIN — FENTANYL CITRATE 25 MCG: 50 INJECTION, SOLUTION INTRAMUSCULAR; INTRAVENOUS at 08:28

## 2021-03-09 RX ADMIN — PROPOFOL 180 MCG/KG/MIN: 10 INJECTION, EMULSION INTRAVENOUS at 08:02

## 2021-03-09 RX ADMIN — KETOROLAC TROMETHAMINE 15 MG: 30 INJECTION, SOLUTION INTRAMUSCULAR at 08:08

## 2021-03-09 RX ADMIN — SODIUM CHLORIDE: 0.9 INJECTION, SOLUTION INTRAVENOUS at 07:22

## 2021-03-09 RX ADMIN — DEXAMETHASONE SODIUM PHOSPHATE 4 MG: 4 INJECTION, SOLUTION INTRA-ARTICULAR; INTRALESIONAL; INTRAMUSCULAR; INTRAVENOUS; SOFT TISSUE at 08:02

## 2021-03-09 RX ADMIN — FENTANYL CITRATE 25 MCG: 50 INJECTION, SOLUTION INTRAMUSCULAR; INTRAVENOUS at 08:57

## 2021-03-09 RX ADMIN — LIDOCAINE HYDROCHLORIDE 60 MG: 20 INJECTION, SOLUTION INFILTRATION; PERINEURAL at 08:02

## 2021-03-09 RX ADMIN — GEMCITABINE HYDROCHLORIDE 2000 MG: 2 INJECTION INTRAVENOUS at 10:13

## 2021-03-09 RX ADMIN — MIDAZOLAM 2 MG: 1 INJECTION INTRAMUSCULAR; INTRAVENOUS at 07:58

## 2021-03-09 ASSESSMENT — LIFESTYLE VARIABLES: TOBACCO_USE: 1

## 2021-03-09 NOTE — PROGRESS NOTES
Preprocedure diagnosis  Bladder tumor  History of bladder cancer    Postprocedure diagnosis  Bladder tumor  History of bladder cancer    Procedure  Instillation of intravesical chemotherapy    Surgeon  Delfino Walker MD    Anesthesia  None    Complications  None    Indication  Kait Augustin is a 60 year old female who underwent TURBT in the OR today.  I recommended post-op intravesical chemo instillation.  Tumor appeared low grade and likely will not proceed to induction intravesical therapy.  TURBT went well and there was no obvious bladder perforation identified during the case.    Procedure  The patient was in the PACU with indwelling catheter in place.  I instilled gemcitabine 2000 mg in 100ml of normal saline via catheter and clamped the catheter.    Plan  Drain after 1 hour dwell time (11:15am)  Patient will be discharged with catheter in place.

## 2021-03-09 NOTE — DISCHARGE INSTRUCTIONS
[facility]  Adult Discharge Orders & Instructions      For 24 hours after surgery:  1. Get plenty of rest.  A responsible adult must stay with you for at least 24 hours after you leave the hospital.   2. You may feel lightheaded.  IF so, sit for a few minutes before standing.  Have someone help you get up.   3. You may have a slight fever. Call the doctor if your fever is over 101 F (38.3 C) (taken under the tongue) or lasts longer than 24 hours.  4. You may have a dry mouth, a sore throat, muscle aches or trouble sleeping.  These should go away after 24 hours.  5. Do not make important or legal decisions.  6.   Do not drive or use heavy equipment.  If you have weakness or tingling, don't drive or use heavy equipment until this feeling goes away.                                                                                                                                                                         To contact a doctor, call    [phone]______________

## 2021-03-09 NOTE — OR NURSING
PATIENT has been discharged to HOME at 1200 via AMBULATORY accompanied by DAUGHTER    Written discharge instructions were provided to patient.  Prescriptions were picked up at [facility] pharmacy.      Patient and adult caring for them verbalize understanding of discharge instructions including no driving until tomorrow and no longer taking narcotic pain medications - no operating mechanical equipment and no making any important decisions.They understand reason for discharge, and necessary follow-up appointments.

## 2021-03-09 NOTE — ANESTHESIA CARE TRANSFER NOTE
Patient: Kait Augustin    Procedure(s):  Transurethral resection of bladder tumor, bilateral retrograde pyelograms and gemcitabine instillation    Diagnosis: History of bladder cancer [Z85.51]  Recurrent malignant neoplasm of bladder (H) [C67.9]  Diagnosis Additional Information: No value filed.    Anesthesia Type:   General     Note:    Oropharynx: oropharynx clear of all foreign objects  Level of Consciousness: awake and drowsy  Oxygen Supplementation: face mask    Independent Airway: airway patency satisfactory and stable  Dentition: dentition unchanged  Vital Signs Stable: post-procedure vital signs reviewed and stable  Report to RN Given: handoff report given  Patient transferred to: PACU    Handoff Report: Identifed the Patient, Identified the Reponsible Provider, Reviewed the pertinent medical history, Discussed the surgical course, Reviewed Intra-OP anesthesia mangement and issues during anesthesia, Set expectations for post-procedure period and Allowed opportunity for questions and acknowledgement of understanding      Vitals: (Last set prior to Anesthesia Care Transfer)  CRNA VITALS  3/9/2021 0855 - 3/9/2021 0926      3/9/2021             Resp Rate (set):  10        Electronically Signed By: AISSATOU Andrews CRNA  March 9, 2021  9:26 AM

## 2021-03-09 NOTE — ANESTHESIA PREPROCEDURE EVALUATION
Anesthesia Pre-Procedure Evaluation    Patient: Kait Augustin   MRN: [id number] : 1960        Preoperative Diagnosis: History of bladder cancer [Z85.51]  Recurrent malignant neoplasm of bladder (H) [C67.9]   Procedure : Procedure(s):  Transurethral resection of bladder tumor, bilateral retrograde pyelograms and possible gemcitabine instillation     Past Medical History:   Diagnosis Date     Cancer (H)     Bladder Cancer     H/O renal calculi 2014     MIGRAINE HEADACHES      Nicotine dependence 2014     PONV (postoperative nausea and vomiting)      Tobacco abuse 2014      Past Surgical History:   Procedure Laterality Date     C LIGATE FALLOPIAN TUBE       CYSTOSCOPY, RETROGRADES, INSERT STENT URETER(S), COMBINED Right 2015    Procedure: COMBINED CYSTOSCOPY, RETROGRADES, INSERT STENT URETER(S);  Surgeon: Efren Couch MD;  Location: [facility]     CYSTOSCOPY, RETROGRADES, INSERT STENT URETER(S), COMBINED Bilateral 2020    Procedure: bilateral retrograde pyelogram;  Surgeon: Delfino Walker MD;  Location: [facility]     CYSTOSCOPY, TRANSURETHRAL RESECTION (TUR) PROSTATE, COMBINED Bilateral 2019    Procedure: Transurethral Resection of Bladder Tumor, BILATERAL RETROGRADE PYELOGRAMS, RIGHT URETEROSCOPY WITH HOLMIUM LASER TUMOR ABLATION AND STENT PLACEMENT;  Surgeon: Delfino Walker MD;  Location: [facility]     CYSTOSCOPY, TRANSURETHRAL RESECTION (TUR) TUMOR BLADDER INSTILL CHEMOTHERAPY, COMBINED N/A 2020    Procedure: Transurethral resection of bladder tumor;  Surgeon: Delfino Walker MD;  Location: [facility]     CYSTOSCOPY, TRANSURETHRAL RESECTION (TUR) TUMOR BLADDER, COMBINED N/A 2/10/2015    Procedure: COMBINED CYSTOSCOPY, TRANSURETHRAL RESECTION (TUR) TUMOR BLADDER;  Surgeon: Efren Couch MD;  Location: [facility]     CYSTOSCOPY, TRANSURETHRAL RESECTION (TUR) TUMOR BLADDER, COMBINED N/A 2015    Procedure: COMBINED CYSTOSCOPY, TRANSURETHRAL RESECTION (TUR)  TUMOR BLADDER;  Surgeon: Efren Couch MD;  Location: [facility]     CYSTOSCOPY, URETEROSCOPY, COMBINED Right 5/28/2019    Procedure: Right Ureteroscopy, Fulgaration if Right iUpper Tract Urothelial Carcinoma;  Surgeon: Delfino Walker MD;  Location: [facility]     CYSTOSCOPY, URETEROSCOPY, COMBINED Right 8/4/2020    Procedure: diagnostic ureteroscopy,ablation of urothelial tumor, possible fulguration through flexible ureteroscope;  Surgeon: Delfino Walker MD;  Location: [facility]      Allergies   Allergen Reactions     No Known Allergies       Social History     Tobacco Use     Smoking status: Current Every Day Smoker     Packs/day: 0.50     Years: 45.00     Pack years: 22.50     Types: Cigarettes     Smokeless tobacco: Never Used   Substance Use Topics     Alcohol use: Yes     Comment: rare      Wt Readings from Last 1 Encounters:   03/05/21 59.4 kg (130 lb 14.4 oz)        Anesthesia Evaluation   Pt has had prior anesthetic.     History of anesthetic complications  - PONV.      ROS/MED HX  ENT/Pulmonary:     (+) tobacco use, Current use, 1 packs/day,     Neurologic:  - neg neurologic ROS     Cardiovascular:  - neg cardiovascular ROS     METS/Exercise Tolerance: >4 METS    Hematologic:  - neg hematologic  ROS     Musculoskeletal:  - neg musculoskeletal ROS     GI/Hepatic:  - neg GI/hepatic ROS     Renal/Genitourinary:     (+) renal disease,     Endo:  - neg endo ROS     Psychiatric/Substance Use:  - neg psychiatric ROS     Infectious Disease:  - neg infectious disease ROS     Malignancy:  - neg malignancy ROS     Other:  - neg other ROS          Physical Exam    Airway        Mallampati: II   TM distance: > 3 FB   Neck ROM: full   Mouth opening: > 3 cm    Respiratory Devices and Support         Dental  no notable dental history         Cardiovascular   cardiovascular exam normal       Rhythm and rate: regular and normal     Pulmonary   pulmonary exam normal        breath sounds clear to auscultation           OUTSIDE  LABS:  CBC:   Lab Results   Component Value Date    WBC 9.0 03/03/2021    WBC 9.9 08/07/2020    HGB 13.3 03/03/2021    HGB 13.3 08/07/2020    HCT 39.2 03/03/2021    HCT 38.2 08/07/2020     03/03/2021     08/07/2020     BMP:   Lab Results   Component Value Date     03/03/2021     10/13/2020    POTASSIUM 3.7 03/03/2021    POTASSIUM 3.9 10/13/2020    CHLORIDE 107 03/03/2021    CHLORIDE 113 (H) 10/13/2020    CO2 28 03/03/2021    CO2 24 10/13/2020    BUN 17 03/03/2021    BUN 14 10/13/2020    CR 0.83 03/03/2021    CR 0.68 10/13/2020     (H) 03/03/2021     (H) 10/13/2020     COAGS:   Lab Results   Component Value Date    INR 1.0 01/23/2015     POC: No results found for: BGM, HCG, HCGS  HEPATIC:   Lab Results   Component Value Date    ALBUMIN 4.3 03/03/2021    PROTTOTAL 7.3 03/03/2021    ALT 28 03/03/2021    AST 20 03/03/2021    ALKPHOS 121 03/03/2021    BILITOTAL 0.6 03/03/2021     OTHER:   Lab Results   Component Value Date    LONG 9.4 03/03/2021    PHOS 3.7 05/22/2019    TSH 0.47 08/24/2020       Anesthesia Plan    ASA Status:  2   NPO Status:  NPO Appropriate    Anesthesia Type: General.     - Airway: LMA   Induction: Propofol, Intravenous.   Maintenance: TIVA.        Consents    Anesthesia Plan(s) and associated risks, benefits, and realistic alternatives discussed. Questions answered and patient/representative(s) expressed understanding.     - Discussed with:  Patient      - Extended Intubation/Ventilatory Support Discussed: No.      - Patient is DNR/DNI Status: No    Use of blood products discussed: Yes.     - Discussed with: Patient.     - Consented: consented to blood products     Postoperative Care    Pain management: IV analgesics, Multi-modal analgesia.   PONV prophylaxis: Ondansetron (or other 5HT-3), Dexamethasone or Solumedrol, Scopolamine patch, Background Propofol Infusion     Comments:                AISSATOU LEOS CRNA

## 2021-03-09 NOTE — OR NURSING
PACU Transfer Note    Kait Augustin was transferred to dsu via cart.  Equipment used for transport:  none.  Accompanied by:  RN  Prescriptions were: with patient    PACU Respiratory Event Documentation     1) Episodes of Apnea greater than or equal to 10 seconds: 0    2) Bradypnea - less than 8 breaths per minute: 0    3) Pain score on 0 to 10 scale: 2    4) Pain-sedation mismatch (yes or no): no    5) Repeated 02 desaturation less than 90% (yes or no): no    Anesthesia notified? (yes or no): yes updated    Any of the above events occuring repeatedly in separate 30 minute intervals may be considered recurrent PACU respiratory events.    Patient stable and meets phase 1 discharge criteria for transport from PACU.    Report to XAVI gonzalez

## 2021-03-09 NOTE — OP NOTE
Preoperative diagnosis  Bladder tumor  History of bladder cancer  History of right upper tract urothelial carcinoma    Postoperative diagnosis  Bladder tumor  History of bladder cancer  Right upper tract urothelial carcinoma, recurrent    Procedure  Transurethral resection of bladder tumor, 2-5cm  Cystoscopy with bilateral retrograde pyelogram  Interpretation of retrograde, bilateral  Bilateral ureteroscopy with fulguration of right upper tract tumor  Right ureteral stent placement    Surgeon  Delfino Walker MD    Surgeon(s)/Proceduralist(s) and Assistants (if any)  Surgeon(s):  Delfino Walker MD  Circulator: Izabel Melara RN  Relief Circulator: Molly No RN  Relief Scrub: Audra Meredith  Scrub Person: Samantha Worley  X-Ray Technologist: Elisa Marvin  First Assistant: Tatyana Worley RN    Specimen(s)  No  Bladder tumors were flat and prominent tumor was at bladder dome.    (EBL) Estimated blood loss (ml)  10    Anesthesia  General    Complications  None    Findings  There were multiple intertrigonal flat papillary tumors.  These were treated en bloc for a total of 2.5cm diameter.    Left retrograde pyelogram revealed a delicate system with normal caliber ureter with no filling defects.      No filling defects with the renal pelvis or calyces.  Left ureteroscopy revealed normal collecting system without papillary tumors.    Right retrograde pyelogram revealed a delicate system with normal caliber ureter with no filling defects.      No filling defects with the renal pelvis or calyces.  Right diagnostic ureteroscopy revealed multifocal papillary low grade appearing tumors in the lower pole, 5mm in size.    Indications  60 year old female agreed to undergo the above named procedure after discussion of the alternatives, risks and benefits.    The patient was found to have a bladder tumor on surveillance cystoscopy.  She has a remote history of bladder cancer s/p TURBT x 3 involving the right ureteral  orifice as well as history of  right upper tract urothelial carcinoma.  Informed consent was obtained.      Procedure  The patient was taken to the operating room and placed supine on the operating table.    Pre-operative antibiotics were administered and bilateral lower extremity SCDs were placed.    After induction of general anesthesia the patient was positioned in dorsal lithotomy.    A time-out was performed and the patient was prepped and draped in a sterile fashion.      A 22 Ecuadorean rigid cystoscope was introduced into the bladder under direct vision and cystoscopy was performed.    A Sensor wire was passed through the left ureteral orifice and a 5 Ecuadorean catheter was passed over the wire and the wire was removed.    A retrograde pyelogram was performed by slowly injecting Omnipaque contrast, 5 mL, through the 5 Ecuadorean catheter.    The entire collecting system was fluoroscopically visualized with findings described above.    A flexible ureteroscope was passed to the level of the left kidney and no tumors were identified.  The scope was removed.  Scope was removed.    The 5 Ecuadorean catheter was then passed through the right ureteral orifice and the same procedure was performed as described above.    The entire collecting system was fluoroscopically visualized with findings described above.    A flexible ureteroscope was passed to the level of the right kidney and the tumor was identified as described.  The scope was removed.  An 8-10 was used to place a Sensor wire and Superstiff wire to the level of the kidney confirmed by fluoroscopy.  An access sheath, 11-13 x 36 was passed to the level of the kidney under fluoroscopic guidance.  The multifocal lower pole renal pelvis tumors were treated with Holmium laser at 0.2 and 50 settings.  Tumor was completely treated.  The access sheath was removed.    A 5-Ecuadorean open-ended catheter was passed over the wire.    A retrograde pyelogram was performed by slowly  injecting 4mL of Omnipaque contrast via the 5 Bermudian catheter with findings described above.   A superstiff wire was then placed into the upper pole and a 6 x 22 Bermudian ureteral stent was placed in retrograde fashion under fluoroscopic guidance with good coil confirmed to be in the upper pole of the kidney and in the bladder.    A 26 Bermudian continuous flow resectoscope was introduced into the bladder.   Using a working element, the described mass was fulgurated and resected.  Given the flat tumors resection remained more superficial as these tumors were clearly noninvasive.    This was felt to be a complete resection of visible tumor.    Hemostasis was confirmed and an 16F catheter was placed in a sterile fashion.      The patient tolerated the procedure well, was awakened and transferred to the recovery room in stable condition.    Plan  Follow up in clinic for stent pull in 1 week or so.

## 2021-03-09 NOTE — OR NURSING
Dr. Walker instilled Gemcitabine into bladder catheter.   Catheter clamped. He instructed to unclamp catheter at 11:15 and remove bateman.

## 2021-03-09 NOTE — OR NURSING
Catheter unclamped at 1115. Drained chemo medication. Had patient change position and stand to empty bladder. Villaseñor pulled. Patient was able to void in bathroom.

## 2021-03-09 NOTE — ANESTHESIA POSTPROCEDURE EVALUATION
Patient: Kait Augustin    Procedure(s):  Transurethral resection of bladder tumor, bilateral retrograde pyelograms and gemcitabine instillation    Diagnosis:History of bladder cancer [Z85.51]  Recurrent malignant neoplasm of bladder (H) [C67.9]  Diagnosis Additional Information: No value filed.    Anesthesia Type:  General    Note:  Disposition: Outpatient   Postop Pain Control: Uneventful            Sign Out: Well controlled pain   PONV: No   Neuro/Psych: Uneventful            Sign Out: Acceptable/Baseline neuro status   Airway/Respiratory: Uneventful            Sign Out: Acceptable/Baseline resp. status   CV/Hemodynamics: Uneventful            Sign Out: Acceptable CV status   Other NRE: NONE   DID A NON-ROUTINE EVENT OCCUR? No         Last vitals:  Vitals:    03/09/21 1030 03/09/21 1045 03/09/21 1100   BP: 113/74 112/75 119/75   Pulse: 64 64 62   Resp:      Temp:      SpO2: 97% 97% 97%       Last vitals prior to Anesthesia Care Transfer:  CRNA VITALS  3/9/2021 0855 - 3/9/2021 0955      3/9/2021             Resp Rate (set):  10          Electronically Signed By: AISSATOU LEOS CRNA  March 9, 2021  12:38 PM

## 2021-03-15 ENCOUNTER — OFFICE VISIT (OUTPATIENT)
Dept: UROLOGY | Facility: OTHER | Age: 61
End: 2021-03-15
Attending: UROLOGY
Payer: COMMERCIAL

## 2021-03-15 VITALS — RESPIRATION RATE: 16 BRPM | HEART RATE: 84 BPM | BODY MASS INDEX: 23.19 KG/M2 | WEIGHT: 126.8 LBS

## 2021-03-15 DIAGNOSIS — C64.1 UROTHELIAL CARCINOMA OF KIDNEY, RIGHT (H): Primary | ICD-10-CM

## 2021-03-15 PROCEDURE — 99213 OFFICE O/P EST LOW 20 MIN: CPT | Mod: 25 | Performed by: UROLOGY

## 2021-03-15 PROCEDURE — 52310 CYSTOSCOPY AND TREATMENT: CPT | Performed by: UROLOGY

## 2021-03-15 ASSESSMENT — PAIN SCALES - GENERAL: PAINLEVEL: MILD PAIN (3)

## 2021-03-15 NOTE — NURSING NOTE
Cefuroxime 500mg tablet (2-250mg tablets with same lot # and expiration date) by mouth one time now ordered by Delfino Walker MD.  Medication administered per verbal order   Lot # [id number]  Exp. 12/31/2021  Patient tolerated well.  Mariza Rubin RN..................3/15/2021  9:05 AM

## 2021-03-15 NOTE — PROGRESS NOTES
Preoperative diagnosis  Right upper tract urothelial carcinoma    Postoperative diagnosis  Right upper tract urothelial carcinoma    Procedure  Flexible cystourethroscopy with stent removal    Surgeon  Delfino Walker MD    Anesthesia  2% lidocaine jelly intraurethrally    Complications  None    Indications  60 year old female who is status post holmium laser lithotripsy of an upper tract tumor who presents for stent removal.    Procedure  The patient was given one dose of antibiotics. The patient was placed in supine position and was prepped and draped in sterile fashion.  2% lidocaine jelly was bluntly injected per urethra without difficulty. I passed the 14 Greek flexible cystoscope through the urethra and into the bladder.  With the aid of a stent grasper I grasped and removed the stent in its entirety.  The patient tolerated the procedure well.    Assessment  Ms. Augustin is a 60-year-old female with a history of recurrent right upper tract urothelial carcinoma.  She follows up for stent pull today.  The tumors involving the right upper tract were found at the time of a bladder tumor resection.  I treated all visible tumor however the patient has a significant risk for recurrence and there were areas of the collecting system I could not visualize.    Because of this I have recommended right upper tract mitomycin gel instillation  We discussed this new medication, benefits and risks.  Also discussed and observation alternative without treatment but given her age and recurrence rate she is highly motivated for treatment that could decrease risks of recurrent surgery/ureteroscopy.    Plan  Follow-up for surveillance cystoscopy in 3 months  We will also pursue plans for Jelmyto instillation of the right upper tract.        I spent 24 minutes on this patient's visit (exclusive of separately billed services/procedures) and over half of this time was spent in face-to-face counseling regarding stone prevention:   Prevention strategies with fluids and diet, rationale for a metabolic work up, prognosis and importance of compliance.

## 2021-03-15 NOTE — NURSING NOTE
Patient positioned in frog leg position prior to Delfino Walker MD prepping patient with chlorhexidine gluconate cleanser.    [place] Protocol    A. Pre-procedure verification complete Yes   1-relevant information / documentation available, reviewed and properly matched to the patient; 2-consent accurate and complete, 3-equipment and supplies available    B. Site marking complete N/A  Site marked if not in continuous attendance with patient    C. TIME OUT completed Yes  Time Out was conducted just prior to starting procedure to verify the eight required elements: 1-patient identity, 2-consent accurate and complete, 3-position, 4-correct side/site marked (if applicable), 5-procedure, 6-relevant images / results properly labeled and displayed (if applicable), 7-antibiotics / irrigation fluids (if applicable), 8-safety precautions.    After procedure perineum area rinsed. Discharge instructions reviewed with patient. Patient verbalized understanding of discharge instructions and discharged ambulatory.  Mariza Rubin RN..................3/15/2021  9:20 AM

## 2021-03-15 NOTE — PATIENT INSTRUCTIONS

## 2021-03-22 ENCOUNTER — TELEPHONE (OUTPATIENT)
Dept: UROLOGY | Facility: OTHER | Age: 61
End: 2021-03-22

## 2021-03-22 NOTE — TELEPHONE ENCOUNTER
Patient was notified the the Jel myto is done at [facility]. She can either get a referral to [facility], schedule a phone visit with Dr. Walker, or wait until her appointment with Dr. Walker on 06/23/2021 if she has any questions. She will contact her insurance to see if this would be covered and wait until her appointment in June with Dr. Walker to discuss it.    Vee Mejía on 3/22/2021 at 10:31 AM

## 2021-03-31 NOTE — PROGRESS NOTES
Assessment & Plan       Bladder tumor  - CBC with platelets and differential  Recurrent malignant neoplasm of bladder (H)  Urothelial carcinoma of kidney, right (H)  - Follow-up with Dr. Walker as scheduled    Hyperlipidemia LDL goal <100  - Lipid Profile (Chol, Trig, HDL, LDL calc)  - Low fat diet      Follow-up 6 months    Itzel Phillip CNP  [facility] Menchaca is a 60 year old who presents for the following health issues       History of Bladder Cancer  Has been following with Dr. Walker  Upcoming appointment 6/23/21  His most recent note is attached  Below    She denies current hematuria  No flank pain, fever or other concerns      Assessment  Ms. Augustin is a 60-year-old female with a history of recurrent right upper tract urothelial carcinoma.  She follows up for stent pull today.  The tumors involving the right upper tract were found at the time of a bladder tumor resection.  I treated all visible tumor however the patient has a significant risk for recurrence and there were areas of the collecting system I could not visualize.     Because of this I have recommended right upper tract mitomycin gel instillation  We discussed this new medication, benefits and risks.  Also discussed and observation alternative without treatment but given her age and recurrence rate she is highly motivated for treatment that could decrease risks of recurrent surgery/ureteroscopy.     Plan  Follow-up for surveillance cystoscopy in 3 months  We will also pursue plans for Jelmyto instillation of the right upper tract.         Review of Systems   Constitutional, HEENT, cardiovascular, pulmonary, gi and gu systems are negative, except as otherwise noted.        Objective    /62 (BP Location: Right arm, Patient Position: Sitting, Cuff Size: Adult Regular)   Pulse 58   Temp 97.2  F (36.2  C) (Tympanic)   Resp 18   Wt 58.1 kg (128 lb)   LMP 06/18/2005   SpO2 97%   BMI 23.41 kg/m    Body mass  index is 23.41 kg/m .       Physical Exam   GENERAL: healthy, alert and no distress  EYES: Eyes grossly normal to inspection, PERRL and conjunctivae and sclerae normal  NECK: no adenopathy, no asymmetry, masses, or scars and thyroid normal to palpation  RESP: lungs clear to auscultation - no rales, rhonchi or wheezes  CV: regular rate and rhythm, normal S1 S2, no S3 or S4, no murmur, click or rub, no peripheral edema and peripheral pulses strong  ABDOMEN: soft, nontender, no hepatosplenomegaly, no masses and bowel sounds normal  MS: no gross musculoskeletal defects noted, no edema  SKIN: no suspicious lesions or rashes  PSYCH: mentation appears normal, affect normal/bright        Declines Hubbell guard, as insurance wont cover it  Declines upcoming Mammogram

## 2021-04-12 ENCOUNTER — OFFICE VISIT (OUTPATIENT)
Dept: FAMILY MEDICINE | Facility: OTHER | Age: 61
End: 2021-04-12
Attending: NURSE PRACTITIONER
Payer: COMMERCIAL

## 2021-04-12 VITALS
DIASTOLIC BLOOD PRESSURE: 62 MMHG | TEMPERATURE: 97.2 F | BODY MASS INDEX: 23.41 KG/M2 | HEART RATE: 58 BPM | OXYGEN SATURATION: 97 % | SYSTOLIC BLOOD PRESSURE: 110 MMHG | WEIGHT: 128 LBS | RESPIRATION RATE: 18 BRPM

## 2021-04-12 DIAGNOSIS — D49.4 BLADDER TUMOR: Primary | ICD-10-CM

## 2021-04-12 DIAGNOSIS — E78.5 HYPERLIPIDEMIA LDL GOAL <100: ICD-10-CM

## 2021-04-12 DIAGNOSIS — C67.9 RECURRENT MALIGNANT NEOPLASM OF BLADDER (H): ICD-10-CM

## 2021-04-12 DIAGNOSIS — C64.1 UROTHELIAL CARCINOMA OF KIDNEY, RIGHT (H): ICD-10-CM

## 2021-04-12 LAB
BASOPHILS # BLD AUTO: 0.1 10E9/L (ref 0–0.2)
BASOPHILS NFR BLD AUTO: 1.1 %
CHOLEST SERPL-MCNC: 273 MG/DL
DIFFERENTIAL METHOD BLD: NORMAL
EOSINOPHIL # BLD AUTO: 0.3 10E9/L (ref 0–0.7)
EOSINOPHIL NFR BLD AUTO: 3.5 %
ERYTHROCYTE [DISTWIDTH] IN BLOOD BY AUTOMATED COUNT: 13.1 % (ref 10–15)
HCT VFR BLD AUTO: 40.7 % (ref 35–47)
HDLC SERPL-MCNC: 62 MG/DL
HGB BLD-MCNC: 13.6 G/DL (ref 11.7–15.7)
LDLC SERPL CALC-MCNC: 188 MG/DL
LYMPHOCYTES # BLD AUTO: 2.1 10E9/L (ref 0.8–5.3)
LYMPHOCYTES NFR BLD AUTO: 28.8 %
MCH RBC QN AUTO: 31.7 PG (ref 26.5–33)
MCHC RBC AUTO-ENTMCNC: 33.4 G/DL (ref 31.5–36.5)
MCV RBC AUTO: 95 FL (ref 78–100)
MONOCYTES # BLD AUTO: 0.8 10E9/L (ref 0–1.3)
MONOCYTES NFR BLD AUTO: 10.2 %
NEUTROPHILS # BLD AUTO: 4.1 10E9/L (ref 1.6–8.3)
NEUTROPHILS NFR BLD AUTO: 56.4 %
NONHDLC SERPL-MCNC: 211 MG/DL
PLATELET # BLD AUTO: 203 10E9/L (ref 150–450)
RBC # BLD AUTO: 4.29 10E12/L (ref 3.8–5.2)
TRIGL SERPL-MCNC: 116 MG/DL
WBC # BLD AUTO: 7.4 10E9/L (ref 4–11)

## 2021-04-12 PROCEDURE — 80061 LIPID PANEL: CPT | Performed by: NURSE PRACTITIONER

## 2021-04-12 PROCEDURE — 36415 COLL VENOUS BLD VENIPUNCTURE: CPT | Performed by: NURSE PRACTITIONER

## 2021-04-12 PROCEDURE — 99214 OFFICE O/P EST MOD 30 MIN: CPT | Performed by: NURSE PRACTITIONER

## 2021-04-12 PROCEDURE — 85025 COMPLETE CBC W/AUTO DIFF WBC: CPT | Performed by: NURSE PRACTITIONER

## 2021-04-12 NOTE — PATIENT INSTRUCTIONS
Assessment & Plan       Bladder tumor  - CBC with platelets and differential  Recurrent malignant neoplasm of bladder (H)  Urothelial carcinoma of kidney, right (H)  - Follow-up with Dr. Walker as scheduled      Hyperlipidemia LDL goal <100  - Lipid Profile (Chol, Trig, HDL, LDL calc)  - Low fat diet      Follow-up 6 months    Itzel Phillip CNP  [facility]

## 2021-04-12 NOTE — NURSING NOTE
"Chief Complaint   Patient presents with     RECHECK       Initial /62 (BP Location: Right arm, Patient Position: Sitting, Cuff Size: Adult Regular)   Pulse 58   Temp 97.2  F (36.2  C) (Tympanic)   Resp 18   Wt 58.1 kg (128 lb)   LMP 06/18/2005   SpO2 97%   BMI 23.41 kg/m   Estimated body mass index is 23.41 kg/m  as calculated from the following:    Height as of 3/5/21: 1.575 m (5' 2\").    Weight as of this encounter: 58.1 kg (128 lb).  Medication Reconciliation: complete  Bernadette Melchor LPN  "

## 2021-04-12 NOTE — RESULT ENCOUNTER NOTE
Normal CBC  Stable lipids      The 10-year ASCVD risk score (Pérez ELLIOTT Jr., et al., 2013) is: 6.1%    Values used to calculate the score:      Age: 60 years      Sex: Female      Is Non- : No      Diabetic: No      Tobacco smoker: Yes      Systolic Blood Pressure: 110 mmHg      Is BP treated: No      HDL Cholesterol: 62 mg/dL      Total Cholesterol: 273 mg/dL

## 2021-04-12 NOTE — LETTER
April 14, 2021      Kait Augustin  [address] [place] [address]        Dear ,    We are writing to inform you of your test results.    Your test results fall within the expected range(s) or remain unchanged from previous results.  Please continue with current treatment plan.    Resulted Orders   CBC with platelets and differential   Result Value Ref Range    WBC 7.4 4.0 - 11.0 10e9/L    RBC Count 4.29 3.8 - 5.2 10e12/L    Hemoglobin 13.6 11.7 - 15.7 g/dL    Hematocrit 40.7 35.0 - 47.0 %    MCV 95 78 - 100 fl    MCH 31.7 26.5 - 33.0 pg    MCHC 33.4 31.5 - 36.5 g/dL    RDW 13.1 10.0 - 15.0 %    Platelet Count 203 150 - 450 10e9/L    % Neutrophils 56.4 %    % Lymphocytes 28.8 %    % Monocytes 10.2 %    % Eosinophils 3.5 %    % Basophils 1.1 %    Absolute Neutrophil 4.1 1.6 - 8.3 10e9/L    Absolute Lymphocytes 2.1 0.8 - 5.3 10e9/L    Absolute Monocytes 0.8 0.0 - 1.3 10e9/L    Absolute Eosinophils 0.3 0.0 - 0.7 10e9/L    Absolute Basophils 0.1 0.0 - 0.2 10e9/L    Diff Method Automated Method    Lipid Profile (Chol, Trig, HDL, LDL calc)   Result Value Ref Range    Cholesterol 273 (H) <200 mg/dL      Comment:      Desirable:       <200 mg/dl    Triglycerides 116 <150 mg/dL      Comment:      Fasting specimen    HDL Cholesterol 62 >49 mg/dL    LDL Cholesterol Calculated 188 (H) <100 mg/dL      Comment:      Above desirable:  100-129 mg/dl  Borderline High:  130-159 mg/dL  High:             160-189 mg/dL  Very high:       >189 mg/dl      Non HDL Cholesterol 211 (H) <130 mg/dL      Comment:      Above Desirable:  130-159 mg/dl  Borderline high:  160-189 mg/dl  High:             190-219 mg/dl  Very high:       >219 mg/dl         If you have any questions or concerns, please call the clinic at the number listed above.       Sincerely,      Itzel Phillip, CNP

## 2021-06-23 ENCOUNTER — OFFICE VISIT (OUTPATIENT)
Dept: UROLOGY | Facility: OTHER | Age: 61
End: 2021-06-23
Attending: UROLOGY
Payer: COMMERCIAL

## 2021-06-23 VITALS
WEIGHT: 128 LBS | BODY MASS INDEX: 23.41 KG/M2 | RESPIRATION RATE: 16 BRPM | OXYGEN SATURATION: 97 % | HEART RATE: 77 BPM

## 2021-06-23 DIAGNOSIS — Z85.51 HISTORY OF BLADDER CANCER: Primary | ICD-10-CM

## 2021-06-23 PROCEDURE — 99212 OFFICE O/P EST SF 10 MIN: CPT | Mod: 25 | Performed by: UROLOGY

## 2021-06-23 PROCEDURE — 52310 CYSTOSCOPY AND TREATMENT: CPT | Performed by: UROLOGY

## 2021-06-23 PROCEDURE — 88112 CYTOPATH CELL ENHANCE TECH: CPT

## 2021-06-23 ASSESSMENT — PAIN SCALES - GENERAL: PAINLEVEL: NO PAIN (0)

## 2021-06-23 NOTE — PATIENT INSTRUCTIONS

## 2021-06-23 NOTE — PROGRESS NOTES
Preoperative diagnosis  Right upper tract urothelial carcinoma    Postoperative diagnosis  Right upper tract urothelial carcinoma    Procedure  Flexible cystourethroscopy with stent removal    Surgeon  Delfino Walker MD    Anesthesia  2% lidocaine jelly intraurethrally    Complications  None    Indications  The patient is undergoing surveillance cystoscopy due to a history of bladder and upper tract urothelial carcinoma    Procedure  The patient was given one dose of antibiotics. The patient was placed in supine position and was prepped and draped in sterile fashion.  2% lidocaine jelly was bluntly injected per urethra without difficulty. I passed the 14 [place] flexible cystoscope through the urethra and into the bladder.  With the aid of a stent grasper I grasped and removed the stent in its entirety.  The patient tolerated the procedure well.    Assessment  Ms. Augustin is a 61-year-old female with a history of recurrent right upper tract urothelial carcinoma.    Plan  Follow-up cytology from today  Follow-up for surveillance cystoscopy in 3 months                A total of 10 minutes (exclusive of separately billed services/procedures) was spent with the patient, reviewing records, tests, ordering medications, tests or procedures, counseling regarding the above described medical concern, recommendations regarding management and documenting clinical information in the EHR.

## 2021-06-23 NOTE — NURSING NOTE
Patient positioned in frog leg position prior to Delfino Walker MD prepping patient with chlorhexidine gluconate cleanser.    [place] Protocol    A. Pre-procedure verification complete Yes   1-relevant information / documentation available, reviewed and properly matched to the patient; 2-consent accurate and complete, 3-equipment and supplies available    B. Site marking complete N/A  Site marked if not in continuous attendance with patient    C. TIME OUT completed Yes  Time Out was conducted just prior to starting procedure to verify the eight required elements: 1-patient identity, 2-consent accurate and complete, 3-position, 4-correct side/site marked (if applicable), 5-procedure, 6-relevant images / results properly labeled and displayed (if applicable), 7-antibiotics / irrigation fluids (if applicable), 8-safety precautions.    After procedure perineum area rinsed. Discharge instructions reviewed with patient. Patient verbalized understanding of discharge instructions and discharged ambulatory.  Amber Eason LPN..................6/23/2021  9:57 AM

## 2021-09-22 ENCOUNTER — OFFICE VISIT (OUTPATIENT)
Dept: UROLOGY | Facility: OTHER | Age: 61
End: 2021-09-22
Attending: UROLOGY
Payer: COMMERCIAL

## 2021-09-22 VITALS — OXYGEN SATURATION: 97 % | HEART RATE: 78 BPM | RESPIRATION RATE: 16 BRPM | WEIGHT: 123 LBS | BODY MASS INDEX: 22.5 KG/M2

## 2021-09-22 DIAGNOSIS — Z85.51 HISTORY OF BLADDER CANCER: Primary | ICD-10-CM

## 2021-09-22 PROCEDURE — 52000 CYSTOURETHROSCOPY: CPT | Performed by: UROLOGY

## 2021-09-22 PROCEDURE — 99212 OFFICE O/P EST SF 10 MIN: CPT | Mod: 25 | Performed by: UROLOGY

## 2021-09-22 ASSESSMENT — PAIN SCALES - GENERAL: PAINLEVEL: NO PAIN (0)

## 2021-09-22 NOTE — NURSING NOTE
Patient positioned in frog leg position prior to Delfino Walker MD prepping patient with chlorhexidine gluconate cleanser.    [place] Protocol    A. Pre-procedure verification complete Yes   1-relevant information / documentation available, reviewed and properly matched to the patient; 2-consent accurate and complete, 3-equipment and supplies available    B. Site marking complete N/A  Site marked if not in continuous attendance with patient    C. TIME OUT completed Yes  Time Out was conducted just prior to starting procedure to verify the eight required elements: 1-patient identity, 2-consent accurate and complete, 3-position, 4-correct side/site marked (if applicable), 5-procedure, 6-relevant images / results properly labeled and displayed (if applicable), 7-antibiotics / irrigation fluids (if applicable), 8-safety precautions.    After procedure perineum area rinsed. Discharge instructions reviewed with patient. Patient verbalized understanding of discharge instructions and discharged ambulatory.  Amber Eason LPN..................9/22/2021  10:56 AM

## 2021-09-22 NOTE — PROGRESS NOTES
Preoperative diagnosis  Right upper tract urothelial carcinoma  History of bladder cancer    Postoperative diagnosis  Right upper tract urothelial carcinoma  History of bladder cancer  Recurrent papillary tumor x2    Procedure  Flexible Cystourethroscopy    Surgeon  Delfino Walker MD    Anesthesia  2% lidocaine jelly intraurethrally    Complications  None    Indications  The patient is undergoing a flexible cystoscopy for the above mentioned indications.    Findings  Cystoscopic findings included a normal urethra.    The bladder appeared to be normal capacity.  There were 2 small areas of papillary tumor recurrence located near the right ureteral orifice.  There were no stones or foreign bodies.    The orifices were slit-shaped and in their normal location.    Procedure  The patient was placed in supine position and prepped and draped in sterile fashion with lidocaine jelly per urethra for anesthesia.    I passed a lubricated 14F flexible cystoscope through the urethra and into the bladder and the bladder was completely visualized.  The cystoscope was retroflexed and the bladder neck visualized.    The cystoscope was slowly withdrawn while visualizing the urethra and the procedure terminated.    The patient tolerated the procedure well.      Pathology  I personally reviewed the pathology report  3/9/2021  TURBT and ureteral mass treated    8/4/2020  TURBT and ureteral mass treated  High grade Ta    5/7/2019  High grade Ta    5/7/2019  Right upper tract with low grade Ta urothelial tumor which was ablated (no biopsy sent)    Plan  I recommended fulguration of small recurrent bladder tumors in the clinic  Follow-up for surveillance cystoscopy in 3 months                A total of 10 minutes (exclusive of separately billed services/procedures) was spent with the patient, reviewing records, tests, ordering medications, tests or procedures, counseling regarding the above described medical concern, recommendations regarding  management and documenting clinical information in the EHR.

## 2021-09-22 NOTE — PATIENT INSTRUCTIONS

## 2021-10-25 ENCOUNTER — OFFICE VISIT (OUTPATIENT)
Dept: UROLOGY | Facility: OTHER | Age: 61
End: 2021-10-25
Attending: UROLOGY
Payer: COMMERCIAL

## 2021-10-25 VITALS
WEIGHT: 122 LBS | OXYGEN SATURATION: 97 % | BODY MASS INDEX: 22.31 KG/M2 | RESPIRATION RATE: 16 BRPM | HEART RATE: 86 BPM

## 2021-10-25 DIAGNOSIS — D49.4 BLADDER TUMOR: ICD-10-CM

## 2021-10-25 DIAGNOSIS — Z85.51 HISTORY OF BLADDER CANCER: Primary | ICD-10-CM

## 2021-10-25 PROCEDURE — 52000 CYSTOURETHROSCOPY: CPT | Performed by: UROLOGY

## 2021-10-25 PROCEDURE — 99213 OFFICE O/P EST LOW 20 MIN: CPT | Mod: 25 | Performed by: UROLOGY

## 2021-10-25 RX ORDER — HYDROCODONE BITARTRATE AND ACETAMINOPHEN 5; 325 MG/1; MG/1
1-2 TABLET ORAL EVERY 4 HOURS PRN
Status: CANCELLED | OUTPATIENT
Start: 2021-10-25

## 2021-10-25 RX ORDER — CEFTRIAXONE 1 G/1
1 INJECTION, POWDER, FOR SOLUTION INTRAMUSCULAR; INTRAVENOUS
Status: CANCELLED | OUTPATIENT
Start: 2021-10-25

## 2021-10-25 ASSESSMENT — PAIN SCALES - GENERAL: PAINLEVEL: SEVERE PAIN (7)

## 2021-10-25 NOTE — H&P (VIEW-ONLY)
Assessment & Plan       Acute pain of left shoulder  - MR Shoulder Left w/o Contrast; Future  - Orthopedic  Referral; Future    Decreased range of motion - left upper extremity / shoulder  - MR Shoulder Left w/o Contrast; Future  - Orthopedic  Referral; Future    Weakness of left arm  - MR Shoulder Left w/o Contrast; Future  - Orthopedic  Referral; Future    Encounter for screening mammogram for malignant neoplasm of breast  - [facility] SCREENING DIGITAL BILATERAL ([place]); Future    Rosacea  - metroNIDAZOLE (METROGEL) 1 % external gel; Apply topically daily        Ice your shoulder  Voltaren Gel OTC      Return in about 6 months (around 4/26/2022).      Itzel Phillip, CNP  [facility] - [facility] Menchaca is a 61 year old who presents for the following health issues       Musculoskeletal problem/pain  Onset/Duration: 1 Month  Description  Location: shoulder - left  Joint Swelling: no  Redness: no  Pain: YES- 3/10 - 10/10  Warmth: no  Intensity:  moderate  Progression of Symptoms:  same  Accompanying signs and symptoms:   Fevers: no  Numbness/tingling/weakness: no  History  Trauma to the area: YES- Pt states she was shoveling in the yard a month ago and lifts repetitively at work - [employer]  Recent illness:  no  Previous similar problem: no  Previous evaluation:  no  Precipitating or alleviating factors:  Aggravating factors include: lifting, rotation and lateral movement  Therapies tried and outcome: rest/inactivity, immobilization - effective. Aspercream, alleve, heat - ineffective         PHQ 4/21/2020 10/13/2020 10/26/2021   PHQ-9 Total Score 3 7 6   Q9: Thoughts of better off dead/self-harm past 2 weeks Not at all Not at all Not at all       ADONIS-7 SCORE 4/21/2020 10/13/2020 10/26/2021   Total Score 5 10 7         Patient Active Problem List   Diagnosis     H/O renal calculi     Malignant neoplasm of overlapping sites of bladder (H)     ACP (advance care planning)     Urothelial  carcinoma of kidney, right (H)     History of bladder cancer     Bladder tumor     Recurrent malignant neoplasm of bladder (H)     Past Surgical History:   Procedure Laterality Date     C LIGATE FALLOPIAN TUBE  1996     CYSTOSCOPY, RETROGRADES, INSERT STENT URETER(S), COMBINED Right 12/8/2015    Procedure: COMBINED CYSTOSCOPY, RETROGRADES, INSERT STENT URETER(S);  Surgeon: Efren Couch MD;  Location: [facility]     CYSTOSCOPY, RETROGRADES, INSERT STENT URETER(S), COMBINED Bilateral 8/4/2020    Procedure: bilateral retrograde pyelogram;  Surgeon: Delfino Walker MD;  Location: [facility]     CYSTOSCOPY, TRANSURETHRAL RESECTION (TUR) PROSTATE, COMBINED Bilateral 5/7/2019    Procedure: Transurethral Resection of Bladder Tumor, BILATERAL RETROGRADE PYELOGRAMS, RIGHT URETEROSCOPY WITH HOLMIUM LASER TUMOR ABLATION AND STENT PLACEMENT;  Surgeon: Delfino Walker MD;  Location: [facility]     CYSTOSCOPY, TRANSURETHRAL RESECTION (TUR) TUMOR BLADDER INSTILL CHEMOTHERAPY, COMBINED N/A 8/4/2020    Procedure: Transurethral resection of bladder tumor;  Surgeon: Delfino Walker MD;  Location: [facility]     CYSTOSCOPY, TRANSURETHRAL RESECTION (TUR) TUMOR BLADDER INSTILL CHEMOTHERAPY, COMBINED Bilateral 3/9/2021    Procedure: Transurethral resection of bladder tumor, bilateral retrograde pyelograms and gemcitabine instillation;  Surgeon: Delfino Walker MD;  Location: [facility]     CYSTOSCOPY, TRANSURETHRAL RESECTION (TUR) TUMOR BLADDER, COMBINED N/A 2/10/2015    Procedure: COMBINED CYSTOSCOPY, TRANSURETHRAL RESECTION (TUR) TUMOR BLADDER;  Surgeon: Efren Couch MD;  Location: [facility]     CYSTOSCOPY, TRANSURETHRAL RESECTION (TUR) TUMOR BLADDER, COMBINED N/A 12/8/2015    Procedure: COMBINED CYSTOSCOPY, TRANSURETHRAL RESECTION (TUR) TUMOR BLADDER;  Surgeon: Efern Couch MD;  Location: [facility]     CYSTOSCOPY, URETEROSCOPY, COMBINED Right 5/28/2019    Procedure: Right Ureteroscopy, Fulgaration if Right iUpper Tract Urothelial  Carcinoma;  Surgeon: Delfino Walker MD;  Location:  OR     CYSTOSCOPY, URETEROSCOPY, COMBINED Right 8/4/2020    Procedure: diagnostic ureteroscopy,ablation of urothelial tumor, possible fulguration through flexible ureteroscope;  Surgeon: Delfino Walker MD;  Location:  OR       Social History     Tobacco Use     Smoking status: Current Every Day Smoker     Packs/day: 0.50     Years: 45.00     Pack years: 22.50     Types: Cigarettes     Smokeless tobacco: Never Used   Substance Use Topics     Alcohol use: Yes     Comment: rare     Family History   Problem Relation Age of Onset     C.A.D. Father         dx late 60's, CABG, angioplasty     Cancer Father         Lung, dx age 80 h/o tobacco use     Heart Disease Mother      Thyroid Disease Mother      Lipids Sister            Current Outpatient Medications   Medication Sig Dispense Refill     metroNIDAZOLE (METROGEL) 1 % external gel Apply topically daily 60 g 1     triamcinolone (KENALOG) 0.1 % external ointment Apply topically 2 times daily 30 g 1       Allergies   Allergen Reactions     No Known Allergies        Recent Labs   Lab Test 04/12/21  0943 03/03/21  1552 10/13/20  0908 08/24/20  1055 08/07/20  1119 08/07/20  1119 08/06/15  1508 11/17/14  1052   *  --  157*  --   --   --   --  180*   HDL 62  --  71  --   --   --   --  65   TRIG 116  --  86  --   --   --   --  128   ALT  --  28 20  --   --  20   < >  --    CR  --  0.83 0.68  --    < > 0.98   < > 0.71   GFRESTIMATED  --  77 >90  --    < > 63   < > 86   GFRESTBLACK  --  89 >90  --    < > 73   < > >90   GFR Calc     POTASSIUM  --  3.7 3.9  --    < > 3.8   < > 3.9   TSH  --   --   --  0.47  --   --   --  1.00    < > = values in this interval not displayed.        BP Readings from Last 3 Encounters:   10/26/21 122/64   04/12/21 110/62   03/09/21 119/75    Wt Readings from Last 3 Encounters:   10/26/21 55.6 kg (122 lb 8 oz)   10/25/21 55.3 kg (122 lb)   09/22/21 55.8 kg (123 lb)              Review of Systems   Constitutional, HEENT, cardiovascular, pulmonary, gi and gu systems are negative, except as otherwise noted.        Objective    /64   Pulse 70   Temp 98  F (36.7  C)   Resp 18   Wt 55.6 kg (122 lb 8 oz)   LMP 06/18/2005   SpO2 97%   BMI 22.41 kg/m    Body mass index is 22.41 kg/m .         Physical Exam   GENERAL: healthy, alert and no distress  EYES: Eyes grossly normal to inspection, PERRL and conjunctivae and sclerae normal  HENT: ear canals and TM's normal, nose and mouth without ulcers or lesions  NECK: no adenopathy, no asymmetry, masses, or scars and thyroid normal to palpation  RESP: lungs clear to auscultation - no rales, rhonchi or wheezes  CV: regular rate and rhythm, normal S1 S2, no S3 or S4, no murmur, click or rub, no peripheral edema and peripheral pulses strong  MS: Left shoulder - anterior pain, limited ROM - pain with resistance of ROM.  Weakness of left upper extremity.   SKIN: no suspicious lesions or rashes  PSYCH: mentation appears normal, affect normal/bright

## 2021-10-25 NOTE — PROGRESS NOTES
Assessment & Plan       Acute pain of left shoulder  - MR Shoulder Left w/o Contrast; Future  - Orthopedic  Referral; Future    Decreased range of motion - left upper extremity / shoulder  - MR Shoulder Left w/o Contrast; Future  - Orthopedic  Referral; Future    Weakness of left arm  - MR Shoulder Left w/o Contrast; Future  - Orthopedic  Referral; Future    Encounter for screening mammogram for malignant neoplasm of breast  - [place] SCREENING DIGITAL BILATERAL ([place]); Future    Rosacea  - metroNIDAZOLE (METROGEL) 1 % external gel; Apply topically daily        Ice your shoulder  Voltaren Gel OTC      Return in about 6 months (around 4/26/2022).      Itzel Phillip, CNP  [facility] - [facility] Menchaca is a 61 year old who presents for the following health issues       Musculoskeletal problem/pain  Onset/Duration: 1 Month  Description  Location: shoulder - left  Joint Swelling: no  Redness: no  Pain: YES- 3/10 - 10/10  Warmth: no  Intensity:  moderate  Progression of Symptoms:  same  Accompanying signs and symptoms:   Fevers: no  Numbness/tingling/weakness: no  History  Trauma to the area: YES- Pt states she was shoveling in the yard a month ago and lifts repetitively at work - [employer]  Recent illness:  no  Previous similar problem: no  Previous evaluation:  no  Precipitating or alleviating factors:  Aggravating factors include: lifting, rotation and lateral movement  Therapies tried and outcome: rest/inactivity, immobilization - effective. Aspercream, alleve, heat - ineffective         PHQ 4/21/2020 10/13/2020 10/26/2021   PHQ-9 Total Score 3 7 6   Q9: Thoughts of better off dead/self-harm past 2 weeks Not at all Not at all Not at all       ADONIS-7 SCORE 4/21/2020 10/13/2020 10/26/2021   Total Score 5 10 7         Patient Active Problem List   Diagnosis     H/O renal calculi     Malignant neoplasm of overlapping sites of bladder (H)     ACP (advance care planning)     Urothelial  carcinoma of kidney, right (H)     History of bladder cancer     Bladder tumor     Recurrent malignant neoplasm of bladder (H)     Past Surgical History:   Procedure Laterality Date     C LIGATE FALLOPIAN TUBE  1996     CYSTOSCOPY, RETROGRADES, INSERT STENT URETER(S), COMBINED Right 12/8/2015    Procedure: COMBINED CYSTOSCOPY, RETROGRADES, INSERT STENT URETER(S);  Surgeon: Efren Couch MD;  [facility]     CYSTOSCOPY, RETROGRADES, INSERT STENT URETER(S), COMBINED Bilateral 8/4/2020    Procedure: bilateral retrograde pyelogram;  Surgeon: Delfino Walker MD;  [facility]     CYSTOSCOPY, TRANSURETHRAL RESECTION (TUR) PROSTATE, COMBINED Bilateral 5/7/2019    Procedure: Transurethral Resection of Bladder Tumor, BILATERAL RETROGRADE PYELOGRAMS, RIGHT URETEROSCOPY WITH HOLMIUM LASER TUMOR ABLATION AND STENT PLACEMENT;  Surgeon: Delfino Walker MD;  Location[facility]     CYSTOSCOPY, TRANSURETHRAL RESECTION (TUR) TUMOR BLADDER INSTILL CHEMOTHERAPY, COMBINED N/A 8/4/2020    Procedure: Transurethral resection of bladder tumor;  Surgeon: Delfino Walker MD;  Location[facility]     CYSTOSCOPY, TRANSURETHRAL RESECTION (TUR) TUMOR BLADDER INSTILL CHEMOTHERAPY, COMBINED Bilateral 3/9/2021    Procedure: Transurethral resection of bladder tumor, bilateral retrograde pyelograms and gemcitabine instillation;  Surgeon: Delfino Walker MD;  Location: [facility]     CYSTOSCOPY, TRANSURETHRAL RESECTION (TUR) TUMOR BLADDER, COMBINED N/A 2/10/2015    Procedure: COMBINED CYSTOSCOPY, TRANSURETHRAL RESECTION (TUR) TUMOR BLADDER;  Surgeon: Efren Couch MD;  Location: [facility]     CYSTOSCOPY, TRANSURETHRAL RESECTION (TUR) TUMOR BLADDER, COMBINED N/A 12/8/2015    Procedure: COMBINED CYSTOSCOPY, TRANSURETHRAL RESECTION (TUR) TUMOR BLADDER;  Surgeon: Efren Couch MD;  Location: [facility]     CYSTOSCOPY, URETEROSCOPY, COMBINED Right 5/28/2019    Procedure: Right Ureteroscopy, Fulgaration if Right iUpper Tract Urothelial  Carcinoma;  Surgeon: Delfino Walker MD;  Location:  OR     CYSTOSCOPY, URETEROSCOPY, COMBINED Right 8/4/2020    Procedure: diagnostic ureteroscopy,ablation of urothelial tumor, possible fulguration through flexible ureteroscope;  Surgeon: Delfino Walker MD;  Location:  OR       Social History     Tobacco Use     Smoking status: Current Every Day Smoker     Packs/day: 0.50     Years: 45.00     Pack years: 22.50     Types: Cigarettes     Smokeless tobacco: Never Used   Substance Use Topics     Alcohol use: Yes     Comment: rare     Family History   Problem Relation Age of Onset     C.A.D. Father         dx late 60's, CABG, angioplasty     Cancer Father         Lung, dx age 80 h/o tobacco use     Heart Disease Mother      Thyroid Disease Mother      Lipids Sister            Current Outpatient Medications   Medication Sig Dispense Refill     metroNIDAZOLE (METROGEL) 1 % external gel Apply topically daily 60 g 1     triamcinolone (KENALOG) 0.1 % external ointment Apply topically 2 times daily 30 g 1       Allergies   Allergen Reactions     No Known Allergies        Recent Labs   Lab Test 04/12/21  0943 03/03/21  1552 10/13/20  0908 08/24/20  1055 08/07/20  1119 08/07/20  1119 08/06/15  1508 11/17/14  1052   *  --  157*  --   --   --   --  180*   HDL 62  --  71  --   --   --   --  65   TRIG 116  --  86  --   --   --   --  128   ALT  --  28 20  --   --  20   < >  --    CR  --  0.83 0.68  --    < > 0.98   < > 0.71   GFRESTIMATED  --  77 >90  --    < > 63   < > 86   GFRESTBLACK  --  89 >90  --    < > 73   < > >90   GFR Calc     POTASSIUM  --  3.7 3.9  --    < > 3.8   < > 3.9   TSH  --   --   --  0.47  --   --   --  1.00    < > = values in this interval not displayed.        BP Readings from Last 3 Encounters:   10/26/21 122/64   04/12/21 110/62   03/09/21 119/75    Wt Readings from Last 3 Encounters:   10/26/21 55.6 kg (122 lb 8 oz)   10/25/21 55.3 kg (122 lb)   09/22/21 55.8 kg (123 lb)              Review of Systems   Constitutional, HEENT, cardiovascular, pulmonary, gi and gu systems are negative, except as otherwise noted.        Objective    /64   Pulse 70   Temp 98  F (36.7  C)   Resp 18   Wt 55.6 kg (122 lb 8 oz)   LMP 06/18/2005   SpO2 97%   BMI 22.41 kg/m    Body mass index is 22.41 kg/m .         Physical Exam   GENERAL: healthy, alert and no distress  EYES: Eyes grossly normal to inspection, PERRL and conjunctivae and sclerae normal  HENT: ear canals and TM's normal, nose and mouth without ulcers or lesions  NECK: no adenopathy, no asymmetry, masses, or scars and thyroid normal to palpation  RESP: lungs clear to auscultation - no rales, rhonchi or wheezes  CV: regular rate and rhythm, normal S1 S2, no S3 or S4, no murmur, click or rub, no peripheral edema and peripheral pulses strong  MS: Left shoulder - anterior pain, limited ROM - pain with resistance of ROM.  Weakness of left upper extremity.   SKIN: no suspicious lesions or rashes  PSYCH: mentation appears normal, affect normal/bright

## 2021-10-25 NOTE — PROGRESS NOTES
Preoperative diagnosis  Right upper tract urothelial carcinoma  History of bladder cancer    Postoperative diagnosis  Right upper tract urothelial carcinoma  History of bladder cancer  Recurrent papillary tumor    Procedure  Flexible Cystourethroscopy    Surgeon  Delfino Walker MD    Anesthesia  2% lidocaine jelly intraurethrally    Complications  None    Indications  The patient is undergoing a flexible cystoscopy for the above mentioned indications.    Findings  Cystoscopic findings included a normal urethra.    The bladder appeared to be normal capacity.  There was a papillary tumor emanating from the left ureteral orifice.  There were no stones or foreign bodies.    The orifices were slit-shaped and in their normal location.    Procedure  The patient was placed in supine position and prepped and draped in sterile fashion with lidocaine jelly per urethra for anesthesia.    I passed a lubricated 14F flexible cystoscope through the urethra and into the bladder and the bladder was completely visualized.  The cystoscope was retroflexed and the bladder neck visualized.    The cystoscope was slowly withdrawn while visualizing the urethra and the procedure terminated.    The patient tolerated the procedure well.      Pathology  I personally reviewed the pathology report  3/9/2021  TURBT and ureteral mass treated    8/4/2020  TURBT and ureteral mass treated  High grade Ta    5/7/2019  High grade Ta    5/7/2019  Right upper tract with low grade Ta urothelial tumor which was ablated (no biopsy sent)    Assessment  61-year-old female with papillary tumor emanating from the left ureteral orifice    I recommended formal resection of the bladder tumor identified on cystoscopy.  I explained that this is a procedure performed in the operating room with anesthesia.  I explained the risks, benefits and alternatives to the procedure.  Specifically I discussed the potential for bladder perforation which could lead to conversion to open  "abdominal surgery to repair the bladder.  I also discussed that when tumors are close to the ureteral orifice there is a chance I may need to leave a stent during the period of healing.    All patient's questions were answered and the patient was consented for the procedure listed below.     Plan  The patient was consented for \"transurethral resection of bladder tumor, bilateral retrograde pyelograms, possible stent   Cold cup, semirigid and flexible ureteroscope   Muscle paralysis is not requested during anesthesia due to the size/location of tumor.                A total of 20 minutes (exclusive of separately billed services/procedures) was spent with the patient, reviewing records, tests, ordering medications, tests or procedures, counseling regarding the above described medical concern, recommendations regarding management and documenting clinical information in the EHR.   "

## 2021-10-25 NOTE — NURSING NOTE
Patient positioned in frog leg position prior to Delfino Walker MD prepping patient with chlorhexidine gluconate cleanser.    [place] Protocol    A. Pre-procedure verification complete Yes   1-relevant information / documentation available, reviewed and properly matched to the patient; 2-consent accurate and complete, 3-equipment and supplies available    B. Site marking complete N/A  Site marked if not in continuous attendance with patient    C. TIME OUT completed Yes  Time Out was conducted just prior to starting procedure to verify the eight required elements: 1-patient identity, 2-consent accurate and complete, 3-position, 4-correct side/site marked (if applicable), 5-procedure, 6-relevant images / results properly labeled and displayed (if applicable), 7-antibiotics / irrigation fluids (if applicable), 8-safety precautions.    After procedure perineum area rinsed. Discharge instructions reviewed with patient. Patient verbalized understanding of discharge instructions and discharged ambulatory.  Amber Eason LPN..................10/25/2021  8:59 AM

## 2021-10-25 NOTE — PATIENT INSTRUCTIONS

## 2021-10-26 ENCOUNTER — OFFICE VISIT (OUTPATIENT)
Dept: FAMILY MEDICINE | Facility: OTHER | Age: 61
End: 2021-10-26
Attending: NURSE PRACTITIONER
Payer: COMMERCIAL

## 2021-10-26 VITALS
TEMPERATURE: 98 F | DIASTOLIC BLOOD PRESSURE: 64 MMHG | RESPIRATION RATE: 18 BRPM | HEART RATE: 70 BPM | SYSTOLIC BLOOD PRESSURE: 122 MMHG | OXYGEN SATURATION: 97 % | BODY MASS INDEX: 22.41 KG/M2 | WEIGHT: 122.5 LBS

## 2021-10-26 DIAGNOSIS — Z12.31 ENCOUNTER FOR SCREENING MAMMOGRAM FOR MALIGNANT NEOPLASM OF BREAST: Primary | ICD-10-CM

## 2021-10-26 DIAGNOSIS — M25.60 DECREASED RANGE OF MOTION: ICD-10-CM

## 2021-10-26 DIAGNOSIS — R29.898 WEAKNESS OF LEFT ARM: ICD-10-CM

## 2021-10-26 DIAGNOSIS — M25.512 ACUTE PAIN OF LEFT SHOULDER: ICD-10-CM

## 2021-10-26 DIAGNOSIS — L71.9 ROSACEA: ICD-10-CM

## 2021-10-26 PROCEDURE — 99214 OFFICE O/P EST MOD 30 MIN: CPT | Performed by: NURSE PRACTITIONER

## 2021-10-26 RX ORDER — METRONIDAZOLE 10 MG/G
GEL TOPICAL DAILY
Qty: 60 G | Refills: 1 | Status: SHIPPED | OUTPATIENT
Start: 2021-10-26

## 2021-10-26 ASSESSMENT — ANXIETY QUESTIONNAIRES
2. NOT BEING ABLE TO STOP OR CONTROL WORRYING: MORE THAN HALF THE DAYS
4. TROUBLE RELAXING: NOT AT ALL
GAD7 TOTAL SCORE: 7
1. FEELING NERVOUS, ANXIOUS, OR ON EDGE: SEVERAL DAYS
6. BECOMING EASILY ANNOYED OR IRRITABLE: MORE THAN HALF THE DAYS
IF YOU CHECKED OFF ANY PROBLEMS ON THIS QUESTIONNAIRE, HOW DIFFICULT HAVE THESE PROBLEMS MADE IT FOR YOU TO DO YOUR WORK, TAKE CARE OF THINGS AT HOME, OR GET ALONG WITH OTHER PEOPLE: SOMEWHAT DIFFICULT
7. FEELING AFRAID AS IF SOMETHING AWFUL MIGHT HAPPEN: NOT AT ALL
5. BEING SO RESTLESS THAT IT IS HARD TO SIT STILL: NOT AT ALL
3. WORRYING TOO MUCH ABOUT DIFFERENT THINGS: MORE THAN HALF THE DAYS

## 2021-10-26 ASSESSMENT — PAIN SCALES - GENERAL: PAINLEVEL: MILD PAIN (3)

## 2021-10-26 ASSESSMENT — PATIENT HEALTH QUESTIONNAIRE - PHQ9: SUM OF ALL RESPONSES TO PHQ QUESTIONS 1-9: 6

## 2021-10-26 NOTE — PATIENT INSTRUCTIONS
Assessment & Plan       Acute pain of left shoulder  - MR Shoulder Left w/o Contrast; Future  - Orthopedic  Referral; Future    Decreased range of motion - left upper extremity / shoulder  - MR Shoulder Left w/o Contrast; Future  - Orthopedic  Referral; Future    Weakness of left arm  - MR Shoulder Left w/o Contrast; Future  - Orthopedic  Referral; Future    Encounter for screening mammogram for malignant neoplasm of breast  - MA SCREENING DIGITAL BILATERAL ([place]); Future    Rosacea  - metroNIDAZOLE (METROGEL) 1 % external gel; Apply topically daily      Ice your shoulder  Voltaren Gel OTC    Return in about 6 months (around 4/26/2022).      Itzel Phillip, CNP

## 2021-10-26 NOTE — NURSING NOTE
"Chief Complaint   Patient presents with     Musculoskeletal Problem       Initial /64   Pulse 70   Temp 98  F (36.7  C)   Resp 18   Wt 55.6 kg (122 lb 8 oz)   LMP 06/18/2005   SpO2 97%   BMI 22.41 kg/m   Estimated body mass index is 22.41 kg/m  as calculated from the following:    Height as of 3/5/21: 1.575 m (5' 2\").    Weight as of this encounter: 55.6 kg (122 lb 8 oz).  Medication Reconciliation: michelle Lara  "

## 2021-10-27 ASSESSMENT — ANXIETY QUESTIONNAIRES: GAD7 TOTAL SCORE: 7

## 2021-10-29 ENCOUNTER — OFFICE VISIT (OUTPATIENT)
Dept: FAMILY MEDICINE | Facility: OTHER | Age: 61
End: 2021-10-29
Attending: UROLOGY
Payer: COMMERCIAL

## 2021-10-29 DIAGNOSIS — Z01.818 PRE-OP EXAM: Primary | ICD-10-CM

## 2021-10-29 PROCEDURE — U0003 INFECTIOUS AGENT DETECTION BY NUCLEIC ACID (DNA OR RNA); SEVERE ACUTE RESPIRATORY SYNDROME CORONAVIRUS 2 (SARS-COV-2) (CORONAVIRUS DISEASE [COVID-19]), AMPLIFIED PROBE TECHNIQUE, MAKING USE OF HIGH THROUGHPUT TECHNOLOGIES AS DESCRIBED BY CMS-2020-01-R: HCPCS

## 2021-10-29 PROCEDURE — U0005 INFEC AGEN DETEC AMPLI PROBE: HCPCS

## 2021-10-30 LAB — SARS-COV-2 RNA RESP QL NAA+PROBE: NEGATIVE

## 2021-11-01 ENCOUNTER — ANESTHESIA EVENT (OUTPATIENT)
Dept: SURGERY | Facility: OTHER | Age: 61
End: 2021-11-01
Payer: COMMERCIAL

## 2021-11-02 ENCOUNTER — ANESTHESIA (OUTPATIENT)
Dept: SURGERY | Facility: OTHER | Age: 61
End: 2021-11-02
Payer: COMMERCIAL

## 2021-11-02 ENCOUNTER — HOSPITAL ENCOUNTER (OUTPATIENT)
Dept: GENERAL RADIOLOGY | Facility: OTHER | Age: 61
End: 2021-11-02
Attending: UROLOGY | Admitting: UROLOGY
Payer: COMMERCIAL

## 2021-11-02 ENCOUNTER — HOSPITAL ENCOUNTER (OUTPATIENT)
Facility: OTHER | Age: 61
Discharge: HOME | End: 2021-11-02
Attending: UROLOGY | Admitting: UROLOGY
Payer: COMMERCIAL

## 2021-11-02 VITALS
DIASTOLIC BLOOD PRESSURE: 74 MMHG | RESPIRATION RATE: 16 BRPM | TEMPERATURE: 97.2 F | WEIGHT: 122 LBS | OXYGEN SATURATION: 96 % | BODY MASS INDEX: 22.31 KG/M2 | SYSTOLIC BLOOD PRESSURE: 136 MMHG | HEART RATE: 75 BPM

## 2021-11-02 DIAGNOSIS — D49.4 BLADDER TUMOR: ICD-10-CM

## 2021-11-02 PROCEDURE — 250N000011 HC RX IP 250 OP 636: Performed by: UROLOGY

## 2021-11-02 PROCEDURE — 272N000001 HC OR GENERAL SUPPLY STERILE: Performed by: UROLOGY

## 2021-11-02 PROCEDURE — 360N000083 HC SURGERY LEVEL 3 W/ FLUORO, PER MIN: Performed by: UROLOGY

## 2021-11-02 PROCEDURE — 370N000017 HC ANESTHESIA TECHNICAL FEE, PER MIN: Performed by: UROLOGY

## 2021-11-02 PROCEDURE — 999N000180 XR SURGERY CARM FLUORO LESS THAN 5 MIN

## 2021-11-02 PROCEDURE — 999N000141 HC STATISTIC PRE-PROCEDURE NURSING ASSESSMENT: Performed by: UROLOGY

## 2021-11-02 PROCEDURE — 250N000009 HC RX 250: Performed by: UROLOGY

## 2021-11-02 PROCEDURE — 258N000001 HC RX 258: Performed by: UROLOGY

## 2021-11-02 PROCEDURE — 710N000010 HC RECOVERY PHASE 1, LEVEL 2, PER MIN: Performed by: UROLOGY

## 2021-11-02 PROCEDURE — 250N000009 HC RX 250: Performed by: NURSE ANESTHETIST, CERTIFIED REGISTERED

## 2021-11-02 PROCEDURE — 88307 TISSUE EXAM BY PATHOLOGIST: CPT

## 2021-11-02 PROCEDURE — 51720 TREATMENT OF BLADDER LESION: CPT | Mod: XU

## 2021-11-02 PROCEDURE — C1758 CATHETER, URETERAL: HCPCS | Performed by: UROLOGY

## 2021-11-02 PROCEDURE — 258N000003 HC RX IP 258 OP 636: Performed by: NURSE ANESTHETIST, CERTIFIED REGISTERED

## 2021-11-02 PROCEDURE — 51720 TREATMENT OF BLADDER LESION: CPT

## 2021-11-02 PROCEDURE — 250N000011 HC RX IP 250 OP 636: Performed by: NURSE ANESTHETIST, CERTIFIED REGISTERED

## 2021-11-02 PROCEDURE — C2617 STENT, NON-COR, TEM W/O DEL: HCPCS | Performed by: UROLOGY

## 2021-11-02 PROCEDURE — 710N000012 HC RECOVERY PHASE 2, PER MINUTE: Performed by: UROLOGY

## 2021-11-02 PROCEDURE — 51720 TREATMENT OF BLADDER LESION: CPT | Mod: XU | Performed by: UROLOGY

## 2021-11-02 PROCEDURE — 52235 CYSTOSCOPY AND TREATMENT: CPT | Performed by: UROLOGY

## 2021-11-02 PROCEDURE — 52332 CYSTOSCOPY AND TREATMENT: CPT | Mod: XS | Performed by: UROLOGY

## 2021-11-02 PROCEDURE — 258N000003 HC RX IP 258 OP 636: Performed by: UROLOGY

## 2021-11-02 PROCEDURE — C1769 GUIDE WIRE: HCPCS | Performed by: UROLOGY

## 2021-11-02 PROCEDURE — 52235 CYSTOSCOPY AND TREATMENT: CPT | Performed by: NURSE ANESTHETIST, CERTIFIED REGISTERED

## 2021-11-02 DEVICE — URETERAL STENT
Type: IMPLANTABLE DEVICE | Site: BLADDER | Status: FUNCTIONAL
Brand: CONTOUR™

## 2021-11-02 RX ORDER — ATROPA BELLADONNA AND OPIUM 16.2; 3 MG/1; MG/1
SUPPOSITORY RECTAL PRN
Status: DISCONTINUED | OUTPATIENT
Start: 2021-11-02 | End: 2021-11-02 | Stop reason: HOSPADM

## 2021-11-02 RX ORDER — FENTANYL CITRATE 50 UG/ML
50 INJECTION, SOLUTION INTRAMUSCULAR; INTRAVENOUS
Status: DISCONTINUED | OUTPATIENT
Start: 2021-11-02 | End: 2021-11-02 | Stop reason: HOSPADM

## 2021-11-02 RX ORDER — ONDANSETRON 2 MG/ML
4 INJECTION INTRAMUSCULAR; INTRAVENOUS EVERY 30 MIN PRN
Status: DISCONTINUED | OUTPATIENT
Start: 2021-11-02 | End: 2021-11-02 | Stop reason: HOSPADM

## 2021-11-02 RX ORDER — DEXAMETHASONE SODIUM PHOSPHATE 4 MG/ML
INJECTION, SOLUTION INTRA-ARTICULAR; INTRALESIONAL; INTRAMUSCULAR; INTRAVENOUS; SOFT TISSUE PRN
Status: DISCONTINUED | OUTPATIENT
Start: 2021-11-02 | End: 2021-11-02

## 2021-11-02 RX ORDER — NALOXONE HYDROCHLORIDE 0.4 MG/ML
0.2 INJECTION, SOLUTION INTRAMUSCULAR; INTRAVENOUS; SUBCUTANEOUS
Status: DISCONTINUED | OUTPATIENT
Start: 2021-11-02 | End: 2021-11-02 | Stop reason: HOSPADM

## 2021-11-02 RX ORDER — MEPERIDINE HYDROCHLORIDE 50 MG/ML
12.5 INJECTION INTRAMUSCULAR; INTRAVENOUS; SUBCUTANEOUS
Status: DISCONTINUED | OUTPATIENT
Start: 2021-11-02 | End: 2021-11-02 | Stop reason: HOSPADM

## 2021-11-02 RX ORDER — CEFTRIAXONE SODIUM 1 G/50ML
1 INJECTION, SOLUTION INTRAVENOUS
Status: COMPLETED | OUTPATIENT
Start: 2021-11-02 | End: 2021-11-02

## 2021-11-02 RX ORDER — PROPOFOL 10 MG/ML
INJECTION, EMULSION INTRAVENOUS PRN
Status: DISCONTINUED | OUTPATIENT
Start: 2021-11-02 | End: 2021-11-02

## 2021-11-02 RX ORDER — NALOXONE HYDROCHLORIDE 0.4 MG/ML
0.4 INJECTION, SOLUTION INTRAMUSCULAR; INTRAVENOUS; SUBCUTANEOUS
Status: DISCONTINUED | OUTPATIENT
Start: 2021-11-02 | End: 2021-11-02 | Stop reason: HOSPADM

## 2021-11-02 RX ORDER — HYDROMORPHONE HYDROCHLORIDE 1 MG/ML
0.4 INJECTION, SOLUTION INTRAMUSCULAR; INTRAVENOUS; SUBCUTANEOUS EVERY 5 MIN PRN
Status: DISCONTINUED | OUTPATIENT
Start: 2021-11-02 | End: 2021-11-02 | Stop reason: HOSPADM

## 2021-11-02 RX ORDER — SODIUM CHLORIDE 9 MG/ML
INJECTION, SOLUTION INTRAVENOUS CONTINUOUS PRN
Status: DISCONTINUED | OUTPATIENT
Start: 2021-11-02 | End: 2021-11-02

## 2021-11-02 RX ORDER — ONDANSETRON 4 MG/1
4 TABLET, ORALLY DISINTEGRATING ORAL EVERY 30 MIN PRN
Status: DISCONTINUED | OUTPATIENT
Start: 2021-11-02 | End: 2021-11-02 | Stop reason: HOSPADM

## 2021-11-02 RX ORDER — HYDROCODONE BITARTRATE AND ACETAMINOPHEN 5; 325 MG/1; MG/1
1-2 TABLET ORAL EVERY 4 HOURS PRN
Status: DISCONTINUED | OUTPATIENT
Start: 2021-11-02 | End: 2021-11-02 | Stop reason: HOSPADM

## 2021-11-02 RX ORDER — LIDOCAINE 40 MG/G
CREAM TOPICAL
Status: DISCONTINUED | OUTPATIENT
Start: 2021-11-02 | End: 2021-11-02 | Stop reason: HOSPADM

## 2021-11-02 RX ORDER — PROPOFOL 10 MG/ML
INJECTION, EMULSION INTRAVENOUS CONTINUOUS PRN
Status: DISCONTINUED | OUTPATIENT
Start: 2021-11-02 | End: 2021-11-02

## 2021-11-02 RX ORDER — FENTANYL CITRATE 50 UG/ML
50 INJECTION, SOLUTION INTRAMUSCULAR; INTRAVENOUS EVERY 5 MIN PRN
Status: DISCONTINUED | OUTPATIENT
Start: 2021-11-02 | End: 2021-11-02 | Stop reason: HOSPADM

## 2021-11-02 RX ORDER — SODIUM CHLORIDE 9 MG/ML
INJECTION, SOLUTION INTRAVENOUS CONTINUOUS
Status: DISCONTINUED | OUTPATIENT
Start: 2021-11-02 | End: 2021-11-02 | Stop reason: HOSPADM

## 2021-11-02 RX ORDER — KETAMINE HYDROCHLORIDE 10 MG/ML
INJECTION INTRAMUSCULAR; INTRAVENOUS PRN
Status: DISCONTINUED | OUTPATIENT
Start: 2021-11-02 | End: 2021-11-02

## 2021-11-02 RX ORDER — SCOLOPAMINE TRANSDERMAL SYSTEM 1 MG/1
1 PATCH, EXTENDED RELEASE TRANSDERMAL ONCE
Status: DISCONTINUED | OUTPATIENT
Start: 2021-11-02 | End: 2021-11-02 | Stop reason: HOSPADM

## 2021-11-02 RX ORDER — FENTANYL CITRATE 50 UG/ML
INJECTION, SOLUTION INTRAMUSCULAR; INTRAVENOUS PRN
Status: DISCONTINUED | OUTPATIENT
Start: 2021-11-02 | End: 2021-11-02

## 2021-11-02 RX ORDER — LIDOCAINE HYDROCHLORIDE 20 MG/ML
INJECTION, SOLUTION INFILTRATION; PERINEURAL PRN
Status: DISCONTINUED | OUTPATIENT
Start: 2021-11-02 | End: 2021-11-02

## 2021-11-02 RX ORDER — ONDANSETRON 2 MG/ML
INJECTION INTRAMUSCULAR; INTRAVENOUS PRN
Status: DISCONTINUED | OUTPATIENT
Start: 2021-11-02 | End: 2021-11-02

## 2021-11-02 RX ORDER — OXYCODONE HYDROCHLORIDE 5 MG/1
5 TABLET ORAL EVERY 4 HOURS PRN
Status: DISCONTINUED | OUTPATIENT
Start: 2021-11-02 | End: 2021-11-02 | Stop reason: HOSPADM

## 2021-11-02 RX ADMIN — PROPOFOL 150 MG: 10 INJECTION, EMULSION INTRAVENOUS at 10:11

## 2021-11-02 RX ADMIN — FENTANYL CITRATE 50 MCG: 50 INJECTION, SOLUTION INTRAMUSCULAR; INTRAVENOUS at 10:09

## 2021-11-02 RX ADMIN — DEXAMETHASONE SODIUM PHOSPHATE 8 MG: 4 INJECTION, SOLUTION INTRAMUSCULAR; INTRAVENOUS at 10:23

## 2021-11-02 RX ADMIN — SODIUM CHLORIDE: 900 INJECTION, SOLUTION INTRAVENOUS at 10:07

## 2021-11-02 RX ADMIN — Medication 10 MG: at 10:21

## 2021-11-02 RX ADMIN — Medication 10 MG: at 10:13

## 2021-11-02 RX ADMIN — LIDOCAINE HYDROCHLORIDE 60 MG: 20 INJECTION, SOLUTION INFILTRATION; PERINEURAL at 10:11

## 2021-11-02 RX ADMIN — SODIUM CHLORIDE: 9 INJECTION, SOLUTION INTRAVENOUS at 09:08

## 2021-11-02 RX ADMIN — MIDAZOLAM HYDROCHLORIDE 2 MG: 1 INJECTION, SOLUTION INTRAMUSCULAR; INTRAVENOUS at 10:09

## 2021-11-02 RX ADMIN — SODIUM CHLORIDE 12 MCG: 900 INJECTION INTRAVENOUS at 10:15

## 2021-11-02 RX ADMIN — ONDANSETRON HYDROCHLORIDE 4 MG: 2 SOLUTION INTRAMUSCULAR; INTRAVENOUS at 10:23

## 2021-11-02 RX ADMIN — PROPOFOL 130 MCG/KG/MIN: 10 INJECTION, EMULSION INTRAVENOUS at 10:13

## 2021-11-02 RX ADMIN — SCOPALAMINE 1 PATCH: 1 PATCH, EXTENDED RELEASE TRANSDERMAL at 08:54

## 2021-11-02 RX ADMIN — SODIUM CHLORIDE 2000 MG: 9 INJECTION, SOLUTION INTRAMUSCULAR; INTRAVENOUS; SUBCUTANEOUS at 11:18

## 2021-11-02 RX ADMIN — CEFTRIAXONE SODIUM 1 G: 1 INJECTION, SOLUTION INTRAVENOUS at 10:09

## 2021-11-02 ASSESSMENT — LIFESTYLE VARIABLES: TOBACCO_USE: 1

## 2021-11-02 NOTE — PROGRESS NOTES
Patient insisted on getting the bateman removed.  It was taken out 10 minutes early without difficulty.

## 2021-11-02 NOTE — ANESTHESIA CARE TRANSFER NOTE
Patient: Kait Augustin    Procedure: Procedure(s):  Transurethral resection of bladder tumor and bilateral retrograde pyelograms with left stent placement       Diagnosis: Bladder tumor [D49.4]  Diagnosis Additional Information: No value filed.    Anesthesia Type:   General     Note:      Level of Consciousness: drowsy  Oxygen Supplementation: face mask  Level of Supplemental Oxygen (L/min / FiO2): 8 L/min  Independent Airway: airway patency satisfactory and stable  Dentition: dentition unchanged  Vital Signs Stable: post-procedure vital signs reviewed and stable  Report to RN Given: handoff report given  Patient transferred to: PACU    Handoff Report: Identifed the Patient, Identified the Reponsible Provider, Reviewed the pertinent medical history, Discussed the surgical course, Reviewed Intra-OP anesthesia mangement and issues during anesthesia, Set expectations for post-procedure period and Allowed opportunity for questions and acknowledgement of understanding      Vitals:  Vitals Value Taken Time   /58 11/02/21 1100   Temp 97.3  F (36.3  C) 11/02/21 1053   Pulse 73 11/02/21 1100   Resp 23 11/02/21 1101   SpO2 98 % 11/02/21 1101   Vitals shown include unvalidated device data.    Electronically Signed By: David Kellerman, APRN CRNA  November 2, 2021  11:02 AM

## 2021-11-02 NOTE — OP NOTE
Preoperative diagnosis  Bladder tumor    Postoperative diagnosis  Bladder tumor    Procedure performed  Transurethral resection of bladder tumor, 2-5cm  Cystoscopy with bilateral  retrograde pyelogram and left stent placement  Interpretation of retrograde, bilateral    Surgeon  Delfino Walker MD    Surgeon(s)/Proceduralist(s) and Assistants (if any)  Surgeon(s):  Delfino Walker MD  Circulator: Molly No RN  Scrub Person: Shaun Cason  X-Ray Technologist: Lacey Ch  2nd Scrub: Mae Desir RN    Specimen(s)  Yes- Bladder tumor    (EBL) Estimated blood loss (ml)  5    Anesthesia  General    Complications  None    Findings  1cm papillary bladder tumor eminating from the left ureteral orifice with affected urothelium adjacent to UO.   Tumor was resected and the remaining urothelium was fulgurated.  Total area treated was 2cm.    Left retrograde pyelogram revealed a delicate system with normal caliber ureter with no filling defects.    No filling defects with the renal pelvis or calyces.    Right retrograde pyelogram revealed a delicate system with normal caliber ureter with no filling defects.    No filling defects with the renal pelvis or calyces.    Indications  61 year old female agreed to undergo the above named procedure after discussion of the alternatives, risks and benefits.    The patient was found to have a bladder tumor on cystoscopy.  Informed consent was obtained.      Procedure  The patient was taken to the operating room and placed supine on the operating table.  Pre-operative antibiotics were administered and bilateral lower extremity SCDs were placed.    After induction of general anesthesia the patient was positioned in dorsal lithotomy.    A time-out was performed and the patient was prepped and draped in a sterile fashion.      A 22 Turkmen rigid cystoscope was introduced into the bladder under direct vision and cystoscopy was performed.    A Sensor wire was passed through the  right  ureteral orifice and a 5 Tuvaluan catheter was passed over the wire and the wire was removed.    A retrograde pyelogram was performed by slowly injecting Omnipaque contrast, 5 mL, through the 5 Tuvaluan catheter.    The entire collecting system was fluoroscopically visualized with findings described above.      The 5 Tuvaluan catheter was then passed through the left ureteral orifice and the same procedure was performed as described above.    The entire collecting system was fluoroscopically visualized with findings described above.      A 27 Tuvaluan continuous flow bipolar resectoscope was introduced into the bladder.   Using a working element, the described mass was resected.    This was felt to be a complete resection of visible tumor.      A Sensor wire was passed through the UO to the kidney.  A 6 x 24F ureteral stent as passed retrograde under fluoroscopic guidance with coil in bladder confirmed visually.    Hemostasis was confirmed and a 20F catheter was placed in a sterile fashion.      The patient tolerated the procedure well, was awakened, extubated and transferred to the recovery room in stable condition.    Plan  Follow up in clinic in 2 weeks for a stent pull.

## 2021-11-02 NOTE — ANESTHESIA PROCEDURE NOTES
Airway       Patient location during procedure: OR       Procedure Start/Stop Times: 11/2/2021 10:11 AM  Staff -        CRNA: Kellerman, David, APRN CRNA       Performed By: CRNA  Consent for Airway        Urgency: elective  Indications and Patient Condition       Indications for airway management: sangita-procedural         Mask difficulty assessment: 0 - not attempted    Final Airway Details       Final airway type: supraglottic airway    Supraglottic Airway Details        Type: LMA       Brand: I-Gel       LMA size: 3    Post intubation assessment        Placement verified by: capnometry, equal breath sounds and chest rise        Number of attempts at approach: 1       Ease of procedure: easy       Dentition: Intact and Unchanged

## 2021-11-02 NOTE — ANESTHESIA POSTPROCEDURE EVALUATION
Patient: Kait Augustin    Procedure: Procedure(s):  Transurethral resection of bladder tumor and bilateral retrograde pyelograms with left stent placement       Diagnosis:Bladder tumor [D49.4]  Diagnosis Additional Information: No value filed.    Anesthesia Type:  General    Note:  Disposition: Outpatient   Postop Pain Control: Uneventful            Sign Out: Well controlled pain   PONV: No   Neuro/Psych: Uneventful            Sign Out: Acceptable/Baseline neuro status   Airway/Respiratory: Uneventful            Sign Out: Acceptable/Baseline resp. status   CV/Hemodynamics: Uneventful            Sign Out: Acceptable CV status; No obvious hypovolemia; No obvious fluid overload   Other NRE: NONE   DID A NON-ROUTINE EVENT OCCUR? No           Last vitals:  Vitals Value Taken Time   /70 11/02/21 1135   Temp 97.4  F (36.3  C) 11/02/21 1135   Pulse 79 11/02/21 1138   Resp 15 11/02/21 1138   SpO2 95 % 11/02/21 1138   Vitals shown include unvalidated device data.    Electronically Signed By: AISSATOU Hanley CRNA  November 2, 2021  2:00 PM

## 2021-11-02 NOTE — OR NURSING
Gemcitabine given via bateman per Dr. Walker. Clamp applied by RN. Instructed to leave batmean tubing clamped for 1 hour and then drain - drain time will be 1220.

## 2021-11-02 NOTE — PROCEDURES
Preprocedure diagnosis  Bladder tumor  History of bladder cancer    Postprocedure diagnosis  Bladder tumor  History of bladder cancer    Procedure  Instillation of intravesical chemotherapy    Surgeon  Delfino Walker MD    Anesthesia  None    Complications  None    Indication  Kait Augustin is a 61 year old female who underwent TURBT in the OR today.  I recommended post-op intravesical chemo instillation.  Tumor appeared low grade and likely will not proceed to induction intravesical therapy.  TURBT went well and there was no obvious bladder perforation identified during the case.    Procedure  The patient was in the PACU with indwelling catheter in place.  I instilled gemcitabine 2000 mg in 100ml of normal saline via catheter and clamped the catheter.    Plan  Drain after 1 hour dwell time (12:30)  Patient will be discharged with catheter in place.

## 2021-11-02 NOTE — DISCHARGE INSTRUCTIONS
[facility]  Adult Discharge Orders & Instructions    ________________________________________________________________          For 12 hours after surgery  1. Get plenty of rest.  A responsible adult must stay with you for at least 12 hours after you leave the hospital.   2. You may feel lightheaded.  IF so, sit for a few minutes before standing.  Have someone help you get up.   3. You may have a slight fever. Call the doctor if your fever is over 101 F (38.3 C) (taken under the tongue) or lasts longer than 24 hours.  4. You may have a dry mouth, a sore throat, muscle aches or trouble sleeping.  These should go away after 24 hours.  5. Do not make important or legal decisions.  6.   Do not drive or use heavy equipment.  If you have weakness or tingling, don't drive or use heavy equipment until this feeling goes away.    To contact a doctor, call   [phone]_______________________

## 2021-11-02 NOTE — OR NURSING
PACU Transfer Note    Kait Augustin was transferred to [facility] via cart.  Equipment used for transport:  none.  Accompanied by:  Luzma HURLEY  Prescriptions were: n/a    PACU Respiratory Event Documentation     1) Episodes of Apnea greater than or equal to 10 seconds: 0    2) Bradypnea - less than 8 breaths per minute: 0    3) Pain score on 0 to 10 scale: 0    4) Pain-sedation mismatch (yes or no): no    5) Repeated 02 desaturation less than 90% (yes or no): no    Anesthesia notified? (yes or no): n/a    Any of the above events occuring repeatedly in separate 30 minute intervals may be considered recurrent PACU respiratory events.    Patient stable and meets phase 1 discharge criteria for transport from PACU.

## 2021-11-02 NOTE — PROGRESS NOTES
Patient uncomfortable post bladder instillation.  Dr. Walker in to talk with her.  He did agree that she could have her bateman out when the instillation time was up.

## 2021-11-02 NOTE — ANESTHESIA PREPROCEDURE EVALUATION
Anesthesia Pre-Procedure Evaluation    Patient: Kait Augustin   MRN: [id number] : 1960        Preoperative Diagnosis: Bladder tumor [D49.4]    Procedure : Procedure(s):  Transurethral resection of bladder tumor and bilateral retrograde pyelograms          Past Medical History:   Diagnosis Date     Cancer (H)     Bladder Cancer     H/O renal calculi 2014     MIGRAINE HEADACHES      Nicotine dependence 2014     PONV (postoperative nausea and vomiting)      Tobacco abuse 2014      Past Surgical History:   Procedure Laterality Date     C LIGATE FALLOPIAN TUBE       CYSTOSCOPY, RETROGRADES, INSERT STENT URETER(S), COMBINED Right 2015    Procedure: COMBINED CYSTOSCOPY, RETROGRADES, INSERT STENT URETER(S);  Surgeon: Efren Couch MD;  Location: [facility] OR     CYSTOSCOPY, RETROGRADES, INSERT STENT URETER(S), COMBINED Bilateral 2020    Procedure: bilateral retrograde pyelogram;  Surgeon: Delfino Walker MD;  Location: [facility] OR     CYSTOSCOPY, TRANSURETHRAL RESECTION (TUR) PROSTATE, COMBINED Bilateral 2019    Procedure: Transurethral Resection of Bladder Tumor, BILATERAL RETROGRADE PYELOGRAMS, RIGHT URETEROSCOPY WITH HOLMIUM LASER TUMOR ABLATION AND STENT PLACEMENT;  Surgeon: Delfino Walker MD;  Location: [facility] OR     CYSTOSCOPY, TRANSURETHRAL RESECTION (TUR) TUMOR BLADDER INSTILL CHEMOTHERAPY, COMBINED N/A 2020    Procedure: Transurethral resection of bladder tumor;  Surgeon: Delfino Walker MD;  Location: [facility] OR     CYSTOSCOPY, TRANSURETHRAL RESECTION (TUR) TUMOR BLADDER INSTILL CHEMOTHERAPY, COMBINED Bilateral 3/9/2021    Procedure: Transurethral resection of bladder tumor, bilateral retrograde pyelograms and gemcitabine instillation;  Surgeon: Delfino Walker MD;  Location: [facility] OR     CYSTOSCOPY, TRANSURETHRAL RESECTION (TUR) TUMOR BLADDER, COMBINED N/A 2/10/2015    Procedure: COMBINED CYSTOSCOPY, TRANSURETHRAL RESECTION (TUR) TUMOR BLADDER;  Surgeon: Khoa  Efren Fowler MD;  Location: [facility] OR     CYSTOSCOPY, TRANSURETHRAL RESECTION (TUR) TUMOR BLADDER, COMBINED N/A 12/8/2015    Procedure: COMBINED CYSTOSCOPY, TRANSURETHRAL RESECTION (TUR) TUMOR BLADDER;  Surgeon: Efren Couch MD;  Location: [facility] OR     CYSTOSCOPY, URETEROSCOPY, COMBINED Right 5/28/2019    Procedure: Right Ureteroscopy, Fulgaration if Right iUpper Tract Urothelial Carcinoma;  Surgeon: Delfino Walker MD;  Location: [facility] OR     CYSTOSCOPY, URETEROSCOPY, COMBINED Right 8/4/2020    Procedure: diagnostic ureteroscopy,ablation of urothelial tumor, possible fulguration through flexible ureteroscope;  Surgeon: Delfino Walker MD;  Location: [facility] OR      Allergies   Allergen Reactions     No Known Allergies       Social History     Tobacco Use     Smoking status: Current Every Day Smoker     Packs/day: 0.50     Years: 45.00     Pack years: 22.50     Types: Cigarettes     Smokeless tobacco: Never Used   Substance Use Topics     Alcohol use: Yes     Comment: rare      Wt Readings from Last 1 Encounters:   10/26/21 55.6 kg (122 lb 8 oz)        Anesthesia Evaluation   Pt has had prior anesthetic.     History of anesthetic complications  - PONV.      ROS/MED HX  ENT/Pulmonary:     (+) tobacco use, Current use, 1 packs/day,     Neurologic:     (+) migraines,     Cardiovascular:  - neg cardiovascular ROS     METS/Exercise Tolerance: 4 - Raking leaves, gardening    Hematologic:  - neg hematologic  ROS     Musculoskeletal:  - neg musculoskeletal ROS     GI/Hepatic:  - neg GI/hepatic ROS     Renal/Genitourinary: Comment: Hx Urothelial carcinoma of kidney      Endo:  - neg endo ROS     Psychiatric/Substance Use:  - neg psychiatric ROS     Infectious Disease:  - neg infectious disease ROS     Malignancy:   (+) Malignancy, History of Other.Other CA Bladder Active status post Surgery.    Other:  - neg other ROS          Physical Exam    Airway        Mallampati: II   TM distance: > 3 FB   Neck ROM: full   Mouth  opening: > 3 cm    Respiratory Devices and Support         Dental  no notable dental history         Cardiovascular   cardiovascular exam normal       Rhythm and rate: regular and normal     Pulmonary   pulmonary exam normal        breath sounds clear to auscultation           OUTSIDE LABS:  CBC:   Lab Results   Component Value Date    WBC 7.4 04/12/2021    WBC 9.0 03/03/2021    HGB 13.6 04/12/2021    HGB 13.3 03/03/2021    HCT 40.7 04/12/2021    HCT 39.2 03/03/2021     04/12/2021     03/03/2021     BMP:   Lab Results   Component Value Date     03/03/2021     10/13/2020    POTASSIUM 3.7 03/03/2021    POTASSIUM 3.9 10/13/2020    CHLORIDE 107 03/03/2021    CHLORIDE 113 (H) 10/13/2020    CO2 28 03/03/2021    CO2 24 10/13/2020    BUN 17 03/03/2021    BUN 14 10/13/2020    CR 0.83 03/03/2021    CR 0.68 10/13/2020     (H) 03/03/2021     (H) 10/13/2020     COAGS:   Lab Results   Component Value Date    INR 1.0 01/23/2015     POC: No results found for: BGM, HCG, HCGS  HEPATIC:   Lab Results   Component Value Date    ALBUMIN 4.3 03/03/2021    PROTTOTAL 7.3 03/03/2021    ALT 28 03/03/2021    AST 20 03/03/2021    ALKPHOS 121 03/03/2021    BILITOTAL 0.6 03/03/2021     OTHER:   Lab Results   Component Value Date    LONG 9.4 03/03/2021    PHOS 3.7 05/22/2019    TSH 0.47 08/24/2020       Anesthesia Plan    ASA Status:  2   NPO Status:  NPO Appropriate    Anesthesia Type: General.     - Airway: LMA   Induction: Intravenous, Propofol.   Maintenance: Balanced.        Consents    Anesthesia Plan(s) and associated risks, benefits, and realistic alternatives discussed. Questions answered and patient/representative(s) expressed understanding.     - Discussed with:  Patient      - Extended Intubation/Ventilatory Support Discussed: No.      - Patient is DNR/DNI Status: No    Use of blood products discussed: No .     Postoperative Care    Pain management: IV analgesics.   PONV prophylaxis: Ondansetron  (or other 5HT-3), Dexamethasone or Solumedrol, Scopolamine patch     Comments:                AISSATOU Hanley CRNA

## 2021-11-03 LAB
PATH REPORT.COMMENTS IMP SPEC: NORMAL
PATH REPORT.FINAL DX SPEC: NORMAL
PHOTO IMAGE: NORMAL

## 2021-11-05 ENCOUNTER — APPOINTMENT (OUTPATIENT)
Dept: CT IMAGING | Facility: HOSPITAL | Age: 61
End: 2021-11-05
Attending: STUDENT IN AN ORGANIZED HEALTH CARE EDUCATION/TRAINING PROGRAM
Payer: COMMERCIAL

## 2021-11-05 ENCOUNTER — HOSPITAL ENCOUNTER (EMERGENCY)
Facility: HOSPITAL | Age: 61
Discharge: HOME | End: 2021-11-05
Attending: STUDENT IN AN ORGANIZED HEALTH CARE EDUCATION/TRAINING PROGRAM | Admitting: STUDENT IN AN ORGANIZED HEALTH CARE EDUCATION/TRAINING PROGRAM
Payer: COMMERCIAL

## 2021-11-05 VITALS
HEART RATE: 74 BPM | TEMPERATURE: 98 F | SYSTOLIC BLOOD PRESSURE: 131 MMHG | OXYGEN SATURATION: 98 % | DIASTOLIC BLOOD PRESSURE: 64 MMHG | RESPIRATION RATE: 16 BRPM

## 2021-11-05 DIAGNOSIS — N23 URETERAL COLIC: ICD-10-CM

## 2021-11-05 DIAGNOSIS — G89.18 ACUTE POST-OPERATIVE PAIN: ICD-10-CM

## 2021-11-05 LAB
ALBUMIN UR-MCNC: 100 MG/DL
ANION GAP SERPL CALCULATED.3IONS-SCNC: 5 MMOL/L (ref 3–14)
APPEARANCE UR: ABNORMAL
BACTERIA #/AREA URNS HPF: ABNORMAL /HPF
BILIRUB UR QL STRIP: NEGATIVE
BUN SERPL-MCNC: 13 MG/DL (ref 7–30)
CALCIUM SERPL-MCNC: 8.7 MG/DL (ref 8.5–10.1)
CHLORIDE BLD-SCNC: 111 MMOL/L (ref 94–109)
CO2 SERPL-SCNC: 26 MMOL/L (ref 20–32)
COLOR UR AUTO: ABNORMAL
CREAT SERPL-MCNC: 0.71 MG/DL (ref 0.52–1.04)
ERYTHROCYTE [DISTWIDTH] IN BLOOD BY AUTOMATED COUNT: 13.6 % (ref 10–15)
GFR SERPL CREATININE-BSD FRML MDRD: >90 ML/MIN/1.73M2
GLUCOSE BLD-MCNC: 101 MG/DL (ref 70–99)
GLUCOSE UR STRIP-MCNC: NEGATIVE MG/DL
HCT VFR BLD AUTO: 40.3 % (ref 35–47)
HGB BLD-MCNC: 13.1 G/DL (ref 11.7–15.7)
HGB UR QL STRIP: ABNORMAL
HOLD SPECIMEN: NORMAL
KETONES UR STRIP-MCNC: NEGATIVE MG/DL
LEUKOCYTE ESTERASE UR QL STRIP: ABNORMAL
MCH RBC QN AUTO: 31 PG (ref 26.5–33)
MCHC RBC AUTO-ENTMCNC: 32.5 G/DL (ref 31.5–36.5)
MCV RBC AUTO: 95 FL (ref 78–100)
MUCOUS THREADS #/AREA URNS LPF: PRESENT /LPF
NITRATE UR QL: NEGATIVE
PH UR STRIP: 6 [PH] (ref 4.7–8)
PLATELET # BLD AUTO: 248 10E3/UL (ref 150–450)
POTASSIUM BLD-SCNC: 3.6 MMOL/L (ref 3.4–5.3)
RBC # BLD AUTO: 4.23 10E6/UL (ref 3.8–5.2)
RBC URINE: >182 /HPF
SODIUM SERPL-SCNC: 142 MMOL/L (ref 133–144)
SP GR UR STRIP: 1.02 (ref 1–1.03)
SQUAMOUS EPITHELIAL: 8 /HPF
UROBILINOGEN UR STRIP-MCNC: NORMAL MG/DL
WBC # BLD AUTO: 11.5 10E3/UL (ref 4–11)
WBC URINE: >182 /HPF

## 2021-11-05 PROCEDURE — 85014 HEMATOCRIT: CPT | Performed by: STUDENT IN AN ORGANIZED HEALTH CARE EDUCATION/TRAINING PROGRAM

## 2021-11-05 PROCEDURE — 255N000002 HC RX 255 OP 636: Performed by: RADIOLOGY

## 2021-11-05 PROCEDURE — 80048 BASIC METABOLIC PNL TOTAL CA: CPT | Performed by: STUDENT IN AN ORGANIZED HEALTH CARE EDUCATION/TRAINING PROGRAM

## 2021-11-05 PROCEDURE — 74177 CT ABD & PELVIS W/CONTRAST: CPT

## 2021-11-05 PROCEDURE — 81001 URINALYSIS AUTO W/SCOPE: CPT | Performed by: STUDENT IN AN ORGANIZED HEALTH CARE EDUCATION/TRAINING PROGRAM

## 2021-11-05 PROCEDURE — 99284 EMERGENCY DEPT VISIT MOD MDM: CPT | Performed by: STUDENT IN AN ORGANIZED HEALTH CARE EDUCATION/TRAINING PROGRAM

## 2021-11-05 PROCEDURE — 99285 EMERGENCY DEPT VISIT HI MDM: CPT | Mod: 25

## 2021-11-05 PROCEDURE — 87086 URINE CULTURE/COLONY COUNT: CPT | Performed by: STUDENT IN AN ORGANIZED HEALTH CARE EDUCATION/TRAINING PROGRAM

## 2021-11-05 PROCEDURE — 36415 COLL VENOUS BLD VENIPUNCTURE: CPT | Performed by: STUDENT IN AN ORGANIZED HEALTH CARE EDUCATION/TRAINING PROGRAM

## 2021-11-05 RX ORDER — IOPAMIDOL 612 MG/ML
73 INJECTION, SOLUTION INTRAVASCULAR ONCE
Status: COMPLETED | OUTPATIENT
Start: 2021-11-05 | End: 2021-11-05

## 2021-11-05 RX ORDER — OXYCODONE HYDROCHLORIDE 5 MG/1
5 TABLET ORAL EVERY 6 HOURS PRN
Qty: 8 TABLET | Refills: 0 | Status: SHIPPED | OUTPATIENT
Start: 2021-11-05

## 2021-11-05 RX ADMIN — IOPAMIDOL 73 ML: 612 INJECTION, SOLUTION INTRAVENOUS at 13:35

## 2021-11-05 NOTE — DISCHARGE INSTRUCTIONS
- Please take Tylenol and the prescribed Oxycodone for your symptoms  - Please return to the Emergency Room if you do not improve, feel worse, or have any new or concerning symptoms. We would especially want to see you back if you experience fever or worsening pain or burning with urination  - Please follow up with your urologist in 2-3 days

## 2021-11-05 NOTE — ED TRIAGE NOTES
C/o lower abdominal pain that started yesterday. Had bladder surgery 11/2 with Dr. Walker at [facility] related to kidney CA. Denies dysuria but does have some hematuria, which she was told would be expected postoperatively. Denies n/v but does have some chills.

## 2021-11-05 NOTE — ED NOTES
Pt was given a MRI checklist and pants to change into. Pt seems well to go to MRI via wheelchair.

## 2021-11-05 NOTE — ED PROVIDER NOTES
History     Chief Complaint   Patient presents with     Abdominal Pain     HPI  Kait Augustin is a 61 year old female with recent bladder surgery and possible partial nephrectomy (patient is unsure which side and what exactly was done). She is having hematuria but no dysuria which she was told would be expected. 7/10 left sided lower abdominal pain. No N/V/D. Does have some chills but no fever. No CP/SOB. States she has only one kidney related to cancer. No hx of SBO.    Allergies:  Allergies   Allergen Reactions     No Known Allergies        Problem List:    Patient Active Problem List    Diagnosis Date Noted     Recurrent malignant neoplasm of bladder (H) 02/22/2021     Priority: Medium     Added automatically from request for surgery [id number]       Bladder tumor 07/23/2020     Priority: Medium     Added automatically from request for surgery [id number]       Urothelial carcinoma of kidney, right (H) 06/03/2019     Priority: Medium     History of bladder cancer 06/03/2019     Priority: Medium     ACP (advance care planning) 12/13/2016     Priority: Medium     Advance Care Planning 12/13/2016: ACP Review of Chart / Resources Provided:  Reviewed chart for advance care plan.  Kait Augustin has no plan or code status on file. Discussed available resources and provided with information. Confirmed code status reflects current choices pending further ACP discussions.  Confirmed/documented legally designated decision makers.  Added by Radha Parekh             Malignant neoplasm of overlapping sites of bladder (H) 12/22/2015     Priority: Medium     H/O renal calculi 11/17/2014     Priority: Medium        Past Medical History:    Past Medical History:   Diagnosis Date     Cancer (H)      H/O renal calculi 11/17/2014     MIGRAINE HEADACHES 1996     Nicotine dependence 11/17/2014     PONV (postoperative nausea and vomiting)      Tobacco abuse 11/17/2014       Past Surgical History:    Past Surgical  History:   Procedure Laterality Date     C LIGATE FALLOPIAN TUBE  1996     CYSTOSCOPY, RETROGRADES, INSERT STENT URETER(S), COMBINED Right 12/8/2015    Procedure: COMBINED CYSTOSCOPY, RETROGRADES, INSERT STENT URETER(S);  Surgeon: Efren Couch MD;  Location: [facility]     CYSTOSCOPY, RETROGRADES, INSERT STENT URETER(S), COMBINED Bilateral 8/4/2020    Procedure: bilateral retrograde pyelogram;  Surgeon: Delfino Walker MD;  Location: [facility]     CYSTOSCOPY, TRANSURETHRAL RESECTION (TUR) PROSTATE, COMBINED Bilateral 5/7/2019    Procedure: Transurethral Resection of Bladder Tumor, BILATERAL RETROGRADE PYELOGRAMS, RIGHT URETEROSCOPY WITH HOLMIUM LASER TUMOR ABLATION AND STENT PLACEMENT;  Surgeon: Delfino Walker MD;  Location: [facility]     CYSTOSCOPY, TRANSURETHRAL RESECTION (TUR) TUMOR BLADDER INSTILL CHEMOTHERAPY, COMBINED N/A 8/4/2020    Procedure: Transurethral resection of bladder tumor;  Surgeon: Delfino Walker MD;  Location: [facility]     CYSTOSCOPY, TRANSURETHRAL RESECTION (TUR) TUMOR BLADDER INSTILL CHEMOTHERAPY, COMBINED Bilateral 3/9/2021    Procedure: Transurethral resection of bladder tumor, bilateral retrograde pyelograms and gemcitabine instillation;  Surgeon: Delfino Walker MD;  Location: [facility]     CYSTOSCOPY, TRANSURETHRAL RESECTION (TUR) TUMOR BLADDER, COMBINED N/A 2/10/2015    Procedure: COMBINED CYSTOSCOPY, TRANSURETHRAL RESECTION (TUR) TUMOR BLADDER;  Surgeon: Efren Couch MD;  Location: [facility]     CYSTOSCOPY, TRANSURETHRAL RESECTION (TUR) TUMOR BLADDER, COMBINED N/A 12/8/2015    Procedure: COMBINED CYSTOSCOPY, TRANSURETHRAL RESECTION (TUR) TUMOR BLADDER;  Surgeon: Efren Couch MD;  Location: [facility]     CYSTOSCOPY, TRANSURETHRAL RESECTION (TUR) TUMOR BLADDER, COMBINED Bilateral 11/2/2021    Procedure: Transurethral resection of bladder tumor and bilateral retrograde pyelograms with left stent placement;  Surgeon: Delfino Walker MD;  Location: [facility]     CYSTOSCOPY, URETEROSCOPY,  COMBINED Right 5/28/2019    Procedure: Right Ureteroscopy, Fulgaration if Right iUpper Tract Urothelial Carcinoma;  Surgeon: Delfino Walker MD;  Location:  OR     CYSTOSCOPY, URETEROSCOPY, COMBINED Right 8/4/2020    Procedure: diagnostic ureteroscopy,ablation of urothelial tumor, possible fulguration through flexible ureteroscope;  Surgeon: Delfino Walker MD;  Location:  OR       Family History:    Family History   Problem Relation Age of Onset     C.A.D. Father         dx late 60's, CABG, angioplasty     Cancer Father         Lung, dx age 80 h/o tobacco use     Heart Disease Mother      Thyroid Disease Mother      Lipids Sister        Social History:  Marital Status:   [2]  Social History     Tobacco Use     Smoking status: Current Every Day Smoker     Packs/day: 0.50     Years: 45.00     Pack years: 22.50     Types: Cigarettes     Smokeless tobacco: Never Used   Substance Use Topics     Alcohol use: Yes     Comment: rare     Drug use: No        Medications:    metroNIDAZOLE (METROGEL) 1 % external gel  oxyCODONE (ROXICODONE) 5 MG tablet  UNABLE TO FIND          Review of Systems   All other systems reviewed and are negative.      Physical Exam   BP: 152/67  Pulse: 84  Temp: 98  F (36.7  C)  Resp: 18  SpO2: 98 %      Physical Exam  Vitals and nursing note reviewed.   Constitutional:       General: She is not in acute distress.     Appearance: She is well-developed. She is not ill-appearing or diaphoretic.   HENT:      Head: Normocephalic and atraumatic.      Mouth/Throat:      Mouth: Mucous membranes are moist.      Pharynx: Oropharynx is clear.   Eyes:      Extraocular Movements: Extraocular movements intact.   Cardiovascular:      Rate and Rhythm: Normal rate and regular rhythm.      Heart sounds: Normal heart sounds.   Pulmonary:      Effort: Pulmonary effort is normal.      Breath sounds: Normal breath sounds.   Abdominal:      General: Abdomen is flat.      Palpations: Abdomen is soft.       Tenderness: There is abdominal tenderness in the left lower quadrant. There is no guarding or rebound.      Hernia: No hernia is present.   Skin:     General: Skin is warm.      Capillary Refill: Capillary refill takes less than 2 seconds.   Neurological:      General: No focal deficit present.      Mental Status: She is alert and oriented to person, place, and time.   Psychiatric:         Mood and Affect: Mood normal.         ED Course     ED Course as of Nov 05 1508   Fri Nov 05, 2021   1208 Mild leukocytosis consistent with recent surgery.   WBC(!): 11.5   1222 UA looks consistent with price blood, likely related to recent surgery and stent. No infectious symptoms otherwise.   UA with Microscopic reflex to Culture(!)   1452 Unable to contact patients urologist. Otherwise unremarkable work-up. She does not appear infectious. Discussed return precautions if worsening or if developing a fever.   CT Abdomen Pelvis w Contrast   1506 Findings were discussed with the patient including non-emergent imaging/lab results. Additional verbal instructions were discussed with the patient as well. Instructed to follow up with Urology in 2-3 days. Also discussed specific warning signs and instructed to return to the ED if there are any concerns. Patient voiced understanding of instructions, questions were answered and the patient was discharged home in stable condition.        Procedures              Results for orders placed or performed during the hospital encounter of 11/05/21 (from the past 24 hour(s))   CBC with platelets   Result Value Ref Range    WBC Count 11.5 (H) 4.0 - 11.0 10e3/uL    RBC Count 4.23 3.80 - 5.20 10e6/uL    Hemoglobin 13.1 11.7 - 15.7 g/dL    Hematocrit 40.3 35.0 - 47.0 %    MCV 95 78 - 100 fL    MCH 31.0 26.5 - 33.0 pg    MCHC 32.5 31.5 - 36.5 g/dL    RDW 13.6 10.0 - 15.0 %    Platelet Count 248 150 - 450 10e3/uL   Basic metabolic panel   Result Value Ref Range    Sodium 142 133 - 144 mmol/L    Potassium  3.6 3.4 - 5.3 mmol/L    Chloride 111 (H) 94 - 109 mmol/L    Carbon Dioxide (CO2) 26 20 - 32 mmol/L    Anion Gap 5 3 - 14 mmol/L    Urea Nitrogen 13 7 - 30 mg/dL    Creatinine 0.71 0.52 - 1.04 mg/dL    Calcium 8.7 8.5 - 10.1 mg/dL    Glucose 101 (H) 70 - 99 mg/dL    GFR Estimate >90 >60 mL/min/1.73m2   [facility]    Narrative    The following orders were created for panel order [facility].  Procedure                               Abnormality         Status                     ---------                               -----------         ------                     Extra Blue Top Tube[[id number]]                              Final result               Extra Red Top Tube[[id number]]                               Final result               Extra Green Top (Lithium...[[id number]]                      Final result                 Please view results for these tests on the individual orders.   Extra Blue Top Tube   Result Value Ref Range    Hold Specimen     Extra Red Top Tube   Result Value Ref Range    Hold Specimen     Extra Green Top (Lithium Heparin) Tube   Result Value Ref Range    Hold Specimen     UA with Microscopic reflex to Culture    Specimen: Urine, Clean Catch   Result Value Ref Range    Color Urine Dark Red (A) Colorless, Straw, Light Yellow, Yellow    Appearance Urine Cloudy (A) Clear    Glucose Urine Negative Negative mg/dL    Bilirubin Urine Negative Negative    Ketones Urine Negative Negative mg/dL    Specific Gravity Urine 1.023 1.003 - 1.035    Blood Urine Large (A) Negative    pH Urine 6.0 4.7 - 8.0    Protein Albumin Urine 100  (A) Negative mg/dL    Urobilinogen Urine Normal Normal, 2.0 mg/dL    Nitrite Urine Negative Negative    Leukocyte Esterase Urine Moderate (A) Negative    Bacteria Urine Few (A) None Seen /HPF    Mucus Urine Present (A) None Seen /LPF    RBC Urine >182 (H) <=2 /HPF    WBC Urine >182 (H) <=5 /HPF    Squamous Epithelials Urine 8 (H) <=1 /HPF    Narrative    Urine Culture ordered  based on laboratory criteria   CT Abdomen Pelvis w Contrast    Narrative    Exam: CT ABDOMEN PELVIS W CONTRAST    Exam reason: Abdominal abscess/infection suspected; Abdominal pain,  acute, nonlocalized    Technique: Using helical CT technique, axial images of the abdomen and  pelvis were obtained with IV contrast.  Coronal and sagittal  reconstructions also performed.    Meds/Contrast: Isovue 300 73 ml    Comparison: CT of the abdomen and pelvis on 8/18/2020, 8/7/2020     FINDINGS:    ABDOMEN:    Liver: There are several stable hypodensities compatible with benign  cysts.  Gallbladder: No calcified gallstones.   Bile Ducts: No biliary ductal dilation.   Spleen:  No splenomegaly or focal lesion.  Pancreas: No mass, ductal dilatation, or inflammatory changes.  Kidneys: No solid mass. Minimal left hydronephrosis. A left ureteral  stent is in the expected location. There are two 3 mm nonobstructing  calculi in the lower pole of the right kidney. There are a couple of  nonobstructing punctate calculi in the left kidney.   Adrenals:  Stable 2.6 cm nodule in the left adrenal gland.   Lymph Nodes: No adenopathy.   Vascular: No aortic aneurysm.   Abdominal Wall: No acute findings.     PELVIS:   No mass or adenopathy.     Bowel/Mesentery/Peritoneum:   -No bowel obstruction.   -No acute inflammatory findings.  -No ascites.    Visualized portions of the Chest: No pleural effusion or significant  findings.  Musculoskeletal: No acute osseous abnormalities.       Impression    IMPRESSION:  Minimal left hydronephrosis with a left ureteral stent in the expected  location. There are a couple nonobstructing calculi bilaterally.    No other acute findings in the abdomen or pelvis.    SAADIA BELL MD         SYSTEM ID:  [id number]       Medications   iopamidol (ISOVUE-300) IV solution 61% 73 mL (73 mLs Intravenous Given 11/5/21 1335)   sodium chloride (PF) 0.9% PF flush 60 mL (60 mLs Intracatheter Given 11/5/21 1335)       Assessments  & Plan (with Medical Decision Making)     I have reviewed the nursing notes.    LLQ abdominal pain. Had surgery 3 days ago. Plan for CT given risk of complications. Labs/UA obtained. See ED Course. No vaginal bleeding/symptoms. After CT, will try to discuss with patients surgeon but he may not be available. Pain is tolerable at this time and does not want something else.    I have reviewed the findings, diagnosis, plan and need for follow up with the patient.    New Prescriptions    OXYCODONE (ROXICODONE) 5 MG TABLET    Take 1 tablet (5 mg) by mouth every 6 hours as needed for pain       Final diagnoses:   Ureteral colic   Acute post-operative pain       11/5/2021   [facility]     Ajit Mishra MD  11/05/21 [id number]

## 2021-11-07 LAB — BACTERIA UR CULT: NORMAL

## 2021-11-08 ENCOUNTER — TELEPHONE (OUTPATIENT)
Dept: UROLOGY | Facility: OTHER | Age: 61
End: 2021-11-08
Payer: COMMERCIAL

## 2021-11-08 DIAGNOSIS — Z85.51 HISTORY OF BLADDER CANCER: Primary | ICD-10-CM

## 2021-11-08 NOTE — TELEPHONE ENCOUNTER
Pt went into ED 11/5 was diagnosed with kidney stones, was only given 8 pills.  Was hoping to get some more

## 2021-11-08 NOTE — TELEPHONE ENCOUNTER
Patient  had surgeyr with Dr. Walker on 11/02/2021. She states she was in the ER on Friday for kidney stones. She is having quite a bit of pain and would like to talk to the nurse.    Vee Mejía on 11/8/2021 at 8:42 AM

## 2021-11-09 RX ORDER — HYDROCODONE BITARTRATE AND ACETAMINOPHEN 5; 325 MG/1; MG/1
1-2 TABLET ORAL EVERY 4 HOURS PRN
Qty: 20 TABLET | Refills: 0 | Status: SHIPPED | OUTPATIENT
Start: 2021-11-09

## 2021-11-15 ENCOUNTER — HOSPITAL ENCOUNTER (OUTPATIENT)
Dept: MRI IMAGING | Facility: HOSPITAL | Age: 61
Discharge: HOME | End: 2021-11-15
Attending: NURSE PRACTITIONER | Admitting: NURSE PRACTITIONER
Payer: COMMERCIAL

## 2021-11-15 ENCOUNTER — OFFICE VISIT (OUTPATIENT)
Dept: UROLOGY | Facility: OTHER | Age: 61
End: 2021-11-15
Attending: UROLOGY
Payer: COMMERCIAL

## 2021-11-15 VITALS
HEART RATE: 90 BPM | TEMPERATURE: 97.4 F | RESPIRATION RATE: 16 BRPM | WEIGHT: 124.6 LBS | BODY MASS INDEX: 22.79 KG/M2 | OXYGEN SATURATION: 97 %

## 2021-11-15 DIAGNOSIS — Z85.51 HISTORY OF BLADDER CANCER: Primary | ICD-10-CM

## 2021-11-15 DIAGNOSIS — R29.898 WEAKNESS OF LEFT ARM: ICD-10-CM

## 2021-11-15 DIAGNOSIS — M25.60 DECREASED RANGE OF MOTION: ICD-10-CM

## 2021-11-15 DIAGNOSIS — M25.512 ACUTE PAIN OF LEFT SHOULDER: ICD-10-CM

## 2021-11-15 PROCEDURE — 99212 OFFICE O/P EST SF 10 MIN: CPT | Mod: 25 | Performed by: UROLOGY

## 2021-11-15 PROCEDURE — 250N000013 HC RX MED GY IP 250 OP 250 PS 637: Performed by: UROLOGY

## 2021-11-15 PROCEDURE — 52310 CYSTOSCOPY AND TREATMENT: CPT | Performed by: UROLOGY

## 2021-11-15 PROCEDURE — 73221 MRI JOINT UPR EXTREM W/O DYE: CPT | Mod: LT

## 2021-11-15 RX ORDER — CEFUROXIME AXETIL 250 MG/1
500 TABLET ORAL EVERY 12 HOURS SCHEDULED
Status: COMPLETED | OUTPATIENT
Start: 2021-11-15 | End: 2021-11-15

## 2021-11-15 RX ADMIN — CEFUROXIME AXETIL 500 MG: 250 TABLET, FILM COATED ORAL at 08:40

## 2021-11-15 ASSESSMENT — PAIN SCALES - GENERAL: PAINLEVEL: MILD PAIN (3)

## 2021-11-15 NOTE — PATIENT INSTRUCTIONS

## 2021-11-15 NOTE — PROGRESS NOTES
Preoperative diagnosis  Right upper tract urothelial carcinoma  History of bladder cancer    Postoperative diagnosis  Right upper tract urothelial carcinoma  History of bladder cancer    Procedure  Flexible Cystourethroscopy and stent removal    Surgeon  Delfino Walker MD    Anesthesia  2% lidocaine jelly intraurethrally    Complications  None    Indications  The patient is undergoing a flexible cystoscopy for the above mentioned indications.    Findings  Cystoscopic findings included a normal urethra.    The bladder appeared to be normal capacity.  There were no stones or foreign bodies.    The orifices were slit-shaped and in their normal location.    Procedure  The patient was placed in supine position and prepped and draped in sterile fashion with lidocaine jelly per urethra for anesthesia.    I passed a lubricated 14F flexible cystoscope through the urethra and into the bladder and the bladder was completely visualized.  Stent grasper was positioned in the lumen of the bladder.  The stent was grasped with a grasper and removed intact.  The patient tolerated the procedure well.      Pathology  I personally reviewed the pathology report  11/2/2021  TURBT  HGTa    3/9/2021  TURBT and ureteral mass treated    8/4/2020  TURBT and ureteral mass treated  High grade Ta    5/7/2019  High grade Ta    5/7/2019  Right upper tract with low grade Ta urothelial tumor which was ablated (no biopsy sent)    ^^^^^^^^^^^^^^^^^^^^^^^^^    Intravesical therapy Schedule  Induction BCG X 6 weeks 6/2019    Assessment  61-year-old female with recurrent intermediate and high risk bladder cancer.  Reviewed the patient's pathology.  Discussed her ongoing surveillance schedule.  Her next cystoscopy will be in 3 months.  We discussed attempting to extend her interval up to 6 months however we likely will not extend much beyond that for some time given her frequency of recurrence.    She had previously received induction BCG.  I recommended  maintenance BCG.  She was initially set up for maintenance BCG but she discontinued/canceled as she is not interested in future treatments.    All patient's questions were answered and the patient was consented for the procedure listed below.     Plan  Follow-up for surveillance cystoscopy every 3 months.  I explained that we will try to get to every 6-month interval for surround cystoscopies but it would likely be quite some time before we could extend to once a year given her frequency of recurrence                A total of 20 minutes (exclusive of separately billed services/procedures) was spent with the patient, reviewing records, tests, ordering medications, tests or procedures, counseling regarding the above described medical concern, recommendations regarding management and documenting clinical information in the EHR.

## 2021-11-15 NOTE — NURSING NOTE
Patient positioned in frog leg position prior to Delfino Walker MD prepping patient with chlorhexidine gluconate cleanser.    [place] Protocol    A. Pre-procedure verification complete Yes   1-relevant information / documentation available, reviewed and properly matched to the patient; 2-consent accurate and complete, 3-equipment and supplies available    B. Site marking complete N/A  Site marked if not in continuous attendance with patient    C. TIME OUT completed Yes  Time Out was conducted just prior to starting procedure to verify the eight required elements: 1-patient identity, 2-consent accurate and complete, 3-position, 4-correct side/site marked (if applicable), 5-procedure, 6-relevant images / results properly labeled and displayed (if applicable), 7-antibiotics / irrigation fluids (if applicable), 8-safety precautions.    After procedure perineum area rinsed. Discharge instructions reviewed with patient. Patient verbalized understanding of discharge instructions and discharged ambulatory.  Amber Eason LPN..................11/15/2021  8:03 AM

## 2021-11-17 ENCOUNTER — TRANSFERRED RECORDS (OUTPATIENT)
Dept: HEALTH INFORMATION MANAGEMENT | Facility: CLINIC | Age: 61
End: 2021-11-17
Payer: COMMERCIAL

## 2022-02-16 ENCOUNTER — OFFICE VISIT (OUTPATIENT)
Dept: UROLOGY | Facility: OTHER | Age: 62
End: 2022-02-16
Attending: UROLOGY
Payer: COMMERCIAL

## 2022-02-16 VITALS
BODY MASS INDEX: 23.7 KG/M2 | OXYGEN SATURATION: 97 % | RESPIRATION RATE: 16 BRPM | WEIGHT: 129.6 LBS | HEART RATE: 91 BPM

## 2022-02-16 DIAGNOSIS — Z85.51 HISTORY OF BLADDER CANCER: Primary | ICD-10-CM

## 2022-02-16 PROCEDURE — 52000 CYSTOURETHROSCOPY: CPT | Performed by: UROLOGY

## 2022-02-16 ASSESSMENT — PAIN SCALES - GENERAL: PAINLEVEL: NO PAIN (0)

## 2022-02-16 NOTE — PATIENT INSTRUCTIONS

## 2022-02-16 NOTE — PROGRESS NOTES
Preoperative diagnosis  Right upper tract urothelial carcinoma  History of bladder cancer    Postoperative diagnosis  Right upper tract urothelial carcinoma  History of bladder cancer    Procedure  Flexible Cystourethroscopy and stent removal    Surgeon  Delfino Walker MD    Anesthesia  2% lidocaine jelly intraurethrally    Complications  None    Indications  The patient is undergoing a flexible cystoscopy for the above mentioned indications.    Findings  Cystoscopic findings included a normal urethra.    The bladder appeared to be normal capacity.  There were no stones or foreign bodies.    The orifices were slit-shaped and in their normal location.    Procedure  The patient was placed in supine position and prepped and draped in sterile fashion with lidocaine jelly per urethra for anesthesia.    I passed a lubricated 14F flexible cystoscope through the urethra and into the bladder and the bladder was completely visualized.  Stent grasper was positioned in the lumen of the bladder.  The stent was grasped with a grasper and removed intact.  The patient tolerated the procedure well.      Pathology  I personally reviewed the pathology report  11/2/2021  TURBT  HGTa    3/9/2021  TURBT and ureteral mass treated    8/4/2020  TURBT and ureteral mass treated  High grade Ta    5/7/2019  High grade Ta    5/7/2019  Right upper tract with low grade Ta urothelial tumor which was ablated (no biopsy sent)    ^^^^^^^^^^^^^^^^^^^^^^^^^    Intravesical therapy Schedule  Induction BCG X 6 weeks 6/2019    Assessment  61-year-old female with recurrent intermediate and high risk bladder cancer.    She had previously received induction BCG.  I recommended maintenance BCG.  She was initially set up for maintenance BCG but she discontinued/canceled as she is not interested in future treatments.    Plan  Follow-up for surveillance cystoscopy every 3 months.  I explained that we will try to get to every 6-month interval for surround  cystoscopies but it would likely be quite some time before we could extend to once a year given her frequency of recurrence

## 2022-02-16 NOTE — NURSING NOTE
Patient positioned in frog leg position prior to Delfino Walker MD prepping patient with chlorhexidine gluconate cleanser.    [place] Protocol    A. Pre-procedure verification complete Yes   1-relevant information / documentation available, reviewed and properly matched to the patient; 2-consent accurate and complete, 3-equipment and supplies available    B. Site marking complete N/A  Site marked if not in continuous attendance with patient    C. TIME OUT completed Yes  Time Out was conducted just prior to starting procedure to verify the eight required elements: 1-patient identity, 2-consent accurate and complete, 3-position, 4-correct side/site marked (if applicable), 5-procedure, 6-relevant images / results properly labeled and displayed (if applicable), 7-antibiotics / irrigation fluids (if applicable), 8-safety precautions.    After procedure perineum area rinsed. Discharge instructions reviewed with patient. Patient verbalized understanding of discharge instructions and discharged ambulatory.  Amber Eason LPN..................2/16/2022  8:58 AM

## 2022-05-16 ENCOUNTER — OFFICE VISIT (OUTPATIENT)
Dept: UROLOGY | Facility: OTHER | Age: 62
End: 2022-05-16
Attending: UROLOGY
Payer: COMMERCIAL

## 2022-05-16 VITALS
OXYGEN SATURATION: 96 % | RESPIRATION RATE: 16 BRPM | WEIGHT: 130 LBS | HEART RATE: 77 BPM | BODY MASS INDEX: 23.78 KG/M2

## 2022-05-16 DIAGNOSIS — Z85.51 HISTORY OF BLADDER CANCER: Primary | ICD-10-CM

## 2022-05-16 PROCEDURE — 52000 CYSTOURETHROSCOPY: CPT | Performed by: UROLOGY

## 2022-05-16 ASSESSMENT — PAIN SCALES - GENERAL: PAINLEVEL: NO PAIN (0)

## 2022-05-16 NOTE — NURSING NOTE
Patient positioned in frog leg position prior to Delfino Walker MD prepping patient with chlorhexidine gluconate cleanser.    [place] Protocol    A. Pre-procedure verification complete Yes   1-relevant information / documentation available, reviewed and properly matched to the patient; 2-consent accurate and complete, 3-equipment and supplies available    B. Site marking complete N/A  Site marked if not in continuous attendance with patient    C. TIME OUT completed Yes  Time Out was conducted just prior to starting procedure to verify the eight required elements: 1-patient identity, 2-consent accurate and complete, 3-position, 4-correct side/site marked (if applicable), 5-procedure, 6-relevant images / results properly labeled and displayed (if applicable), 7-antibiotics / irrigation fluids (if applicable), 8-safety precautions.    After procedure perineum area rinsed. Discharge instructions reviewed with patient. Patient verbalized understanding of discharge instructions and discharged ambulatory.  Amber Eason LPN..................5/16/2022  8:45 AM

## 2022-05-16 NOTE — PATIENT INSTRUCTIONS

## 2022-05-16 NOTE — PROGRESS NOTES
Preoperative diagnosis  History of right upper tract urothelial carcinoma  History of bladder cancer    Postoperative diagnosis  History of right upper tract urothelial carcinoma  History of bladder cancer    Procedure  Flexible Cystourethroscopy    Surgeon  Delfino Walker MD    Anesthesia  2% lidocaine jelly intraurethrally    Complications  None    Indications  The patient is undergoing a flexible cystoscopy for the above mentioned indications.    Findings  Cystoscopic findings included a normal urethra.    The bladder appeared to be normal capacity.  There were no stones or foreign bodies.    The orifices were slit-shaped and in their normal location.    Procedure  The patient was placed in supine position and prepped and draped in sterile fashion with lidocaine jelly per urethra for anesthesia.    I passed a lubricated 14F flexible cystoscope through the urethra and into the bladder and the bladder was completely visualized.  Stent grasper was positioned in the lumen of the bladder.  The stent was grasped with a grasper and removed intact.  The patient tolerated the procedure well.      Pathology  I personally reviewed the pathology report  11/2/2021  TURBT  HGTa    3/9/2021  TURBT and ureteral mass treated    8/4/2020  TURBT and ureteral mass treated  High grade Ta    5/7/2019  High grade Ta    5/7/2019  Right upper tract with low grade Ta urothelial tumor which was ablated (no biopsy sent)    ^^^^^^^^^^^^^^^^^^^^^^^^^    Intravesical therapy Schedule  Induction BCG X 6 weeks 6/2019    Assessment  61-year-old female with recurrent intermediate and high risk bladder cancer.    She had previously received induction BCG.  I recommended maintenance BCG.  She was initially set up for maintenance BCG but she discontinued/canceled as she is not interested in future treatments.    Plan  Follow-up for surveillance cystoscopy every 3 months.              A total of 10 minutes (exclusive of separately billed  services/procedures) was spent with the patient, reviewing records, tests, ordering medications, tests or procedures, counseling regarding the above described medical concern, recommendations regarding management and documenting clinical information in the EHR.

## 2022-08-17 ENCOUNTER — OFFICE VISIT (OUTPATIENT)
Dept: UROLOGY | Facility: OTHER | Age: 62
End: 2022-08-17
Attending: UROLOGY
Payer: COMMERCIAL

## 2022-08-17 VITALS
OXYGEN SATURATION: 97 % | HEART RATE: 80 BPM | WEIGHT: 127.2 LBS | TEMPERATURE: 97.6 F | BODY MASS INDEX: 23.27 KG/M2 | RESPIRATION RATE: 16 BRPM

## 2022-08-17 DIAGNOSIS — Z85.51 HISTORY OF BLADDER CANCER: Primary | ICD-10-CM

## 2022-08-17 PROCEDURE — 52000 CYSTOURETHROSCOPY: CPT | Performed by: UROLOGY

## 2022-08-17 ASSESSMENT — PAIN SCALES - GENERAL: PAINLEVEL: NO PAIN (0)

## 2022-08-17 NOTE — PATIENT INSTRUCTIONS

## 2022-08-17 NOTE — NURSING NOTE
Patient positioned in frog leg position prior to Delfino Walker MD prepping patient with chlorhexidine gluconate cleanser.    [place] Protocol    A. Pre-procedure verification complete Yes   1-relevant information / documentation available, reviewed and properly matched to the patient; 2-consent accurate and complete, 3-equipment and supplies available    B. Site marking complete N/A  Site marked if not in continuous attendance with patient    C. TIME OUT completed Yes  Time Out was conducted just prior to starting procedure to verify the eight required elements: 1-patient identity, 2-consent accurate and complete, 3-position, 4-correct side/site marked (if applicable), 5-procedure, 6-relevant images / results properly labeled and displayed (if applicable), 7-antibiotics / irrigation fluids (if applicable), 8-safety precautions.    After procedure perineum area rinsed. Discharge instructions reviewed with patient. Patient verbalized understanding of discharge instructions and discharged ambulatory.  Amber Eason LPN..................8/17/2022  9:02 AM

## 2022-08-17 NOTE — PROGRESS NOTES
Preoperative diagnosis  History of right upper tract urothelial carcinoma  History of bladder cancer    Postoperative diagnosis  History of right upper tract urothelial carcinoma  History of bladder cancer    Procedure  Flexible Cystourethroscopy    Surgeon  Delfino Walker MD    Anesthesia  2% lidocaine jelly intraurethrally    Complications  None    Indications  The patient is undergoing a flexible cystoscopy for the above mentioned indications.    Findings  Cystoscopic findings included a normal urethra.    The bladder appeared to be normal capacity.  There were no stones or foreign bodies.    The orifices were slit-shaped and in their normal location.    Procedure  The patient was placed in supine position and prepped and draped in sterile fashion with lidocaine jelly per urethra for anesthesia.    I passed a lubricated 14F flexible cystoscope through the urethra and into the bladder and the bladder was completely visualized.  The scope was then removed.  The patient tolerated the procedure well.      Pathology  I personally reviewed the pathology report  11/2/2021  TURBT  HGTa    3/9/2021  TURBT and ureteral mass treated    8/4/2020  TURBT and ureteral mass treated  High grade Ta    5/7/2019  High grade Ta    5/7/2019  Right upper tract with low grade Ta urothelial tumor which was ablated (no biopsy sent)    ^^^^^^^^^^^^^^^^^^^^^^^^^    Intravesical therapy Schedule  Induction BCG X 6 weeks 6/2019    Assessment  62-year-old female with recurrent intermediate and high risk bladder cancer.  Cystoscopy today was clear.    Plan  Follow-up for surveillance cystoscopy every 3 months.

## 2022-11-21 ENCOUNTER — OFFICE VISIT (OUTPATIENT)
Dept: UROLOGY | Facility: OTHER | Age: 62
End: 2022-11-21
Attending: UROLOGY
Payer: COMMERCIAL

## 2022-11-21 VITALS
OXYGEN SATURATION: 97 % | HEART RATE: 85 BPM | WEIGHT: 127.4 LBS | RESPIRATION RATE: 16 BRPM | BODY MASS INDEX: 23.3 KG/M2

## 2022-11-21 DIAGNOSIS — Z85.51 HISTORY OF BLADDER CANCER: Primary | ICD-10-CM

## 2022-11-21 PROCEDURE — 52000 CYSTOURETHROSCOPY: CPT | Performed by: UROLOGY

## 2022-11-21 NOTE — PROGRESS NOTES
Preoperative diagnosis  History of right upper tract urothelial carcinoma  History of bladder cancer    Postoperative diagnosis  History of right upper tract urothelial carcinoma  History of bladder cancer    Procedure  Flexible Cystourethroscopy    Surgeon  Delfino Walker MD    Anesthesia  2% lidocaine jelly intraurethrally    Complications  None    Indications  The patient is undergoing a flexible cystoscopy for the above mentioned indications.    Findings  Cystoscopic findings included a normal urethra.    The bladder appeared to be normal capacity.  There were no stones or foreign bodies.    The orifices were slit-shaped and in their normal location.    Procedure  The patient was placed in supine position and prepped and draped in sterile fashion with lidocaine jelly per urethra for anesthesia.    I passed a lubricated 14F flexible cystoscope through the urethra and into the bladder and the bladder was completely visualized.  The scope was then removed.  The patient tolerated the procedure well.      Pathology  I personally reviewed the pathology report  11/2/2021  TURBT  HGTa    3/9/2021  TURBT and ureteral mass treated    8/4/2020  TURBT and ureteral mass treated  High grade Ta    5/7/2019  High grade Ta    5/7/2019  Right upper tract with low grade Ta urothelial tumor which was ablated (no biopsy sent)    ^^^^^^^^^^^^^^^^^^^^^^^^^    Intravesical therapy Schedule  Induction BCG X 6 weeks 6/2019    Assessment  Recurrent intermediate and high risk bladder cancer.  Cystoscopy today was clear.    Plan  Follow-up for surveillance cystoscopy every 4 months until 12/2023, if clear we will transition to every 6 months in 2024

## 2022-11-21 NOTE — PATIENT INSTRUCTIONS

## 2022-11-21 NOTE — NURSING NOTE
Patient positioned in frog leg position prior to Delfino Walker MD prepping patient with chlorhexidine gluconate cleanser.    [place] Protocol    A. Pre-procedure verification complete Yes   1-relevant information / documentation available, reviewed and properly matched to the patient; 2-consent accurate and complete, 3-equipment and supplies available    B. Site marking complete No  Site marked if not in continuous attendance with patient    C. TIME OUT completed Yes  Time Out was conducted just prior to starting procedure to verify the eight required elements: 1-patient identity, 2-consent accurate and complete, 3-position, 4-correct side/site marked (if applicable), 5-procedure, 6-relevant images / results properly labeled and displayed (if applicable), 7-antibiotics / irrigation fluids (if applicable), 8-safety precautions.    After procedure perineum area rinsed. Discharge instructions reviewed with patient. Patient verbalized understanding of discharge instructions and discharged ambulatory.  Amber Eason LPN..................11/21/2022  1:00 PM

## 2023-03-13 ENCOUNTER — TELEPHONE (OUTPATIENT)
Dept: FAMILY MEDICINE | Facility: OTHER | Age: 63
End: 2023-03-13

## 2023-03-13 DIAGNOSIS — D49.4 BLADDER TUMOR: Primary | ICD-10-CM

## 2023-03-13 DIAGNOSIS — Z85.51 HISTORY OF BLADDER CANCER: ICD-10-CM

## 2023-03-13 DIAGNOSIS — C67.8 MALIGNANT NEOPLASM OF OVERLAPPING SITES OF BLADDER (H): ICD-10-CM

## 2023-03-13 NOTE — TELEPHONE ENCOUNTER
Patient is calling stating she needs a new Referral to Urology for Bladder Cancer. This would be a follow up 4 mnth check up.Please call patient and let her know where she should be going.  [phone]

## 2023-08-30 ENCOUNTER — TELEPHONE (OUTPATIENT)
Dept: FAMILY MEDICINE | Facility: OTHER | Age: 63
End: 2023-08-30

## 2023-08-30 DIAGNOSIS — C67.8 MALIGNANT NEOPLASM OF OVERLAPPING SITES OF BLADDER (H): Primary | ICD-10-CM

## 2023-08-30 DIAGNOSIS — C67.9 RECURRENT MALIGNANT NEOPLASM OF BLADDER (H): ICD-10-CM

## 2023-08-30 DIAGNOSIS — D49.4 BLADDER TUMOR: ICD-10-CM

## 2023-08-30 DIAGNOSIS — C64.1 UROTHELIAL CARCINOMA OF KIDNEY, RIGHT (H): ICD-10-CM

## 2023-08-30 NOTE — TELEPHONE ENCOUNTER
9:08 AM    Reason for Call: Phone Call    Description: Patient called wanting to know if she can get a urology referral to another place besides [facility]. Please call patient back    Was an appointment offered for this call? No  If yes : Appointment type              Date    Preferred method for responding to this message: Telephone Call  What is your phone number ?[phone]    If we cannot reach you directly, may we leave a detailed response at the number you provided? Yes    Can this message wait until your PCP/provider returns, if available today? YES, provider is out    Sonal Murillo

## 2024-11-15 ENCOUNTER — OFFICE VISIT (OUTPATIENT)
Dept: FAMILY MEDICINE | Facility: OTHER | Age: 64
End: 2024-11-15
Attending: NURSE PRACTITIONER
Payer: COMMERCIAL

## 2024-11-15 VITALS
WEIGHT: 124.8 LBS | BODY MASS INDEX: 22.97 KG/M2 | HEIGHT: 62 IN | RESPIRATION RATE: 16 BRPM | HEART RATE: 83 BPM | OXYGEN SATURATION: 98 % | SYSTOLIC BLOOD PRESSURE: 138 MMHG | DIASTOLIC BLOOD PRESSURE: 76 MMHG | TEMPERATURE: 97.3 F

## 2024-11-15 DIAGNOSIS — R35.0 URINARY FREQUENCY: ICD-10-CM

## 2024-11-15 DIAGNOSIS — N30.01 ACUTE CYSTITIS WITH HEMATURIA: Primary | ICD-10-CM

## 2024-11-15 DIAGNOSIS — R31.0 GROSS HEMATURIA: ICD-10-CM

## 2024-11-15 DIAGNOSIS — C67.8 MALIGNANT NEOPLASM OF OVERLAPPING SITES OF BLADDER (H): ICD-10-CM

## 2024-11-15 LAB
ALBUMIN UR-MCNC: 100 MG/DL
APPEARANCE UR: ABNORMAL
BACTERIA #/AREA URNS HPF: ABNORMAL /HPF
BILIRUB UR QL STRIP: NEGATIVE
COLOR UR AUTO: ABNORMAL
GLUCOSE UR STRIP-MCNC: NEGATIVE MG/DL
HGB UR QL STRIP: ABNORMAL
HOLD SPECIMEN: NORMAL
KETONES UR STRIP-MCNC: NEGATIVE MG/DL
LEUKOCYTE ESTERASE UR QL STRIP: ABNORMAL
NITRATE UR QL: NEGATIVE
PH UR STRIP: 6 [PH] (ref 5–7)
RBC #/AREA URNS AUTO: >100 /HPF
SP GR UR STRIP: >=1.03 (ref 1–1.03)
SQUAMOUS #/AREA URNS AUTO: ABNORMAL /LPF
UROBILINOGEN UR STRIP-ACNC: 0.2 E.U./DL
WBC #/AREA URNS AUTO: ABNORMAL /HPF
YEAST #/AREA URNS HPF: ABNORMAL /HPF

## 2024-11-15 RX ORDER — CIPROFLOXACIN 500 MG/1
500 TABLET, FILM COATED ORAL 2 TIMES DAILY
Qty: 14 TABLET | Refills: 0 | Status: SHIPPED | OUTPATIENT
Start: 2024-11-15 | End: 2024-11-22

## 2024-11-15 ASSESSMENT — ANXIETY QUESTIONNAIRES
GAD7 TOTAL SCORE: 0
6. BECOMING EASILY ANNOYED OR IRRITABLE: NOT AT ALL
IF YOU CHECKED OFF ANY PROBLEMS ON THIS QUESTIONNAIRE, HOW DIFFICULT HAVE THESE PROBLEMS MADE IT FOR YOU TO DO YOUR WORK, TAKE CARE OF THINGS AT HOME, OR GET ALONG WITH OTHER PEOPLE: NOT DIFFICULT AT ALL
2. NOT BEING ABLE TO STOP OR CONTROL WORRYING: NOT AT ALL
7. FEELING AFRAID AS IF SOMETHING AWFUL MIGHT HAPPEN: NOT AT ALL
5. BEING SO RESTLESS THAT IT IS HARD TO SIT STILL: NOT AT ALL
3. WORRYING TOO MUCH ABOUT DIFFERENT THINGS: NOT AT ALL
4. TROUBLE RELAXING: NOT AT ALL
1. FEELING NERVOUS, ANXIOUS, OR ON EDGE: NOT AT ALL
GAD7 TOTAL SCORE: 0

## 2024-11-15 ASSESSMENT — PATIENT HEALTH QUESTIONNAIRE - PHQ9: SUM OF ALL RESPONSES TO PHQ QUESTIONS 1-9: 0

## 2024-11-15 ASSESSMENT — PAIN SCALES - GENERAL: PAINLEVEL_OUTOF10: NO PAIN (0)

## 2024-11-15 NOTE — PROGRESS NOTES
Assessment & Plan     1. Acute cystitis with hematuria (Primary)  Increase water intake   - ciprofloxacin (CIPRO) 500 MG tablet; Take 1 tablet (500 mg) by mouth 2 times daily for 7 days.  Dispense: 14 tablet; Refill: 0    2. Malignant neoplasm of overlapping sites of bladder (H)  Urology notes reviewed.   If hematuria does not resolve, would recommend follow-up with urology.  Last cystoscopy normal    3. Gross hematuria  - Extra Urine (LAB USE ONLY)    4. Urinary frequency  Culture pending.   - UA with Microscopic reflex to Culture - [facility]  - UA Microscopic with Reflex to Culture  - Extra Urine (LAB USE ONLY)      Nicotine/Tobacco Cessation  She reports that she has been smoking cigarettes. She has a 22.5 pack-year smoking history. She has never used smokeless tobacco.  Nicotine/Tobacco Cessation Plan  Information offered: Patient not interested at this time    Will notify of results as they are returned.     The longitudinal plan of care for the diagnosis(es)/condition(s) as documented were addressed during this visit. Due to the added complexity in care, I will continue to support Bernarda in the subsequent management and with ongoing continuity of care.    Sepideh Beebe,   Certified Adult Nurse Practitioner  [phone]      Subjective   Bernarda is a 64 year old, presenting for the following health issues:  UTI        11/15/2024    10:51 AM   Additional Questions   Roomed by cristian   Accompanied by none     HPI     Genitourinary - Female  Onset/Duration: Yesterday   Description:   Painful urination (Dysuria): No           Frequency: a little bit this past week   Blood in urine (Hematuria): YES clots   Delay in urine (Hesitency): No  Intensity: mild  Progression of Symptoms:  worsening  Accompanying Signs & Symptoms:  Fever/chills: No  Flank pain: No  Nausea and vomiting: No  Vaginal symptoms: none  Abdominal/Pelvic Pain: hx of  bladder cancer last month had bladder infection at time of cystoscopy  "  History:   History of frequent UTI s: YES  History of kidney stones: YES  Sexually Active: No  Possibility of pregnancy: No  Precipitating or alleviating factors: None  Therapies tried and outcome: Increase fluid intake 9/6/24 Macrobid 100 mg one bid       Recent Labs   Lab Test 11/05/21  1151 04/12/21  0943 03/03/21  1552 10/13/20  0908 08/24/20  1055 08/07/20  1119   LDL  --  188*  --  157*  --   --    HDL  --  62  --  71  --   --    TRIG  --  116  --  86  --   --    ALT  --   --  28 20  --  20   CR 0.71  --  0.83 0.68  --  0.98   GFRESTIMATED >90  --  77 >90  --  63   GFRESTBLACK  --   --  89 >90  --  73   POTASSIUM 3.6  --  3.7 3.9  --  3.8   TSH  --   --   --   --  0.47  --       BP Readings from Last 3 Encounters:   11/15/24 138/76   11/05/21 131/64   11/02/21 136/74    Wt Readings from Last 3 Encounters:   11/15/24 56.6 kg (124 lb 12.8 oz)   11/21/22 57.8 kg (127 lb 6.4 oz)   08/17/22 57.7 kg (127 lb 3.2 oz)                Review of Systems  Constitutional, neuro, ENT, endocrine, pulmonary, cardiac, gastrointestinal, genitourinary, musculoskeletal, integument and psychiatric systems are negative, except as otherwise noted.      Objective    /76 (BP Location: Left arm, Patient Position: Chair, Cuff Size: Adult Regular)   Pulse 83   Temp 97.3  F (36.3  C) (Tympanic)   Resp 16   Ht 1.575 m (5' 2\")   Wt 56.6 kg (124 lb 12.8 oz)   LMP 06/18/2005   SpO2 98%   BMI 22.83 kg/m    Body mass index is 22.83 kg/m .  Physical Exam   GENERAL: alert and no distress  MS: no gross musculoskeletal defects noted, no edema  PSYCH: mentation appears normal, affect normal/bright    Results for orders placed or performed in visit on 11/15/24   UA with Microscopic reflex to Culture - [facility]     Status: Abnormal    Specimen: Urine, Midstream   Result Value Ref Range    Color Urine Red (A) Colorless, Straw, Light Yellow, Yellow    Appearance Urine Turbid (A) Clear    Glucose Urine Negative Negative mg/dL    " Bilirubin Urine Negative Negative    Ketones Urine Negative Negative mg/dL    Specific Gravity Urine >=1.030 1.003 - 1.035    Blood Urine Large (A) Negative    pH Urine 6.0 5.0 - 7.0    Protein Albumin Urine 100 (A) Negative mg/dL    Urobilinogen Urine 0.2 0.2, 1.0 E.U./dL    Nitrite Urine Negative Negative    Leukocyte Esterase Urine Trace (A) Negative   UA Microscopic with Reflex to Culture     Status: Abnormal   Result Value Ref Range    Bacteria Urine Moderate (A) None Seen /HPF    RBC Urine >100 (A) 0-2 /HPF /HPF    WBC Urine 5-10 (A) 0-5 /HPF /HPF    Squamous Epithelials Urine Few (A) None Seen /LPF    Budding Yeast Urine Few (A) None Seen /HPF    Narrative    Urine Culture not indicated   Extra Urine (LAB USE ONLY)     Status: None   Result Value Ref Range    Hold Specimen JIC              Signed Electronically by: Sepideh Beebe NP

## 2025-03-17 ENCOUNTER — OFFICE VISIT (OUTPATIENT)
Dept: FAMILY MEDICINE | Facility: OTHER | Age: 65
End: 2025-03-17
Attending: NURSE PRACTITIONER
Payer: COMMERCIAL

## 2025-03-17 ENCOUNTER — NURSE TRIAGE (OUTPATIENT)
Dept: FAMILY MEDICINE | Facility: OTHER | Age: 65
End: 2025-03-17

## 2025-03-17 VITALS
BODY MASS INDEX: 21.95 KG/M2 | RESPIRATION RATE: 16 BRPM | SYSTOLIC BLOOD PRESSURE: 136 MMHG | HEART RATE: 88 BPM | HEIGHT: 62 IN | OXYGEN SATURATION: 98 % | TEMPERATURE: 98.7 F | WEIGHT: 119.27 LBS | DIASTOLIC BLOOD PRESSURE: 73 MMHG

## 2025-03-17 DIAGNOSIS — N30.01 ACUTE CYSTITIS WITH HEMATURIA: ICD-10-CM

## 2025-03-17 DIAGNOSIS — R30.0 DYSURIA: Primary | ICD-10-CM

## 2025-03-17 DIAGNOSIS — Z85.51 HISTORY OF BLADDER CANCER: ICD-10-CM

## 2025-03-17 DIAGNOSIS — C67.9 RECURRENT MALIGNANT NEOPLASM OF BLADDER (H): ICD-10-CM

## 2025-03-17 DIAGNOSIS — Z12.31 ENCOUNTER FOR SCREENING MAMMOGRAM FOR MALIGNANT NEOPLASM OF BREAST: ICD-10-CM

## 2025-03-17 DIAGNOSIS — Z12.11 SCREEN FOR COLON CANCER: ICD-10-CM

## 2025-03-17 LAB
ALBUMIN UR-MCNC: NEGATIVE MG/DL
APPEARANCE UR: CLEAR
BACTERIA #/AREA URNS HPF: ABNORMAL /HPF
BILIRUB UR QL STRIP: NEGATIVE
COLOR UR AUTO: YELLOW
GLUCOSE UR STRIP-MCNC: NEGATIVE MG/DL
HGB UR QL STRIP: ABNORMAL
KETONES UR STRIP-MCNC: NEGATIVE MG/DL
LEUKOCYTE ESTERASE UR QL STRIP: ABNORMAL
NITRATE UR QL: NEGATIVE
PH UR STRIP: 7 [PH] (ref 5–7)
RBC #/AREA URNS AUTO: ABNORMAL /HPF
SP GR UR STRIP: 1.02 (ref 1–1.03)
SQUAMOUS #/AREA URNS AUTO: ABNORMAL /LPF
UROBILINOGEN UR STRIP-ACNC: 1 E.U./DL
WBC #/AREA URNS AUTO: ABNORMAL /HPF

## 2025-03-17 RX ORDER — CIPROFLOXACIN 500 MG/1
500 TABLET, FILM COATED ORAL 2 TIMES DAILY
Qty: 14 TABLET | Refills: 0 | Status: SHIPPED | OUTPATIENT
Start: 2025-03-17 | End: 2025-03-24

## 2025-03-17 ASSESSMENT — PAIN SCALES - GENERAL: PAINLEVEL_OUTOF10: NO PAIN (0)

## 2025-03-17 NOTE — PATIENT INSTRUCTIONS
Assessment & Plan       Dysuria  - UA Microscopic with Reflex to Culture  - Urine Culture; Future      Acute cystitis with hematuria  - ciprofloxacin (CIPRO) 500 MG tablet; Take 1 tablet (500 mg) by mouth 2 times daily for 7 days.      Encounter for screening mammogram for malignant neoplasm of breast  - [place] Screening Bilateral w/ [facility]; Future      Screen for colon cancer  - Colonoscopy Screening  Referral; Future      History of bladder cancer  - Adult Urology  Referral; Future      Recurrent malignant neoplasm of bladder (H)  - Adult Urology  Referral; Future        Results for orders placed or performed in visit on 03/17/25   UA Macroscopic with reflex to Microscopic and Culture     Status: Abnormal    Specimen: Urine, Midstream   Result Value Ref Range    Color Urine Yellow Colorless, Straw, Light Yellow, Yellow    Appearance Urine Clear Clear    Glucose Urine Negative Negative mg/dL    Bilirubin Urine Negative Negative    Ketones Urine Negative Negative mg/dL    Specific Gravity Urine 1.020 1.003 - 1.035    Blood Urine Moderate (A) Negative    pH Urine 7.0 5.0 - 7.0    Protein Albumin Urine Negative Negative mg/dL    Urobilinogen Urine 1.0 0.2, 1.0 E.U./dL    Nitrite Urine Negative Negative    Leukocyte Esterase Urine Small (A) Negative   UA Microscopic with Reflex to Culture     Status: Abnormal   Result Value Ref Range    Bacteria Urine Few (A) None Seen /HPF    RBC Urine 10-25 (A) 0-2 /HPF /HPF    WBC Urine 5-10 (A) 0-5 /HPF /HPF    Squamous Epithelials Urine Few (A) None Seen /LPF    Narrative    Urine Culture not indicated            Please continue to take all medication as directed  Please notify your pharmacy or our refill line of future refills needed.  Please return sooner than your next scheduled appointment with any concerns.      When your lab results return, we will call you with abnormal findings  You can also view this information in your MyChart, if you have an  account.  Please call or Better[employer] message us with any questions you may have.        Itzel MANCILLA  [phone]

## 2025-03-17 NOTE — TELEPHONE ENCOUNTER
Symptom or reason needing to speak to RN: Patient states that she has blood in urine     Best number to return call: [phone]     Best time to return call: Anytime today

## 2025-03-17 NOTE — TELEPHONE ENCOUNTER
Nurse Triage SBAR    Is this a 2nd Level Triage? NO    Situation: hematuria starting 3/16/25    Background: hematuria, may have slight frequency/urgency. Denies fever, flank pain, pain with urination.     Assessment: Hematuria - rule out UTI     Protocol Recommended Disposition:   See in Office Today    Recommendation: See in Office Today - RN spoke with Itzel Phillip CNP- ok to schedule Office Visit today.      Discussed in person     Does the patient meet one of the following criteria for ADS visit consideration?      No    Next 5 appointments (look out 90 days)      Mar 17, 2025 1:15 PM  (Arrive by 1:00 PM)  Provider Visit with Itzel Phillip CNP  [facility] ([facility] ) [address]  [facility] [address]  [phone]

## 2025-03-17 NOTE — PROGRESS NOTES
Assessment & Plan       Dysuria  - UA Microscopic with Reflex to Culture  - Urine Culture; Future      Acute cystitis with hematuria  - ciprofloxacin (CIPRO) 500 MG tablet; Take 1 tablet (500 mg) by mouth 2 times daily for 7 days.      Encounter for screening mammogram for malignant neoplasm of breast  - [place] Screening Bilateral w/ [facility]; Future      Screen for colon cancer  - Colonoscopy Screening  Referral; Future      History of bladder cancer  - Adult Urology  Referral; Future      Recurrent malignant neoplasm of bladder (H)  - Adult Urology  Referral; Future        Results for orders placed or performed in visit on 03/17/25   UA Macroscopic with reflex to Microscopic and Culture     Status: Abnormal    Specimen: Urine, Midstream   Result Value Ref Range    Color Urine Yellow Colorless, Straw, Light Yellow, Yellow    Appearance Urine Clear Clear    Glucose Urine Negative Negative mg/dL    Bilirubin Urine Negative Negative    Ketones Urine Negative Negative mg/dL    Specific Gravity Urine 1.020 1.003 - 1.035    Blood Urine Moderate (A) Negative    pH Urine 7.0 5.0 - 7.0    Protein Albumin Urine Negative Negative mg/dL    Urobilinogen Urine 1.0 0.2, 1.0 E.U./dL    Nitrite Urine Negative Negative    Leukocyte Esterase Urine Small (A) Negative   UA Microscopic with Reflex to Culture     Status: Abnormal   Result Value Ref Range    Bacteria Urine Few (A) None Seen /HPF    RBC Urine 10-25 (A) 0-2 /HPF /HPF    WBC Urine 5-10 (A) 0-5 /HPF /HPF    Squamous Epithelials Urine Few (A) None Seen /LPF    Narrative    Urine Culture not indicated            Please continue to take all medication as directed  Please notify your pharmacy or our refill line of future refills needed.  Please return sooner than your next scheduled appointment with any concerns.      When your lab results return, we will call you with abnormal findings  You can also view this information in your MyChart, if you have an  account.  Please call or [employer] message us with any questions you may have.        Itzel Phillip -[facility]  [phone]           Bernarda is a 64 year old, presenting for the following health issues:  UTI        Genitourinary - Female  Onset/Duration: 1 week  Description:   Painful urination (Dysuria): No           Frequency: YES  Blood in urine (Hematuria): YES- Red with clots, and pink  Delay in urine (Hesitency): YES  Intensity: moderate  Progression of Symptoms:  worsening  Accompanying Signs & Symptoms:  Fever/chills: No  Flank pain: No  Nausea and vomiting: No  Vaginal symptoms: none  Abdominal/Pelvic Pain: No  History:   History of frequent UTI s: YES  History of kidney stones: YES  Sexually Active: No  Possibility of pregnancy: No  Precipitating or alleviating factors: None  Therapies tried and outcome: none        Patient Active Problem List   Diagnosis    H/O renal calculi    Malignant neoplasm of overlapping sites of bladder (H)    ACP (advance care planning)    Urothelial carcinoma of kidney, right (H)    History of bladder cancer    Bladder tumor    Recurrent malignant neoplasm of bladder (H)     Past Surgical History:   Procedure Laterality Date    CYSTOSCOPY, RETROGRADES, INSERT STENT URETER(S), COMBINED Right 12/8/2015    Procedure: COMBINED CYSTOSCOPY, RETROGRADES, INSERT STENT URETER(S);  Surgeon: Efren Couch MD;  Location: [facility] OR    CYSTOSCOPY, RETROGRADES, INSERT STENT URETER(S), COMBINED Bilateral 8/4/2020    Procedure: bilateral retrograde pyelogram;  Surgeon: Delfino Walker MD;  Location:  OR    CYSTOSCOPY, TRANSURETHRAL RESECTION (TUR) PROSTATE, COMBINED Bilateral 5/7/2019    Procedure: Transurethral Resection of Bladder Tumor, BILATERAL RETROGRADE PYELOGRAMS, RIGHT URETEROSCOPY WITH HOLMIUM LASER TUMOR ABLATION AND STENT PLACEMENT;  Surgeon: Delfino Walker MD;  Location:  OR    CYSTOSCOPY, TRANSURETHRAL RESECTION (TUR) TUMOR BLADDER INSTILL CHEMOTHERAPY, COMBINED N/A 8/4/2020     Procedure: Transurethral resection of bladder tumor;  Surgeon: Delfino Walker MD;  Location: [facility] OR    CYSTOSCOPY, TRANSURETHRAL RESECTION (TUR) TUMOR BLADDER INSTILL CHEMOTHERAPY, COMBINED Bilateral 3/9/2021    Procedure: Transurethral resection of bladder tumor, bilateral retrograde pyelograms and gemcitabine instillation;  Surgeon: Delfino Walker MD;  Location: [facility] OR    CYSTOSCOPY, TRANSURETHRAL RESECTION (TUR) TUMOR BLADDER, COMBINED N/A 2/10/2015    Procedure: COMBINED CYSTOSCOPY, TRANSURETHRAL RESECTION (TUR) TUMOR BLADDER;  Surgeon: Efren Couch MD;  Location: [facility] OR    CYSTOSCOPY, TRANSURETHRAL RESECTION (TUR) TUMOR BLADDER, COMBINED N/A 12/8/2015    Procedure: COMBINED CYSTOSCOPY, TRANSURETHRAL RESECTION (TUR) TUMOR BLADDER;  Surgeon: Efren Couch MD;  Location: [facility]    CYSTOSCOPY, TRANSURETHRAL RESECTION (TUR) TUMOR BLADDER, COMBINED Bilateral 11/2/2021    Procedure: Transurethral resection of bladder tumor and bilateral retrograde pyelograms with left stent placement;  Surgeon: Delfnio Walker MD;  Location:  OR    CYSTOSCOPY, URETEROSCOPY, COMBINED Right 5/28/2019    Procedure: Right Ureteroscopy, Fulgaration if Right iUpper Tract Urothelial Carcinoma;  Surgeon: Delfino Walker MD;  Location: [facility]    CYSTOSCOPY, URETEROSCOPY, COMBINED Right 8/4/2020    Procedure: diagnostic ureteroscopy,ablation of urothelial tumor, possible fulguration through flexible ureteroscope;  Surgeon: Delfino Walker MD;  Location:  OR    ZZC LIGATE FALLOPIAN TUBE  1996       Social History     Tobacco Use    Smoking status: Every Day     Current packs/day: 0.50     Average packs/day: 0.5 packs/day for 45.0 years (22.5 ttl pk-yrs)     Types: Cigarettes    Smokeless tobacco: Never   Substance Use Topics    Alcohol use: Yes     Comment: rare     Family History   Problem Relation Age of Onset    C.A.D. Father         dx late 60's, CABG, angioplasty    Cancer Father         Lung, dx age 80 h/o tobacco use  "   Heart Disease Mother     Thyroid Disease Mother     Lipids Sister              Current Outpatient Medications   Medication Sig Dispense Refill    metroNIDAZOLE (METROGEL) 1 % external gel Apply topically daily 60 g 1         Allergies   Allergen Reactions    No Known Allergies          Recent Labs   Lab Test 11/05/21  1151 04/12/21  0943 03/03/21  1552 10/13/20  0908 08/24/20  1055 08/07/20  1119   LDL  --  188*  --  157*  --   --    HDL  --  62  --  71  --   --    TRIG  --  116  --  86  --   --    ALT  --   --  28 20  --  20   CR 0.71  --  0.83 0.68  --  0.98   GFRESTIMATED >90  --  77 >90  --  63   GFRESTBLACK  --   --  89 >90  --  73   POTASSIUM 3.6  --  3.7 3.9  --  3.8   TSH  --   --   --   --  0.47  --           BP Readings from Last 3 Encounters:   03/17/25 136/73   11/15/24 138/76   11/05/21 131/64    Wt Readings from Last 3 Encounters:   03/17/25 54.1 kg (119 lb 4.3 oz)   11/15/24 56.6 kg (124 lb 12.8 oz)   11/21/22 57.8 kg (127 lb 6.4 oz)                       Review of Systems  Constitutional, neuro, ENT, endocrine, pulmonary, cardiac, gastrointestinal, genitourinary, musculoskeletal, integument and psychiatric systems are negative, except as otherwise noted.          Objective    /73 (BP Location: Left arm, Patient Position: Sitting, Cuff Size: Adult Regular)   Pulse 88   Temp 98.7  F (37.1  C) (Tympanic)   Resp 16   Ht 1.575 m (5' 2\")   Wt 54.1 kg (119 lb 4.3 oz)   LMP 06/18/2005   SpO2 98%   Breastfeeding No   BMI 21.81 kg/m    Body mass index is 21.81 kg/m .        Physical Exam   GENERAL: alert and no distress  EYES: Eyes grossly normal to inspection, PERRL and conjunctivae and sclerae normal  HENT: ear canals and TM's normal, nose and mouth without ulcers or lesions  NECK: no adenopathy, no asymmetry, masses, or scars  RESP: lungs clear to auscultation - no rales, rhonchi or wheezes  CV: regular rate and rhythm, normal S1 S2, no S3 or S4, no murmur, click or rub, no peripheral " edema  SKIN: no suspicious lesions or rashes  PSYCH: mentation appears normal, affect normal/bright        Results for orders placed or performed in visit on 03/17/25   UA Macroscopic with reflex to Microscopic and Culture     Status: Abnormal    Specimen: Urine, Midstream   Result Value Ref Range    Color Urine Yellow Colorless, Straw, Light Yellow, Yellow    Appearance Urine Clear Clear    Glucose Urine Negative Negative mg/dL    Bilirubin Urine Negative Negative    Ketones Urine Negative Negative mg/dL    Specific Gravity Urine 1.020 1.003 - 1.035    Blood Urine Moderate (A) Negative    pH Urine 7.0 5.0 - 7.0    Protein Albumin Urine Negative Negative mg/dL    Urobilinogen Urine 1.0 0.2, 1.0 E.U./dL    Nitrite Urine Negative Negative    Leukocyte Esterase Urine Small (A) Negative   UA Microscopic with Reflex to Culture     Status: Abnormal   Result Value Ref Range    Bacteria Urine Few (A) None Seen /HPF    RBC Urine 10-25 (A) 0-2 /HPF /HPF    WBC Urine 5-10 (A) 0-5 /HPF /HPF    Squamous Epithelials Urine Few (A) None Seen /LPF    Narrative    Urine Culture not indicated              The longitudinal plan of care for the diagnosis(es)/condition(s) as documented were addressed during this visit. Due to the added complexity in care, I will continue to support Bernarda in the subsequent management and with ongoing continuity of care.         Signed Electronically by: Itzel Phillip CNP

## 2025-03-17 NOTE — TELEPHONE ENCOUNTER
"Reason for Disposition    Urinating more frequently than usual (i.e., frequency) OR new-onset of the feeling of an urgent need to urinate (i.e., urgency)    Additional Information    Negative: Shock suspected (e.g., cold/pale/clammy skin, too weak to stand, low BP, rapid pulse)    Negative: Sounds like a life-threatening emergency to the triager    Negative: Followed a female genital area injury (e.g., labia, vagina, vulva)    Negative: Followed a male genital area injury (penis, scrotum)    Negative: Vaginal discharge    Negative: Pus (white, yellow) or bloody discharge from end of penis    Negative: Pain or burning with passing urine (urination) and pregnant    Negative: Pain or burning with passing urine (urination) and female    Negative: Pain or burning with passing urine (urination) and male    Negative: Pain or itching in the vulvar area    Negative: Pain in scrotum is main symptom    Negative: Blood in the urine is main symptom    Negative: Symptoms arising from use of a urinary catheter (e.g., coude, Villaseñor)    Negative: Unable to urinate (or only a few drops) > 4 hours and bladder feels very full (e.g., palpable bladder or strong urge to urinate)    Negative: Decreased urination and drinking very little and dehydration suspected (e.g., dark urine, no urine > 12 hours, very dry mouth, very lightheaded)    Negative: Patient sounds very sick or weak to the triager    Negative: Fever > 100.4 F  (38.0 C)    Negative: Side (flank) or lower back pain present    Negative: Bad or foul-smelling urine    Answer Assessment - Initial Assessment Questions  1. SYMPTOM: \"What's the main symptom you're concerned about?\" (e.g., frequency, incontinence)      Blood in urine, maybe a little urgency     2. ONSET: \"When did the  hematuria  start?\"      3/16/25    3. PAIN: \"Is there any pain?\" If Yes, ask: \"How bad is it?\" (Scale: 1-10; mild, moderate, severe)      Denies pain     4. CAUSE: \"What do you think is causing the " "symptoms?\"      Possible UTI     5. OTHER SYMPTOMS: \"Do you have any other symptoms?\" (e.g., blood in urine, fever, flank pain, pain with urination)      Blood in urine, maybe a little urgency       Denies flank pain, fever or pain with urination     6. PREGNANCY: \"Is there any chance you are pregnant?\" \"When was your last menstrual period?\"      No    Protocols used: Urinary Symptoms-A-OH    "

## 2025-03-18 ENCOUNTER — TELEPHONE (OUTPATIENT)
Dept: FAMILY MEDICINE | Facility: OTHER | Age: 65
End: 2025-03-18

## 2025-03-19 ENCOUNTER — TELEPHONE (OUTPATIENT)
Dept: FAMILY MEDICINE | Facility: OTHER | Age: 65
End: 2025-03-19

## 2025-03-19 LAB — BACTERIA UR CULT: NORMAL

## 2025-03-19 NOTE — RESULT ENCOUNTER NOTE
Mixed ajit on UA  Can continue Cipro if it is making her symptoms better      Itzel [facility]YAL-[facility]  [phone]

## 2025-03-24 ENCOUNTER — OFFICE VISIT (OUTPATIENT)
Dept: UROLOGY | Facility: OTHER | Age: 65
End: 2025-03-24
Attending: UROLOGY
Payer: COMMERCIAL

## 2025-03-24 VITALS — SYSTOLIC BLOOD PRESSURE: 130 MMHG | DIASTOLIC BLOOD PRESSURE: 80 MMHG | HEART RATE: 78 BPM | OXYGEN SATURATION: 97 %

## 2025-03-24 DIAGNOSIS — C64.1 UROTHELIAL CARCINOMA OF KIDNEY, RIGHT (H): ICD-10-CM

## 2025-03-24 DIAGNOSIS — C67.8 MALIGNANT NEOPLASM OF OVERLAPPING SITES OF BLADDER (H): Primary | ICD-10-CM

## 2025-03-24 RX ORDER — SULFAMETHOXAZOLE AND TRIMETHOPRIM 800; 160 MG/1; MG/1
1 TABLET ORAL ONCE
Status: DISPENSED | OUTPATIENT
Start: 2025-03-24

## 2025-03-24 ASSESSMENT — PAIN SCALES - GENERAL: PAINLEVEL_OUTOF10: NO PAIN (0)

## 2025-03-24 NOTE — PROGRESS NOTES
HPI:    Kait Augustin is a 64 year old woman seen today for evaluation of bladder cancer.  She is seen at the request of Itzel Phillip.    She was initially diagnosed with bladder cancer in January 2015.      2/10/15 initial resection included right ureteral orifice and a stent was placed.  Ta low grade.  12/8/16 TURBT with mitomycin, Ta low grade  5/7/19 recurrence near right UO resected, also holmium laser ablation of tumor in right renal pelvis - low grade Ta  5/28/19 laser ablation of lower pole right upper tract tumor - high grade Ta  6/2019 induction BCG  8/4/2020 TURBT and laser ablation of right upper tract tumor - high grade Ta  3/9/2021 TURBT and laser ablation of right upper tract tumor   Referred to [place] for Jelmyto upper tract (?pt refused or wasn't covered)  11/2/21 TURBT - high grade Ta      Her last cystoscopy was 9/3/24 with Dr. Nate Leblanc.  No evidence for recurrence.  Last imaging was 2023.  Plan from that note was to repeat upper tract imaging around March 2025.     Today, she presents to [facility] care.  She reports that she has had some Utis with blood in the urine for the last month or so.  No dysuria or pain.  No flank pain.  She confirms she has not had additional imaging since a couple years ago.    She continues to smoke approx 12 cigarettes per day.      ROS:    Pertinent positives and negatives in HPI.    Patient Active Problem List   Diagnosis    H/O renal calculi    Malignant neoplasm of overlapping sites of bladder (H)    ACP (advance care planning)    Urothelial carcinoma of kidney, right (H)    History of bladder cancer    Bladder tumor    Recurrent malignant neoplasm of bladder (H)         Past Medical History:   Diagnosis Date    Cancer (H)     Bladder Cancer    H/O renal calculi 11/17/2014    MIGRAINE HEADACHES 1996    Nicotine dependence 11/17/2014    PONV (postoperative nausea and vomiting)     Tobacco abuse 11/17/2014       Past Surgical History:   Procedure  Laterality Date    CYSTOSCOPY, RETROGRADES, INSERT STENT URETER(S), COMBINED Right 12/8/2015    Procedure: COMBINED CYSTOSCOPY, RETROGRADES, INSERT STENT URETER(S);  Surgeon: Efren Couch MD;  Location: [facility]    CYSTOSCOPY, RETROGRADES, INSERT STENT URETER(S), COMBINED Bilateral 8/4/2020    Procedure: bilateral retrograde pyelogram;  Surgeon: Delfino Walker MD;  Location: [facility]    CYSTOSCOPY, TRANSURETHRAL RESECTION (TUR) PROSTATE, COMBINED Bilateral 5/7/2019    Procedure: Transurethral Resection of Bladder Tumor, BILATERAL RETROGRADE PYELOGRAMS, RIGHT URETEROSCOPY WITH HOLMIUM LASER TUMOR ABLATION AND STENT PLACEMENT;  Surgeon: Delfino Walker MD;  Location: [facility]    CYSTOSCOPY, TRANSURETHRAL RESECTION (TUR) TUMOR BLADDER INSTILL CHEMOTHERAPY, COMBINED N/A 8/4/2020    Procedure: Transurethral resection of bladder tumor;  Surgeon: Delfino Walker MD;  Location: [facility]    CYSTOSCOPY, TRANSURETHRAL RESECTION (TUR) TUMOR BLADDER INSTILL CHEMOTHERAPY, COMBINED Bilateral 3/9/2021    Procedure: Transurethral resection of bladder tumor, bilateral retrograde pyelograms and gemcitabine instillation;  Surgeon: Delfino Walker MD;  Location[facility]    CYSTOSCOPY, TRANSURETHRAL RESECTION (TUR) TUMOR BLADDER, COMBINED N/A 2/10/2015    Procedure: COMBINED CYSTOSCOPY, TRANSURETHRAL RESECTION (TUR) TUMOR BLADDER;  Surgeon: Efren Couch MD;  Location: [facility]    CYSTOSCOPY, TRANSURETHRAL RESECTION (TUR) TUMOR BLADDER, COMBINED N/A 12/8/2015    Procedure: COMBINED CYSTOSCOPY, TRANSURETHRAL RESECTION (TUR) TUMOR BLADDER;  Surgeon: Efren Couch MD;  Location: [facility]    CYSTOSCOPY, TRANSURETHRAL RESECTION (TUR) TUMOR BLADDER, COMBINED Bilateral 11/2/2021    Procedure: Transurethral resection of bladder tumor and bilateral retrograde pyelograms with left stent placement;  Surgeon: Delfino Walker MD;  Location: [facility]    CYSTOSCOPY, URETEROSCOPY, COMBINED Right 5/28/2019    Procedure: Right Ureteroscopy,  Fulgaration if Right iUpper Tract Urothelial Carcinoma;  Surgeon: Delfino Walker MD;  Location:  OR    CYSTOSCOPY, URETEROSCOPY, COMBINED Right 8/4/2020    Procedure: diagnostic ureteroscopy,ablation of urothelial tumor, possible fulguration through flexible ureteroscope;  Surgeon: Delfino Walker MD;  Location:  OR    ZZC LIGATE FALLOPIAN TUBE  1996       Family History   Problem Relation Age of Onset    C.A.D. Father         dx late 60's, CABG, angioplasty    Cancer Father         Lung, dx age 80 h/o tobacco use    Heart Disease Mother     Thyroid Disease Mother     Lipids Sister         Social History     Tobacco Use    Smoking status: Every Day     Current packs/day: 0.50     Average packs/day: 0.5 packs/day for 45.0 years (22.5 ttl pk-yrs)     Types: Cigarettes    Smokeless tobacco: Never   Vaping Use    Vaping status: Never Used   Substance Use Topics    Alcohol use: Yes     Comment: rare    Drug use: No        Current Outpatient Medications   Medication Sig Dispense Refill    ciprofloxacin (CIPRO) 500 MG tablet Take 1 tablet (500 mg) by mouth 2 times daily for 7 days. 14 tablet 0    metroNIDAZOLE (METROGEL) 1 % external gel Apply topically daily 60 g 1     Current Facility-Administered Medications   Medication Dose Route Frequency Provider Last Rate Last Admin    sulfamethoxazole-trimethoprim (BACTRIM DS) 800-160 MG per tablet 1 tablet  1 tablet Oral Once            Exam:  LMP 06/18/2005   Gen: Pleasant, NAD  HEENT: WNL  Resp: nonlabored on RA  Abd: soft, ND, NT to palpation  : Normal external genitalia without lesions.  Urethral meatus is normal.  Supine stress test is normal.  No significant prolapse is present.  There is no significant atrophy.  No pelvic floor tenderness.      Results/Data:    Last UA 3/17 showed moderate blood, small leuks, 5-10 WBC, 10-25 RBC, few bacteria, few squams.  Culture 10-50 mixed.      Imaging:  No new imaging    Procedure: flexible cystourethroscopy    Indication:  history of bladder cancer    Antibiotic: bactrim ds    Timeout: Yes    Procedure in Detail:  Consent was obtained for the procedure.  The patient was positioned and prepped with betadine.  Sterile drapes were placed.  A lubricated flexible cystoscope was introduced per urethra.  The urethra was normal.  Upon entering the bladder, it was surveyed in its entirety.  Dome of bladder with small area of papillary change.  Slightly erythematous.  Right UO was previously resected, small redness visible just within but no price tumor.  No efflux seen. The urethra was inspected as the scope was removed.  The patient tolerated the procedure well.      Assessment and Plan:    Kait Augustin is a 64 year old woman seen today for the following:       Malignant neoplasm of overlapping sites of bladder (H)  Urothelial carcinoma of kidney, right (H)    Small papillary area in bladder with some erythema. Recommended biopsy in OR.  She should also have surveillance ureteroscopy on right side.      Would recommend we start with CT urogram in case of any obvious abnormalities.  Will order this and wait to schedule Or based on results.    Advised patient that continuing to smoke increases her risk of recurrence and progression of her bladder cancer.

## 2025-04-02 ENCOUNTER — HOSPITAL ENCOUNTER (OUTPATIENT)
Dept: CT IMAGING | Facility: HOSPITAL | Age: 65
Discharge: HOME | End: 2025-04-02
Attending: UROLOGY
Payer: COMMERCIAL

## 2025-04-02 DIAGNOSIS — C67.8 MALIGNANT NEOPLASM OF OVERLAPPING SITES OF BLADDER (H): ICD-10-CM

## 2025-04-02 DIAGNOSIS — C64.1 UROTHELIAL CARCINOMA OF KIDNEY, RIGHT (H): ICD-10-CM

## 2025-04-02 PROCEDURE — 250N000011 HC RX IP 250 OP 636: Performed by: RADIOLOGY

## 2025-04-02 PROCEDURE — 74178 CT ABD&PLV WO CNTR FLWD CNTR: CPT | Mod: 26 | Performed by: RADIOLOGY

## 2025-04-02 PROCEDURE — 74178 CT ABD&PLV WO CNTR FLWD CNTR: CPT

## 2025-04-02 RX ORDER — IOPAMIDOL 755 MG/ML
97 INJECTION, SOLUTION INTRAVASCULAR ONCE
Status: COMPLETED | OUTPATIENT
Start: 2025-04-02 | End: 2025-04-02

## 2025-04-02 RX ADMIN — IOPAMIDOL 97 ML: 755 INJECTION, SOLUTION INTRAVENOUS at 15:51

## 2025-04-08 ENCOUNTER — TELEPHONE (OUTPATIENT)
Dept: FAMILY MEDICINE | Facility: OTHER | Age: 65
End: 2025-04-08

## 2025-04-08 ENCOUNTER — PREP FOR PROCEDURE (OUTPATIENT)
Dept: UROLOGY | Facility: OTHER | Age: 65
End: 2025-04-08

## 2025-04-08 DIAGNOSIS — Z85.51 PERSONAL HISTORY OF MALIGNANT NEOPLASM OF BLADDER: Primary | ICD-10-CM

## 2025-04-08 DIAGNOSIS — C68.9 UROTHELIAL CARCINOMA (H): ICD-10-CM

## 2025-04-08 NOTE — PROGRESS NOTES
cysto, bladder biopsies, right retrograde pyelogram, ureteroscopy, possible biopsy, stent performed by Dr. Leblanc  Scheduled 4/14   Needs pre-op, advised pt to schedule with primary  Pre-op education will be provided over the phone and info mailed to her    Thank you,     Amber BARBER, BSN, PHN  RN Care Coordinator Urology  [facility]

## 2025-04-09 ENCOUNTER — ANESTHESIA EVENT (OUTPATIENT)
Dept: SURGERY | Facility: HOSPITAL | Age: 65
End: 2025-04-09
Payer: COMMERCIAL

## 2025-04-09 ASSESSMENT — LIFESTYLE VARIABLES: TOBACCO_USE: 1

## 2025-04-09 NOTE — ANESTHESIA PREPROCEDURE EVALUATION
Anesthesia Pre-Procedure Evaluation    Patient: Kait Augustin   MRN: [id number] : 1960        Procedure : Procedure(s):  CYSTOSCOPY, WITH BLADDER BIOPSY  CYSTOURETEROSCOPY, WITH RETROGRADE PYELOGRAM AND STENT INSERTION          Past Medical History:   Diagnosis Date     Cancer (H)     Bladder Cancer     H/O renal calculi 2014     MIGRAINE HEADACHES 1996     Nicotine dependence 2014     PONV (postoperative nausea and vomiting)      Tobacco abuse 2014      Past Surgical History:   Procedure Laterality Date     CYSTOSCOPY, RETROGRADES, INSERT STENT URETER(S), COMBINED Right 2015    Procedure: COMBINED CYSTOSCOPY, RETROGRADES, INSERT STENT URETER(S);  Surgeon: Efren Couch MD;  [facility]     CYSTOSCOPY, RETROGRADES, INSERT STENT URETER(S), COMBINED Bilateral 2020    Procedure: bilateral retrograde pyelogram;  Surgeon: Delfino Walker MD;  [facility]: [facility]     CYSTOSCOPY, TRANSURETHRAL RESECTION (TUR) PROSTATE, COMBINED Bilateral 2019    Procedure: Transurethral Resection of Bladder Tumor, BILATERAL RETROGRADE PYELOGRAMS, RIGHT URETEROSCOPY WITH HOLMIUM LASER TUMOR ABLATION AND STENT PLACEMENT;  Surgeon: Delfino Walker MD;  [facility]     CYSTOSCOPY, TRANSURETHRAL RESECTION (TUR) TUMOR BLADDER INSTILL CHEMOTHERAPY, COMBINED N/A 2020    Procedure: Transurethral resection of bladder tumor;  Surgeon: Delfino Walker MD;  [facility]     CYSTOSCOPY, TRANSURETHRAL RESECTION (TUR) TUMOR BLADDER INSTILL CHEMOTHERAPY, COMBINED Bilateral 3/9/2021    Procedure: Transurethral resection of bladder tumor, bilateral retrograde pyelograms and gemcitabine instillation;  Surgeon: Delfino Walker MD;  [facility]     CYSTOSCOPY, TRANSURETHRAL RESECTION (TUR) TUMOR BLADDER, COMBINED N/A 2/10/2015    Procedure: COMBINED CYSTOSCOPY, TRANSURETHRAL RESECTION (TUR) TUMOR BLADDER;  Surgeon: Efren Couch MD;  [facility]     CYSTOSCOPY, TRANSURETHRAL  RESECTION (TUR) TUMOR BLADDER, COMBINED N/A 12/8/2015    Procedure: COMBINED CYSTOSCOPY, TRANSURETHRAL RESECTION (TUR) TUMOR BLADDER;  Surgeon: Efren Couch MD;  [facility]     CYSTOSCOPY, TRANSURETHRAL RESECTION (TUR) TUMOR BLADDER, COMBINED Bilateral 11/2/2021    Procedure: Transurethral resection of bladder tumor and bilateral retrograde pyelograms with left stent placement;  Surgeon: Delfino Walker MD;  [facility]     CYSTOSCOPY, URETEROSCOPY, COMBINED Right 5/28/2019    Procedure: Right Ureteroscopy, Fulgaration if Right iUpper Tract Urothelial Carcinoma;  Surgeon: Delfino Walker MD;  [facility]     CYSTOSCOPY, URETEROSCOPY, COMBINED Right 8/4/2020    Procedure: diagnostic ureteroscopy,ablation of urothelial tumor, possible fulguration through flexible ureteroscope;  Surgeon: Delfino Walker MD;  Location:  OR     ZZC LIGATE FALLOPIAN TUBE  1996      Allergies   Allergen Reactions     No Known Allergies       Social History     Tobacco Use     Smoking status: Every Day     Current packs/day: 0.50     Average packs/day: 0.5 packs/day for 45.0 years (22.5 ttl pk-yrs)     Types: Cigarettes     Smokeless tobacco: Never   Substance Use Topics     Alcohol use: Yes     Comment: rare      Wt Readings from Last 1 Encounters:   03/17/25 54.1 kg (119 lb 4.3 oz)        Anesthesia Evaluation   Pt has had prior anesthetic. Type: General.    History of anesthetic complications  - PONV.      ROS/MED HX  ENT/Pulmonary:     (+)                tobacco use, Current use, 1 packs/day, 23  Pack-Year Hx,                      Neurologic:     (+)      migraines,                          Cardiovascular:  - neg cardiovascular ROS   (+)  - -   -  - -                                 Previous cardiac testing   Echo: Date: Results:    Stress Test:  Date: Results:    ECG Reviewed:  Date:  Results:  nsr  Cath:  Date: Results:      METS/Exercise Tolerance: 4 - Raking leaves, gardening    Hematologic:  - neg  "hematologic  ROS     Musculoskeletal:  - neg musculoskeletal ROS     GI/Hepatic:  - neg GI/hepatic ROS     Renal/Genitourinary: Comment: Hx Urothelial carcinoma of kidney    (+) renal disease,      Nephrolithiasis ,       Endo:  - neg endo ROS     Psychiatric/Substance Use:  - neg psychiatric ROS   (+)     Recreational drug usage: Cannabis (none today).    Infectious Disease:  - neg infectious disease ROS     Malignancy: Comment: Right kidney cancer  (+) Malignancy, History of Other.Other CA Bladder Active status post Surgery.    Other:  - neg other ROS          Physical Exam    Airway        Mallampati: II   TM distance: > 3 FB   Neck ROM: full   Mouth opening: > 3 cm    Respiratory Devices and Support         Dental       (+) Modest Abnormalities - crowns, retainers, 1 or 2 missing teeth      Cardiovascular   cardiovascular exam normal       Rhythm and rate: regular and normal     Pulmonary   pulmonary exam normal        breath sounds clear to auscultation       OUTSIDE LABS:  CBC:   Lab Results   Component Value Date    WBC 11.5 (H) 11/05/2021    WBC 7.4 04/12/2021    HGB 13.1 11/05/2021    HGB 13.6 04/12/2021    HCT 40.3 11/05/2021    HCT 40.7 04/12/2021     11/05/2021     04/12/2021     BMP:   Lab Results   Component Value Date     11/05/2021     03/03/2021    POTASSIUM 3.6 11/05/2021    POTASSIUM 3.7 03/03/2021    CHLORIDE 111 (H) 11/05/2021    CHLORIDE 107 03/03/2021    CO2 26 11/05/2021    CO2 28 03/03/2021    BUN 13 11/05/2021    BUN 17 03/03/2021    CR 0.71 11/05/2021    CR 0.83 03/03/2021     (H) 11/05/2021     (H) 03/03/2021     COAGS:   Lab Results   Component Value Date    INR 1.0 01/23/2015     POC: No results found for: \"BGM\", \"HCG\", \"HCGS\"  HEPATIC:   Lab Results   Component Value Date    ALBUMIN 4.3 03/03/2021    PROTTOTAL 7.3 03/03/2021    ALT 28 03/03/2021    AST 20 03/03/2021    ALKPHOS 121 03/03/2021    BILITOTAL 0.6 03/03/2021     OTHER:   Lab Results "   Component Value Date    LONG 8.7 11/05/2021    PHOS 3.7 05/22/2019    TSH 0.47 08/24/2020       Anesthesia Plan    ASA Status:  2    NPO Status:  NPO Appropriate    Anesthesia Type: General.     - Airway: LMA   Induction: Intravenous.   Maintenance: TIVA.        Consents    Anesthesia Plan(s) and associated risks, benefits, and realistic alternatives discussed. Questions answered and patient/representative(s) expressed understanding.     - Discussed: Risks, Benefits and Alternatives for BOTH SEDATION and the PROCEDURE were discussed     - Discussed with:  Patient      - Extended Intubation/Ventilatory Support Discussed: No.      - Patient is DNR/DNI Status: No     Use of blood products discussed: No .     Postoperative Care       PONV prophylaxis: Ondansetron (or other 5HT-3), Dexamethasone or Solumedrol, Scopolamine patch, Aprepitant     Comments:    Other Comments: Itzel Wallace  4/11/25            AISSATOU Moreira CRNA    Clinically Significant Risk Factors Present on Admission

## 2025-04-14 ENCOUNTER — ANESTHESIA (OUTPATIENT)
Dept: SURGERY | Facility: HOSPITAL | Age: 65
End: 2025-04-14
Payer: COMMERCIAL

## 2025-04-14 ENCOUNTER — HOSPITAL ENCOUNTER (OUTPATIENT)
Facility: HOSPITAL | Age: 65
Discharge: HOME | End: 2025-04-14
Attending: UROLOGY | Admitting: UROLOGY
Payer: COMMERCIAL

## 2025-04-14 ENCOUNTER — APPOINTMENT (OUTPATIENT)
Dept: GENERAL RADIOLOGY | Facility: HOSPITAL | Age: 65
End: 2025-04-14
Attending: UROLOGY
Payer: COMMERCIAL

## 2025-04-14 VITALS
WEIGHT: 121.6 LBS | HEIGHT: 67 IN | TEMPERATURE: 97.7 F | HEART RATE: 58 BPM | OXYGEN SATURATION: 94 % | DIASTOLIC BLOOD PRESSURE: 67 MMHG | RESPIRATION RATE: 16 BRPM | BODY MASS INDEX: 19.09 KG/M2 | SYSTOLIC BLOOD PRESSURE: 123 MMHG

## 2025-04-14 DIAGNOSIS — C67.8 MALIGNANT NEOPLASM OF OVERLAPPING SITES OF BLADDER (H): Primary | ICD-10-CM

## 2025-04-14 DIAGNOSIS — C64.1 UROTHELIAL CARCINOMA OF KIDNEY, RIGHT (H): ICD-10-CM

## 2025-04-14 DIAGNOSIS — C67.8 MALIGNANT NEOPLASM OF OVERLAPPING SITES OF BLADDER (H): ICD-10-CM

## 2025-04-14 DIAGNOSIS — C68.9 UROTHELIAL CARCINOMA (H): ICD-10-CM

## 2025-04-14 DIAGNOSIS — Z85.51 PERSONAL HISTORY OF MALIGNANT NEOPLASM OF BLADDER: Primary | ICD-10-CM

## 2025-04-14 PROCEDURE — 250N000009 HC RX 250: Performed by: NURSE ANESTHETIST, CERTIFIED REGISTERED

## 2025-04-14 PROCEDURE — 250N000011 HC RX IP 250 OP 636: Performed by: UROLOGY

## 2025-04-14 PROCEDURE — 250N000013 HC RX MED GY IP 250 OP 250 PS 637: Performed by: UROLOGY

## 2025-04-14 PROCEDURE — 999N000141 HC STATISTIC PRE-PROCEDURE NURSING ASSESSMENT: Performed by: UROLOGY

## 2025-04-14 PROCEDURE — 250N000025 HC SEVOFLURANE, PER MIN: Performed by: UROLOGY

## 2025-04-14 PROCEDURE — 370N000017 HC ANESTHESIA TECHNICAL FEE, PER MIN: Performed by: UROLOGY

## 2025-04-14 PROCEDURE — 272N000001 HC OR GENERAL SUPPLY STERILE: Performed by: UROLOGY

## 2025-04-14 PROCEDURE — C1769 GUIDE WIRE: HCPCS | Performed by: UROLOGY

## 2025-04-14 PROCEDURE — 250N000011 HC RX IP 250 OP 636: Performed by: NURSE ANESTHETIST, CERTIFIED REGISTERED

## 2025-04-14 PROCEDURE — 258N000003 HC RX IP 258 OP 636: Performed by: NURSE ANESTHETIST, CERTIFIED REGISTERED

## 2025-04-14 PROCEDURE — 52354 CYSTOURETERO W/BIOPSY: CPT | Performed by: UROLOGY

## 2025-04-14 PROCEDURE — 52332 CYSTOSCOPY AND TREATMENT: CPT | Performed by: UROLOGY

## 2025-04-14 PROCEDURE — C2617 STENT, NON-COR, TEM W/O DEL: HCPCS | Performed by: UROLOGY

## 2025-04-14 PROCEDURE — 710N000010 HC RECOVERY PHASE 1, LEVEL 2, PER MIN: Performed by: UROLOGY

## 2025-04-14 PROCEDURE — C1758 CATHETER, URETERAL: HCPCS | Performed by: UROLOGY

## 2025-04-14 PROCEDURE — 360N000083 HC SURGERY LEVEL 3 W/ FLUORO, PER MIN: Performed by: UROLOGY

## 2025-04-14 PROCEDURE — 710N000012 HC RECOVERY PHASE 2, PER MINUTE: Performed by: UROLOGY

## 2025-04-14 PROCEDURE — 255N000002 HC RX 255 OP 636: Performed by: UROLOGY

## 2025-04-14 PROCEDURE — 88305 TISSUE EXAM BY PATHOLOGIST: CPT | Mod: 26 | Performed by: PATHOLOGY

## 2025-04-14 PROCEDURE — 999N000179 XR SURGERY CARM FLUORO LESS THAN 5 MIN W STILLS

## 2025-04-14 PROCEDURE — 88305 TISSUE EXAM BY PATHOLOGIST: CPT | Mod: TC | Performed by: UROLOGY

## 2025-04-14 DEVICE — STENT URETERAL SOF-CURL TECOFLEX 6FRX24CM SSC6024: Type: IMPLANTABLE DEVICE | Site: URETHRA | Status: FUNCTIONAL

## 2025-04-14 RX ORDER — PROPOFOL 10 MG/ML
INJECTION, EMULSION INTRAVENOUS PRN
Status: DISCONTINUED | OUTPATIENT
Start: 2025-04-14 | End: 2025-04-14

## 2025-04-14 RX ORDER — PROPOFOL 10 MG/ML
INJECTION, EMULSION INTRAVENOUS CONTINUOUS PRN
Status: DISCONTINUED | OUTPATIENT
Start: 2025-04-14 | End: 2025-04-14

## 2025-04-14 RX ORDER — HYDROMORPHONE HYDROCHLORIDE 1 MG/ML
0.4 INJECTION, SOLUTION INTRAMUSCULAR; INTRAVENOUS; SUBCUTANEOUS EVERY 5 MIN PRN
Status: DISCONTINUED | OUTPATIENT
Start: 2025-04-14 | End: 2025-04-14 | Stop reason: HOSPADM

## 2025-04-14 RX ORDER — NALOXONE HYDROCHLORIDE 0.4 MG/ML
0.1 INJECTION, SOLUTION INTRAMUSCULAR; INTRAVENOUS; SUBCUTANEOUS
Status: DISCONTINUED | OUTPATIENT
Start: 2025-04-14 | End: 2025-04-14 | Stop reason: HOSPADM

## 2025-04-14 RX ORDER — SCOPOLAMINE 1 MG/3D
1 PATCH, EXTENDED RELEASE TRANSDERMAL ONCE
Status: DISCONTINUED | OUTPATIENT
Start: 2025-04-14 | End: 2025-04-14 | Stop reason: HOSPADM

## 2025-04-14 RX ORDER — ACETAMINOPHEN 325 MG/1
650 TABLET ORAL ONCE
Status: DISCONTINUED | OUTPATIENT
Start: 2025-04-14 | End: 2025-04-14 | Stop reason: HOSPADM

## 2025-04-14 RX ORDER — HYDROMORPHONE HYDROCHLORIDE 1 MG/ML
0.2 INJECTION, SOLUTION INTRAMUSCULAR; INTRAVENOUS; SUBCUTANEOUS EVERY 5 MIN PRN
Status: DISCONTINUED | OUTPATIENT
Start: 2025-04-14 | End: 2025-04-14 | Stop reason: HOSPADM

## 2025-04-14 RX ORDER — ACETAMINOPHEN 325 MG/1
975 TABLET ORAL ONCE
Status: COMPLETED | OUTPATIENT
Start: 2025-04-14 | End: 2025-04-14

## 2025-04-14 RX ORDER — ONDANSETRON 2 MG/ML
4 INJECTION INTRAMUSCULAR; INTRAVENOUS EVERY 30 MIN PRN
Status: DISCONTINUED | OUTPATIENT
Start: 2025-04-14 | End: 2025-04-14 | Stop reason: HOSPADM

## 2025-04-14 RX ORDER — ONDANSETRON 4 MG/1
4 TABLET, ORALLY DISINTEGRATING ORAL EVERY 30 MIN PRN
Status: DISCONTINUED | OUTPATIENT
Start: 2025-04-14 | End: 2025-04-14 | Stop reason: HOSPADM

## 2025-04-14 RX ORDER — FENTANYL CITRATE 50 UG/ML
50 INJECTION, SOLUTION INTRAMUSCULAR; INTRAVENOUS EVERY 5 MIN PRN
Status: DISCONTINUED | OUTPATIENT
Start: 2025-04-14 | End: 2025-04-14 | Stop reason: HOSPADM

## 2025-04-14 RX ORDER — KETAMINE HYDROCHLORIDE 10 MG/ML
INJECTION INTRAMUSCULAR; INTRAVENOUS PRN
Status: DISCONTINUED | OUTPATIENT
Start: 2025-04-14 | End: 2025-04-14

## 2025-04-14 RX ORDER — CEFAZOLIN SODIUM/WATER 2 G/20 ML
2 SYRINGE (ML) INTRAVENOUS SEE ADMIN INSTRUCTIONS
Status: DISCONTINUED | OUTPATIENT
Start: 2025-04-14 | End: 2025-04-14 | Stop reason: HOSPADM

## 2025-04-14 RX ORDER — ONDANSETRON 2 MG/ML
INJECTION INTRAMUSCULAR; INTRAVENOUS PRN
Status: DISCONTINUED | OUTPATIENT
Start: 2025-04-14 | End: 2025-04-14

## 2025-04-14 RX ORDER — OXYCODONE HYDROCHLORIDE 5 MG/1
10 TABLET ORAL
Status: DISCONTINUED | OUTPATIENT
Start: 2025-04-14 | End: 2025-04-14 | Stop reason: HOSPADM

## 2025-04-14 RX ORDER — DEXAMETHASONE SODIUM PHOSPHATE 4 MG/ML
4 INJECTION, SOLUTION INTRA-ARTICULAR; INTRALESIONAL; INTRAMUSCULAR; INTRAVENOUS; SOFT TISSUE
Status: DISCONTINUED | OUTPATIENT
Start: 2025-04-14 | End: 2025-04-14 | Stop reason: HOSPADM

## 2025-04-14 RX ORDER — CEFAZOLIN SODIUM/WATER 2 G/20 ML
2 SYRINGE (ML) INTRAVENOUS
Status: COMPLETED | OUTPATIENT
Start: 2025-04-14 | End: 2025-04-14

## 2025-04-14 RX ORDER — ALBUTEROL SULFATE 0.83 MG/ML
2.5 SOLUTION RESPIRATORY (INHALATION) EVERY 4 HOURS PRN
Status: DISCONTINUED | OUTPATIENT
Start: 2025-04-14 | End: 2025-04-14 | Stop reason: HOSPADM

## 2025-04-14 RX ORDER — IOPAMIDOL 612 MG/ML
INJECTION, SOLUTION INTRAVASCULAR PRN
Status: DISCONTINUED | OUTPATIENT
Start: 2025-04-14 | End: 2025-04-14 | Stop reason: HOSPADM

## 2025-04-14 RX ORDER — ACETAMINOPHEN 650 MG/1
650 SUPPOSITORY RECTAL ONCE
Status: COMPLETED | OUTPATIENT
Start: 2025-04-14 | End: 2025-04-14

## 2025-04-14 RX ORDER — SODIUM CHLORIDE, SODIUM LACTATE, POTASSIUM CHLORIDE, CALCIUM CHLORIDE 600; 310; 30; 20 MG/100ML; MG/100ML; MG/100ML; MG/100ML
INJECTION, SOLUTION INTRAVENOUS CONTINUOUS
Status: DISCONTINUED | OUTPATIENT
Start: 2025-04-14 | End: 2025-04-14 | Stop reason: HOSPADM

## 2025-04-14 RX ORDER — FENTANYL CITRATE 50 UG/ML
25 INJECTION, SOLUTION INTRAMUSCULAR; INTRAVENOUS EVERY 5 MIN PRN
Status: DISCONTINUED | OUTPATIENT
Start: 2025-04-14 | End: 2025-04-14 | Stop reason: HOSPADM

## 2025-04-14 RX ORDER — APREPITANT 40 MG/1
40 CAPSULE ORAL ONCE
Status: COMPLETED | OUTPATIENT
Start: 2025-04-14 | End: 2025-04-14

## 2025-04-14 RX ORDER — LIDOCAINE 40 MG/G
CREAM TOPICAL
Status: DISCONTINUED | OUTPATIENT
Start: 2025-04-14 | End: 2025-04-14 | Stop reason: HOSPADM

## 2025-04-14 RX ORDER — OXYCODONE HYDROCHLORIDE 5 MG/1
5 TABLET ORAL
Status: DISCONTINUED | OUTPATIENT
Start: 2025-04-14 | End: 2025-04-14 | Stop reason: HOSPADM

## 2025-04-14 RX ORDER — LABETALOL HYDROCHLORIDE 5 MG/ML
10 INJECTION, SOLUTION INTRAVENOUS
Status: DISCONTINUED | OUTPATIENT
Start: 2025-04-14 | End: 2025-04-14 | Stop reason: HOSPADM

## 2025-04-14 RX ORDER — DEXAMETHASONE SODIUM PHOSPHATE 4 MG/ML
INJECTION, SOLUTION INTRA-ARTICULAR; INTRALESIONAL; INTRAMUSCULAR; INTRAVENOUS; SOFT TISSUE PRN
Status: DISCONTINUED | OUTPATIENT
Start: 2025-04-14 | End: 2025-04-14

## 2025-04-14 RX ORDER — IBUPROFEN 200 MG
600 TABLET ORAL ONCE
Status: DISCONTINUED | OUTPATIENT
Start: 2025-04-14 | End: 2025-04-14 | Stop reason: HOSPADM

## 2025-04-14 RX ORDER — EPHEDRINE SULFATE 50 MG/ML
INJECTION, SOLUTION INTRAMUSCULAR; INTRAVENOUS; SUBCUTANEOUS PRN
Status: DISCONTINUED | OUTPATIENT
Start: 2025-04-14 | End: 2025-04-14

## 2025-04-14 RX ORDER — LIDOCAINE HYDROCHLORIDE 20 MG/ML
INJECTION, SOLUTION INFILTRATION; PERINEURAL PRN
Status: DISCONTINUED | OUTPATIENT
Start: 2025-04-14 | End: 2025-04-14

## 2025-04-14 RX ORDER — DEXAMETHASONE SODIUM PHOSPHATE 10 MG/ML
4 INJECTION, SOLUTION INTRAMUSCULAR; INTRAVENOUS
Status: DISCONTINUED | OUTPATIENT
Start: 2025-04-14 | End: 2025-04-14 | Stop reason: HOSPADM

## 2025-04-14 RX ADMIN — DEXMEDETOMIDINE HYDROCHLORIDE 10 MCG: 100 INJECTION, SOLUTION INTRAVENOUS at 10:03

## 2025-04-14 RX ADMIN — Medication 10 MG: at 10:16

## 2025-04-14 RX ADMIN — LIDOCAINE HYDROCHLORIDE 40 MG: 20 INJECTION, SOLUTION INFILTRATION; PERINEURAL at 09:59

## 2025-04-14 RX ADMIN — Medication 2 G: at 09:49

## 2025-04-14 RX ADMIN — ONDANSETRON 4 MG: 2 INJECTION INTRAMUSCULAR; INTRAVENOUS at 09:54

## 2025-04-14 RX ADMIN — PROPOFOL 150 MG: 10 INJECTION, EMULSION INTRAVENOUS at 09:59

## 2025-04-14 RX ADMIN — Medication 30 MG: at 10:05

## 2025-04-14 RX ADMIN — MIDAZOLAM 1 MG: 1 INJECTION INTRAMUSCULAR; INTRAVENOUS at 09:54

## 2025-04-14 RX ADMIN — APREPITANT 40 MG: 40 CAPSULE ORAL at 08:52

## 2025-04-14 RX ADMIN — ACETAMINOPHEN 975 MG: 325 TABLET, FILM COATED ORAL at 08:32

## 2025-04-14 RX ADMIN — SODIUM CHLORIDE, SODIUM LACTATE, POTASSIUM CHLORIDE, AND CALCIUM CHLORIDE: .6; .31; .03; .02 INJECTION, SOLUTION INTRAVENOUS at 08:30

## 2025-04-14 RX ADMIN — MIDAZOLAM 1 MG: 1 INJECTION INTRAMUSCULAR; INTRAVENOUS at 10:09

## 2025-04-14 RX ADMIN — PROPOFOL 150 MCG/KG/MIN: 10 INJECTION, EMULSION INTRAVENOUS at 10:05

## 2025-04-14 RX ADMIN — DEXAMETHASONE SODIUM PHOSPHATE 10 MG: 4 INJECTION, SOLUTION INTRA-ARTICULAR; INTRALESIONAL; INTRAMUSCULAR; INTRAVENOUS; SOFT TISSUE at 10:20

## 2025-04-14 RX ADMIN — SCOPOLAMINE 1 PATCH: 1.5 PATCH, EXTENDED RELEASE TRANSDERMAL at 08:52

## 2025-04-14 ASSESSMENT — ACTIVITIES OF DAILY LIVING (ADL)
ADLS_ACUITY_SCORE: 18

## 2025-04-14 NOTE — ANESTHESIA PROCEDURE NOTES
Airway    Staff -        CRNA: Li Villegas APRN CRNA       Performed By: CRNAIndications and Patient Condition       Indications for airway management: sangita-procedural and airway protection       Induction type:intravenous       Mask difficulty assessment: 1 - vent by mask    Final Airway Details       Final airway type: supraglottic airway    Supraglottic Airway Details        Type: LMA       LMA size: 3    Post intubation assessment        Placement verified by: capnometry, equal breath sounds and chest rise        Number of attempts at approach: 1       Secured with: plastic tape       Ease of procedure: easy       Dentition: Intact and Unchanged

## 2025-04-14 NOTE — OR NURSING
Patient continues to do well.  Denies any pain; continues to have urinary urgency/frequency; patient up to bathroom to void.  Gait steady.  Patient was able to pass urine; small amount pink tinged.  Patient back to room.  IV removed.  Discharge instructions reviewed with patient and daughter.  Patient's daughter notes that she spoke to Dr. Leblanc and has no further questions; patient in agreement and does not wish to wait for provider at this time.

## 2025-04-14 NOTE — OR NURSING
Patient and responsible adult given discharge instructions with no questions regarding instructions. Javon score 20. Pain level 0/10.  Discharged from unit via ambulatory. Patient discharged to home with daughter.

## 2025-04-14 NOTE — ANESTHESIA POSTPROCEDURE EVALUATION
Patient: Kait Augustin    Procedure: Procedure(s):  CYSTOSCOPY, WITH BLADDER BIOPSY  CYSTOURETEROSCOPY, WITH RETROGRADE PYELOGRAM, LASER ABLATION AND STENT INSERTION       Anesthesia Type:  General    Note:  Disposition: Outpatient   Postop Pain Control: Uneventful            Sign Out: Well controlled pain   PONV: No   Neuro/Psych: Uneventful            Sign Out: Acceptable/Baseline neuro status   Airway/Respiratory: Uneventful            Sign Out: Acceptable/Baseline resp. status   CV/Hemodynamics: Uneventful            Sign Out: Acceptable CV status; No obvious hypovolemia; No obvious fluid overload   Other NRE: NONE   DID A NON-ROUTINE EVENT OCCUR? No           Last vitals:  Vitals Value Taken Time   /64 04/14/25 1145   Temp 97.7  F (36.5  C) 04/14/25 1145   Pulse 59 04/14/25 1147   Resp 5 04/14/25 1147   SpO2 95 % 04/14/25 1147   Vitals shown include unfiled device data.    Electronically Signed By: AISSATOU PERRY CRNA  April 14, 2025  12:36 PM

## 2025-04-14 NOTE — H&P
Patient seen in preop.  No changes since our office visit.    CT scan with no obvious filling defect in upper tracts. She does have history of upper tract urothelial tumors however so we are planning surveillance ureteroscopy with any indicated biopsies or fulguration.  If unable to access upper tract today, would plan for stent placement and delayed ureteroscopy.  Patient marked.  Proceed as planned.

## 2025-04-14 NOTE — ANESTHESIA CARE TRANSFER NOTE
Patient: Kait Augustin    Procedure: Procedure(s):  CYSTOSCOPY, WITH BLADDER BIOPSY  CYSTOURETEROSCOPY, WITH RETROGRADE PYELOGRAM, LASER ABLATION AND STENT INSERTION       Diagnosis: Personal history of malignant neoplasm of bladder [Z85.51]  Urothelial carcinoma (H) [C68.9]  Diagnosis Additional Information: No value filed.    Anesthesia Type:   General     Note:    Oropharynx: oropharynx clear of all foreign objects and spontaneously breathing  Level of Consciousness: drowsy  Oxygen Supplementation: nasal cannula  Level of Supplemental Oxygen (L/min / FiO2): 2  Independent Airway: airway patency satisfactory and stable  Dentition: dentition unchanged  Vital Signs Stable: post-procedure vital signs reviewed and stable  Report to RN Given: handoff report given  Patient transferred to: Phase II    Handoff Report: Identifed the Patient, Identified the Reponsible Provider, Reviewed the pertinent medical history, Discussed the surgical course, Reviewed Intra-OP anesthesia mangement and issues during anesthesia, Set expectations for post-procedure period and Allowed opportunity for questions and acknowledgement of understanding      Vitals:  Vitals Value Taken Time   BP     Temp     Pulse     Resp     SpO2         Electronically Signed By: AISSATOU Moreira CRNA  April 14, 2025  11:06 AM

## 2025-04-14 NOTE — DISCHARGE INSTRUCTIONS
Thank you for having your procedure with [facility].  Our nurse, Amber will be reaching out to you from the clinic to help make all of your necessary follow up arrangements.  If you are having pain that is out of proportion as is described in the surgery sheet provided to you by the clinic please reach out to  Amber at [phone].    FROM YOUR SURGEON:  You will likely have blood in the urine on and off with the stent in place.  You will likely have bladder urgency and frequency.  Call urology clinic if bleeding is severe or you cannot urinate or you develop a fever.  Seek evaluation in the ER if this happens after hours.      You will be scheduled for stent removal in the office in about 10 days.    After Anesthesia (Sleep Medicine)  What should I do after anesthesia?  You should rest and relax for the next 24 hours. Avoid risky or difficult (strenuous) activity. A responsible adult should stay with you overnight.  Don't drive or use any heavy equipment for 24 hours. Even if you feel normal, your reactions may be affected by the sleep medicine given to you.  Don't drink alcohol or make any important decisions for 24 hours.  Slowly get back to your regular diet, as you feel able.  How should I expect to feel?  It's normal to feel dizzy, light-headed, or faint for up to a full day after anesthesia or while taking pain medicine. If this happens:   Sit down for a few minutes before standing.  Have someone help you when you get up to walk or use the bathroom.  If you have nausea (feel sick to your stomach) or vomit (throw up):   Drink clear liquids (such as apple juice, ginger ale, broth, or 7UP) until you feel better.  If you feel sick to your stomach, or you keep vomiting for 24 hours, please call the doctor.  What else should I know?  You might have a dry mouth, sore throat, muscle aches, or trouble sleeping. These should go away after 24 hours.  Please contact your doctor if you have any other symptoms  that concern you, such as fever, pain, bleeding, fluid drainage, swelling, or headache, or if it's been over 8 to 10 hours and you still aren't able to pee (urinate).  If you have a history of sleep apnea, it's very important to use your CPAP machine for the next 24 hours when you nap or sleep.   For informational purposes only. Not to replace the advice of your health care provider. Copyright   2023 [facility]. All rights reserved. Clinically reviewed by Pancho Veliz MD. [facility] [id number] - REV 09/23.  Ureteroscopy: What to Expect at Home  Your Recovery  Most people are able to go home the same day of the procedure. But you may need to stay in the hospital. If you do, the stay is usually no more than 24 to 48 hours.  For several hours after the procedure you may have a burning feeling when you urinate. This feeling should go away within a day. Drinking a lot of water can help. Your doctor also may advise you to take medicine that numbs the burning. This medicine is called phenazopyridine. It is available by prescription and over the counter. Brand names include Pyridium and Uristat.  You may have some blood in your urine for 2 or 3 days.  Your doctor may prescribe an antibiotic for a day or two. This will help prevent an infection.  This care sheet gives you a general idea about how long it will take for you to recover. But each person recovers at a different pace. Follow the steps below to feel better as quickly as possible.  How can you care for yourself at home?  Activity    You can go back to work and other activities the next day.   Diet    Try to drink two 8-ounce glasses of water each hour for 2 hours after the procedure. This may help ease the burning when you urinate.   Medicines    Your doctor will tell you if and when you can restart your medicines. He or she will also give you instructions about taking any new medicines.     If you stopped taking aspirin or some other blood thinner,  your doctor will tell you when to start taking it again.     If you take medicine to stop the burning when you urinate, take it exactly as recommended. Be safe with medicines. Call your doctor if you think you are having a problem with your medicine. You will get more details on the specific medicine your doctor recommends.     If your doctor prescribed antibiotics, take them as directed. Do not stop taking them just because you feel better. You need to take the full course of antibiotics.     Ask your doctor if you can take an over-the-counter pain medicine, such as acetaminophen (Tylenol), ibuprofen (Advil, Motrin), or naproxen (Aleve). Read and follow all instructions on the label. Do not take two or more pain medicines at the same time unless the doctor told you to. Many pain medicines have acetaminophen, which is Tylenol. Too much acetaminophen (Tylenol) can be harmful.   Heat    Take a warm bath. This may soothe the burning.     You also can hold a warm washcloth over your urethra for comfort. (The urethra is where your urine comes out.)   Follow-up care is a key part of your treatment and safety. Be sure to make and go to all appointments, and call your doctor if you are having problems. It's also a good idea to know your test results and keep a list of the medicines you take.  When should you call for help?   Call 911 anytime you think you may need emergency care. For example, call if:    You passed out (lost consciousness).     You have chest pain, are short of breath, or cough up blood.   Call your doctor now or seek immediate medical care if:    You have pain that does not get better after you take pain medicine.     You have new or more blood clots in your urine. (It is normal for the urine to be pink for a few days.)     You cannot urinate.     You have symptoms of a urinary tract infection. These may include:  Pain or burning when you urinate.  A frequent need to urinate without being able to pass much  "urine.  Pain in the flank, which is just below the rib cage and above the waist on either side of the back.  Blood in the urine.  A fever.     You are sick to your stomach or cannot drink fluids.     You have signs of a blood clot in your leg (called a deep vein thrombosis), such as:  Pain in the calf, back of the knee, thigh, or groin.  Redness and swelling in your leg.   Watch closely for changes in your health, and be sure to contact your doctor if you are having any problems.  Where can you learn more?  Go to https://www.[email or web address].net/patiented  Enter P672 in the search box to learn more about \"Ureteroscopy: What to Expect at Home.\"  Current as of: April 30, 2024  Content Version: 14.4    6284-0337 [employer].   Care instructions adapted under license by your healthcare professional. If you have questions about a medical condition or this instruction, always ask your healthcare professional. [employer] disclaims any warranty or liability for your use of this information.  Ureteral Stent Placement: What to Expect at Home  Your Recovery     A ureteral (say \"you-REE-ter-ul\") stent is a thin, hollow tube that is placed in the ureter to help urine pass from the kidney into the bladder. Ureters are the tubes that connect the kidneys to the bladder.  You may have a small amount of blood in your urine for 1 to 3 days after the procedure.  While the stent is in place, you may have to urinate more often, feel a sudden need to urinate, or feel like you can't completely empty your bladder. You may feel some pain when you urinate or do strenuous activity. You also may notice a small amount of blood in your urine after strenuous activities. These side effects usually don't prevent people from doing their normal daily activities.  You may have a thin string coming out of your urethra. Your urethra is the tube that carries urine from your bladder to outside your body. This string is attached to the " stent. Try not to pull on the string. It will be used to pull out the stent when you no longer need it.  After the procedure, urine may flow better from your kidneys to your bladder. A ureteral stent may be left in place for several days or for as long as several months. Your doctor will take it out when you no longer need it. Or, in some cases, it may be taken out at home.  This care sheet gives you a general idea about how long it will take for you to recover. But each person recovers at a different pace. Follow the steps below to get better as quickly as possible.  How can you care for yourself at home?  Activity    Rest when you feel tired. Getting enough sleep will help you recover.     Avoid strenuous activities, such as bicycle riding, jogging, weight lifting, or aerobic exercise, until your doctor says it is okay.     Ask your doctor when you can drive again.     Most people are able to return to work the day after the procedure. If your work requires intense activity, you may feel pain in your kidney area or get tired easily. If this happens, you may need to do less strenuous activities while the stent is in.     Ask your doctor when it is okay for you to have sex.   Diet    You can eat your normal diet. If your stomach is upset, try bland, low-fat foods like plain rice, broiled chicken, toast, and yogurt.     Drink plenty of fluids (unless your doctor tells you not to).   Medicines    Your doctor will tell you if and when you can restart your medicines. You will also get instructions about taking any new medicines.     If you stopped taking aspirin or some other blood thinner, your doctor will tell you when to start taking it again.     Be safe with medicines. Take pain medicines exactly as directed.  If the doctor gave you a prescription medicine for pain, take it as prescribed.  If you are not taking a prescription pain medicine, ask your doctor if you can take an over-the-counter medicine.     If you  think your pain medicine is making you sick to your stomach:  Take your medicine after meals (unless your doctor has told you not to).  Ask your doctor for a different pain medicine.     If your doctor prescribed antibiotics, take them as directed. Do not stop taking them just because you feel better. You need to take the full course of antibiotics.   Follow-up care is a key part of your treatment and safety. Be sure to make and go to all appointments, and call your doctor if you are having problems. It's also a good idea to know your test results and keep a list of the medicines you take.  When should you call for help?   Call 911 anytime you think you may need emergency care. For example, call if:    You passed out (lost consciousness).     You have severe trouble breathing.     You have sudden chest pain and shortness of breath, or you cough up blood.     You have severe belly pain.   Call your doctor now or seek immediate medical care if:    Part or all of the stent comes out of your urethra.     You have pain that does not get better after you take pain medicine.     You have symptoms of a urinary infection. For example:  You have blood or pus in your urine.  You have pain in your back just below your rib cage. This is called flank pain.  You have a fever, chills, or body aches.  It hurts to urinate.  You have groin or belly pain.     You cannot control when you urinate, or you leak urine.   Watch closely for changes in your health, and be sure to contact your doctor if you have any problems.  Current as of: April 30, 2024  Content Version: 14.4    8322-8649 [employer].   Care instructions adapted under license by your healthcare professional. If you have questions about a medical condition or this instruction, always ask your healthcare professional. [employer] disclaims any warranty or liability for your use of this information.

## 2025-04-14 NOTE — OP NOTE
[facility] Operative Note    Pre-operative diagnosis: Personal history of malignant neoplasm of bladder [Z85.51]  Urothelial carcinoma (H) [C68.9]   Post-operative diagnosis right renal pelvis tumor   Procedure: CYSTOSCOPY, WITH BLADDER BIOPSY  CYSTOURETEROSCOPY, WITH RETROGRADE PYELOGRAM, LASER ABLATION AND STENT INSERTION - Right   Surgeon: Dulce Maria Leblanc MD   Assistants(s): None   Estimated blood loss: minimal    Specimens: Bladder biopsy   Findings: Papillary mucosa at bladder dome biopsied, base fulgurated  Right sided retrograde pyelogram unremarkable  Right sided ureteroscopy revealing 1 cm diameter patch of papillary tumor at entrance to upper pole, ablated with laser  6 Macedonian by 24 cm stent placed     Indication for procedure: The patient is a 64-year-old woman with history of recurrent bladder cancer and urothelial carcinoma of the right upper tract.  She recently underwent a surveillance cystoscopy in the office showing papillary change and erythema at the bladder dome.  The CT urogram showed no obvious upper tract disease however the patient has not had surveillance in some time.  We discussed proceeding to the operating room for bladder biopsy and surveillance ureteroscopy.    Procedure in detail: The patient was identified and greeted in the preoperative holding area.  She was marked and consented for the procedure.  She was brought to the operating room and placed supine on the OR table.  General anesthesia was induced.  She was moved to a dorsal lithotomy position and prepped and draped in the standard fashion.  A surgical timeout was performed.  The 22 Macedonian rigid cystoscope was inserted through the urethra and into the bladder.  Upon entering the bladder, it was surveyed in 360 degrees using the 30 degree lens.  There were multiple areas of scarring.  Both ureteral orifice ease were orthotopic but had been previously resected.  At the dome of the bladder, there were multiple areas  of low papillary change.  There was some mild surrounding erythema.  The cold cup biopsy was used to take some small biopsy samples, and then the area fulgurated using Bugbee electrocautery.  It was hemostatic.  Then, a 5 Belgian open-ended ureteral catheter was inserted.  It was advanced into the right ureteral orifice.  Omnipaque contrast was instilled to perform the retrograde pyelogram.  The ureter was normal in its course and caliber without any evidence of filling defect or irregularity.  The kidney was similarly normal with sharp calyces and no obvious filling defects.  A Glidewire was placed through the ureteral catheter and seen to coil on fluoroscopy in the upper pole.  The flexible digital ureteroscope was advanced over this wire and up into the kidney.  Upon entering the kidney, it was carefully inspected under fluoroscopic guidance.  Each calyx was able to be entered except for a mid renal calyx and a lower pole calyx which were somewhat stenotic, but were able to be visualized.  At the entrance to the upper pole, there was a patch of papillary tumor measuring 1 to 1.5 cm in diameter.  There was no surrounding mucosal abnormality.  The 365  m fiber for the thulium laser was inserted.  Using the tissue ablation settings, the tumor was treated.  There was no visible tumor remaining.  The entire ureter was inspected as the ureteroscope was withdrawn.  There was no abnormality.  The wire was backloaded into the cystoscope, and then a 6 Belgian by 24 cm stent was placed over the wire.  The proximal coil was in good position on fluoroscopy in the renal pelvis and the distal coil was visibly in good position within the bladder.  The previously biopsied bladder dome site was again visualized and was hemostatic.  The bladder was emptied and the cystoscope removed.  The patient was awakened and taken to recovery in stable condition.    Plan: Return to clinic in about 7 to 10 days for cystoscopy with stent removal.   Will discuss bladder biopsy pathology at that time.  Plan for surveillance ureteroscopy in about 3 months.

## 2025-04-16 LAB
PATH REPORT.COMMENTS IMP SPEC: NORMAL
PATH REPORT.FINAL DX SPEC: NORMAL
PATH REPORT.GROSS SPEC: NORMAL
PATH REPORT.MICROSCOPIC SPEC OTHER STN: NORMAL
PATH REPORT.RELEVANT HX SPEC: NORMAL
PHOTO IMAGE: NORMAL

## 2025-04-22 ENCOUNTER — PREP FOR PROCEDURE (OUTPATIENT)
Dept: UROLOGY | Facility: OTHER | Age: 65
End: 2025-04-22

## 2025-04-22 DIAGNOSIS — C64.1 UROTHELIAL CARCINOMA OF KIDNEY, RIGHT (H): Primary | ICD-10-CM

## 2025-04-22 NOTE — PROGRESS NOTES
R sided cystoureteroscopy on right side performed by Dr. Leblanc on 7/15/25  Pt will schedule with primary for pre-op a week prior  Pre-op education provided     Thank you,     Amber BARBER BSN, PHN  RN Care Coordinator Urology  [facility]

## 2025-04-24 ENCOUNTER — OFFICE VISIT (OUTPATIENT)
Dept: UROLOGY | Facility: OTHER | Age: 65
End: 2025-04-24
Attending: UROLOGY
Payer: COMMERCIAL

## 2025-04-24 ENCOUNTER — LAB (OUTPATIENT)
Dept: LAB | Facility: OTHER | Age: 65
End: 2025-04-24
Attending: UROLOGY
Payer: COMMERCIAL

## 2025-04-24 VITALS
DIASTOLIC BLOOD PRESSURE: 73 MMHG | OXYGEN SATURATION: 97 % | BODY MASS INDEX: 19.1 KG/M2 | HEART RATE: 83 BPM | HEIGHT: 67 IN | RESPIRATION RATE: 16 BRPM | SYSTOLIC BLOOD PRESSURE: 107 MMHG | WEIGHT: 121.69 LBS

## 2025-04-24 DIAGNOSIS — C67.8 MALIGNANT NEOPLASM OF OVERLAPPING SITES OF BLADDER (H): ICD-10-CM

## 2025-04-24 DIAGNOSIS — Z85.51 PERSONAL HISTORY OF MALIGNANT NEOPLASM OF BLADDER: ICD-10-CM

## 2025-04-24 DIAGNOSIS — C64.1 UROTHELIAL CARCINOMA OF KIDNEY, RIGHT (H): ICD-10-CM

## 2025-04-24 DIAGNOSIS — C68.9 UROTHELIAL CARCINOMA (H): ICD-10-CM

## 2025-04-24 DIAGNOSIS — C64.1 UROTHELIAL CARCINOMA OF KIDNEY, RIGHT (H): Primary | ICD-10-CM

## 2025-04-24 LAB
ALBUMIN UR-MCNC: 50 MG/DL
APPEARANCE UR: ABNORMAL
BILIRUB UR QL STRIP: NEGATIVE
COLOR UR AUTO: YELLOW
GLUCOSE UR STRIP-MCNC: NEGATIVE MG/DL
HGB UR QL STRIP: ABNORMAL
KETONES UR STRIP-MCNC: NEGATIVE MG/DL
LEUKOCYTE ESTERASE UR QL STRIP: ABNORMAL
MUCOUS THREADS #/AREA URNS LPF: PRESENT /LPF
NITRATE UR QL: NEGATIVE
PH UR STRIP: 7 [PH] (ref 4.7–8)
RBC URINE: >182 /HPF
SP GR UR STRIP: 1.02 (ref 1–1.03)
SQUAMOUS EPITHELIAL: 1 /HPF
UROBILINOGEN UR STRIP-MCNC: NORMAL MG/DL
WBC URINE: 46 /HPF

## 2025-04-24 PROCEDURE — 81001 URINALYSIS AUTO W/SCOPE: CPT

## 2025-04-24 PROCEDURE — 87086 URINE CULTURE/COLONY COUNT: CPT

## 2025-04-24 RX ORDER — SULFAMETHOXAZOLE AND TRIMETHOPRIM 800; 160 MG/1; MG/1
1 TABLET ORAL ONCE
Status: COMPLETED | OUTPATIENT
Start: 2025-04-24 | End: 2025-04-24

## 2025-04-24 RX ORDER — NITROFURANTOIN 25; 75 MG/1; MG/1
100 CAPSULE ORAL 2 TIMES DAILY
COMMUNITY
Start: 2024-09-06

## 2025-04-24 RX ORDER — DIAZEPAM 10 MG/1
TABLET ORAL
COMMUNITY
Start: 2025-03-03

## 2025-04-24 RX ADMIN — SULFAMETHOXAZOLE AND TRIMETHOPRIM 1 TABLET: 800; 160 TABLET ORAL at 14:15

## 2025-04-24 ASSESSMENT — PAIN SCALES - GENERAL: PAINLEVEL_OUTOF10: NO PAIN (0)

## 2025-04-24 NOTE — PROGRESS NOTES
HPI:    Kait Augustin is a 64 year old woman presenting today in followup for her bladder cancer and upper tract urothelial cancer.    She was initially diagnosed with bladder cancer in January 2015.      2/10/15 initial resection included right ureteral orifice and a stent was placed.  Ta low grade.  12/8/16 TURBT with mitomycin, Ta low grade  5/7/19 recurrence near right UO resected, also holmium laser ablation of tumor in right renal pelvis - low grade Ta  5/28/19 laser ablation of lower pole right upper tract tumor - high grade Ta  6/2019 induction BCG  8/4/2020 TURBT and laser ablation of right upper tract tumor - high grade Ta  3/9/2021 TURBT and laser ablation of right upper tract tumor   Referred to [facility] for Jelmyto upper tract (?pt refused or wasn't covered)  11/2/21 TURBT - high grade Ta    She was seen in the office to establish care on 3/24/2025.  Her cystoscopy showed some slight papillary appearing mucosa near the dome of the bladder.  I recommended biopsy.  She also had a CT urogram after that visit that was unremarkable.  She was brought to the operating room on 4/14/2025 for bladder biopsy and diagnostic/surveillance right sided ureteroscopy.  She was noted at that time to have a papillary recurrence, which was ablated with laser.    Today, 4/24/2025, she returns for cystoscopy with ureteral stent removal.  She has had typical stent related symptoms.    ROS:    Pertinent positives and negatives in HPI.    Patient Active Problem List   Diagnosis    H/O renal calculi    Malignant neoplasm of overlapping sites of bladder (H)    ACP (advance care planning)    Urothelial carcinoma of kidney, right (H)    History of bladder cancer    Bladder tumor    Recurrent malignant neoplasm of bladder (H)         Past Medical History:   Diagnosis Date    Cancer (H)     Bladder Cancer    H/O renal calculi 11/17/2014    MIGRAINE HEADACHES 1996    Nicotine dependence 11/17/2014    PONV (postoperative nausea and  vomiting)     Tobacco abuse 11/17/2014       Past Surgical History:   Procedure Laterality Date    COMBINED CYSTOSCOPY, RETROGRADES, URETEROSCOPY, INSERT STENT Right 4/14/2025    Procedure: CYSTOURETEROSCOPY, WITH RETROGRADE PYELOGRAM, LASER ABLATION AND STENT INSERTION;  Surgeon: Dulce Maria Leblanc MD;  Location: [facility] OR    CYSTOSCOPY, BIOPSY BLADDER, COMBINED N/A 4/14/2025    Procedure: CYSTOSCOPY, WITH BLADDER BIOPSY;  Surgeon: Dulce Maria Leblanc MD;  Location: [facility] OR    CYSTOSCOPY, RETROGRADES, INSERT STENT URETER(S), COMBINED Right 12/8/2015    Procedure: COMBINED CYSTOSCOPY, RETROGRADES, INSERT STENT URETER(S);  Surgeon: Efren Couch MD;  Location: [facility] OR    CYSTOSCOPY, RETROGRADES, INSERT STENT URETER(S), COMBINED Bilateral 8/4/2020    Procedure: bilateral retrograde pyelogram;  Surgeon: Delfino Walker MD;  Location: [facility] OR    CYSTOSCOPY, TRANSURETHRAL RESECTION (TUR) PROSTATE, COMBINED Bilateral 5/7/2019    Procedure: Transurethral Resection of Bladder Tumor, BILATERAL RETROGRADE PYELOGRAMS, RIGHT URETEROSCOPY WITH HOLMIUM LASER TUMOR ABLATION AND STENT PLACEMENT;  Surgeon: Delfino Walker MD;  Location: [facility] OR    CYSTOSCOPY, TRANSURETHRAL RESECTION (TUR) TUMOR BLADDER INSTILL CHEMOTHERAPY, COMBINED N/A 8/4/2020    Procedure: Transurethral resection of bladder tumor;  Surgeon: Delfino Walker MD;  Location: [facility] OR    CYSTOSCOPY, TRANSURETHRAL RESECTION (TUR) TUMOR BLADDER INSTILL CHEMOTHERAPY, COMBINED Bilateral 3/9/2021    Procedure: Transurethral resection of bladder tumor, bilateral retrograde pyelograms and gemcitabine instillation;  Surgeon: Delfino Walker MD;  Location: [facility]    CYSTOSCOPY, TRANSURETHRAL RESECTION (TUR) TUMOR BLADDER, COMBINED N/A 2/10/2015    Procedure: COMBINED CYSTOSCOPY, TRANSURETHRAL RESECTION (TUR) TUMOR BLADDER;  Surgeon: Efren Couch MD;  Location: [facility]    CYSTOSCOPY, TRANSURETHRAL RESECTION (TUR) TUMOR BLADDER, COMBINED N/A 12/8/2015    Procedure:  COMBINED CYSTOSCOPY, TRANSURETHRAL RESECTION (TUR) TUMOR BLADDER;  Surgeon: Efren Couch MD;  Location: [facility]    CYSTOSCOPY, TRANSURETHRAL RESECTION (TUR) TUMOR BLADDER, COMBINED Bilateral 11/2/2021    Procedure: Transurethral resection of bladder tumor and bilateral retrograde pyelograms with left stent placement;  Surgeon: Delfino Walker MD;  Location: [facility] OR    CYSTOSCOPY, URETEROSCOPY, COMBINED Right 5/28/2019    Procedure: Right Ureteroscopy, Fulgaration if Right iUpper Tract Urothelial Carcinoma;  Surgeon: Delfino Walker MD;  Location: [facility] OR    CYSTOSCOPY, URETEROSCOPY, COMBINED Right 8/4/2020    Procedure: diagnostic ureteroscopy,ablation of urothelial tumor, possible fulguration through flexible ureteroscope;  Surgeon: Delfino Walker MD;  Location:  OR    ZZC LIGATE FALLOPIAN TUBE  1996       Family History   Problem Relation Age of Onset    C.A.D. Father         dx late 60's, CABG, angioplasty    Cancer Father         Lung, dx age 80 h/o tobacco use    Heart Disease Mother     Thyroid Disease Mother     Lipids Sister         Social History     Tobacco Use    Smoking status: Every Day     Current packs/day: 0.50     Average packs/day: 0.5 packs/day for 45.0 years (22.5 ttl pk-yrs)     Types: Cigarettes    Smokeless tobacco: Never   Vaping Use    Vaping status: Never Used   Substance Use Topics    Alcohol use: Yes     Comment: rare    Drug use: Yes     Types: Marijuana        Current Outpatient Medications   Medication Sig Dispense Refill    diazepam (VALIUM) 10 MG tablet TAKE 1 TABLET BY MOUTH BEFORE GOING TO BEDTIME. TAKE 1 TABLET BY MOUTH 1 HOUR BEFORE SCHEDULED APPOINTMENT. HAVE SOMEONE DRIVE YOU TO AND FROM (Patient not taking: Reported on 4/24/2025)      [START ON 5/23/2025] nicotine (NICODERM CQ) 14 MG/24HR 24 hr patch Place 1 patch over 24 hours onto the skin every 24 hours for 14 days. (Patient not taking: Reported on 4/24/2025) 14 patch 0    nicotine (NICODERM CQ) 21 MG/24HR 24 hr  "patch Place 1 patch over 24 hours onto the skin every 24 hours. (Patient not taking: Reported on 4/24/2025) 42 patch 0    [START ON 6/7/2025] nicotine (NICODERM CQ) 7 MG/24HR 24 hr patch Place 1 patch over 24 hours onto the skin every 24 hours for 14 days. (Patient not taking: Reported on 4/24/2025) 14 patch 0    nitroFURantoin macrocrystal-monohydrate (MACROBID) 100 MG capsule Take 100 mg by mouth 2 times daily. (Patient not taking: Reported on 4/24/2025)       No current facility-administered medications for this visit.       Exam:  /73 (BP Location: Right arm, Patient Position: Sitting, Cuff Size: Adult Regular)   Pulse 83   Resp 16   Ht 1.702 m (5' 7.01\")   Wt 55.2 kg (121 lb 11.1 oz)   LMP 06/18/2005   SpO2 97%   BMI 19.06 kg/m      Procedure: flexible cystourethroscopy with ureteral stent removal    Indication: indwelling stent following ureteroscopy    Antibiotic: bactrim ds    Timeout: Yes    Procedure in Detail:  Consent was obtained for the procedure.  The patient was positioned and prepped with betadine.  Sterile drapes were placed.  A lubricated flexible cystoscope was introduced per urethra.  The urethra was normal.  Upon entering the bladder, the indwelling right ureteral stent was visualized.  It was grasped with a flexible grasping forceps and removed intact as the scope was withdrawn.  The patient tolerated the procedure well.          Assessment and Plan:    Kait Augustin is a 64 year old woman seen today for the following:    Urothelial carcinoma of kidney, right (H)    She is status post laser ablation of a tumor recurrence in her right renal pelvis.  Stent was removed today.  Plan for surveillance ureteroscopy with any indicated tumor treatment in 3 months.  "

## 2025-04-26 LAB — BACTERIA UR CULT: NORMAL

## 2025-06-02 ENCOUNTER — PATIENT OUTREACH (OUTPATIENT)
Dept: CARE COORDINATION | Facility: CLINIC | Age: 65
End: 2025-06-02
Payer: COMMERCIAL

## 2025-06-21 ENCOUNTER — HEALTH MAINTENANCE LETTER (OUTPATIENT)
Age: 65
End: 2025-06-21

## 2025-07-01 ENCOUNTER — TELEPHONE (OUTPATIENT)
Dept: FAMILY MEDICINE | Facility: OTHER | Age: 65
End: 2025-07-01

## 2025-07-01 NOTE — TELEPHONE ENCOUNTER
9:01 AM    Reason for Call: OVERBOOK / Pre-Op    Patient called to schedule: Pre-Op / 7/15/25 / Cysto-scope - going up into her kidneys / [place] [facility] / Dr. Leblanc   No openings available with PCP prior to Procedure date, please call patient to schedule    The patient is requesting an appointment for 2 weeks prior to 7/15/25 Procedure with PCP.    Was an appointment offered for this call? No - unable  If yes : Appointment type              Date    Preferred method for responding to this message: Telephone Call  What is your phone number ? [phone]    If we cannot reach you directly, may we leave a detailed response at the number you provided? Yes    Can this message wait until your PCP/provider returns, if unavailable today? Maryuri Chan

## 2025-07-08 ENCOUNTER — OFFICE VISIT (OUTPATIENT)
Dept: FAMILY MEDICINE | Facility: OTHER | Age: 65
End: 2025-07-08
Attending: FAMILY MEDICINE
Payer: MEDICARE

## 2025-07-08 VITALS
RESPIRATION RATE: 16 BRPM | HEIGHT: 67 IN | WEIGHT: 124.4 LBS | TEMPERATURE: 98.5 F | DIASTOLIC BLOOD PRESSURE: 68 MMHG | OXYGEN SATURATION: 96 % | SYSTOLIC BLOOD PRESSURE: 110 MMHG | BODY MASS INDEX: 19.53 KG/M2 | HEART RATE: 79 BPM

## 2025-07-08 DIAGNOSIS — Z01.818 PRE-OPERATIVE GENERAL PHYSICAL EXAMINATION: Primary | ICD-10-CM

## 2025-07-08 DIAGNOSIS — C64.1 UROTHELIAL CARCINOMA OF KIDNEY, RIGHT (H): ICD-10-CM

## 2025-07-08 DIAGNOSIS — C67.8 MALIGNANT NEOPLASM OF OVERLAPPING SITES OF BLADDER (H): ICD-10-CM

## 2025-07-08 PROBLEM — D49.4 BLADDER TUMOR: Status: ACTIVE | Noted: 2020-07-23

## 2025-07-08 LAB
ERYTHROCYTE [DISTWIDTH] IN BLOOD BY AUTOMATED COUNT: 12.5 % (ref 10–15)
HCT VFR BLD AUTO: 39.3 % (ref 35–47)
HGB BLD-MCNC: 13.5 G/DL (ref 11.7–15.7)
HOLD SPECIMEN: NORMAL
MCH RBC QN AUTO: 31.9 PG (ref 26.5–33)
MCHC RBC AUTO-ENTMCNC: 34.4 G/DL (ref 31.5–36.5)
MCV RBC AUTO: 93 FL (ref 78–100)
PLATELET # BLD AUTO: 244 10E3/UL (ref 150–450)
RBC # BLD AUTO: 4.23 10E6/UL (ref 3.8–5.2)
WBC # BLD AUTO: 7.1 10E3/UL (ref 4–11)

## 2025-07-08 PROCEDURE — G0463 HOSPITAL OUTPT CLINIC VISIT: HCPCS

## 2025-07-08 PROCEDURE — 36415 COLL VENOUS BLD VENIPUNCTURE: CPT | Mod: ZL | Performed by: FAMILY MEDICINE

## 2025-07-08 PROCEDURE — 85041 AUTOMATED RBC COUNT: CPT | Mod: ZL | Performed by: FAMILY MEDICINE

## 2025-07-08 ASSESSMENT — PAIN SCALES - GENERAL: PAINLEVEL_OUTOF10: NO PAIN (0)

## 2025-07-08 NOTE — PROGRESS NOTES
Preoperative Evaluation  [facility]  [address]  [address]  [place] [address]  Phone: [phone]  Primary Provider: Itzel Phillip CNP  Pre-op Performing Provider: Swati Oneal MD  Jul 8, 2025 7/8/2025   Surgical Information   What procedure is being done? Cystoureteroscopy   Facility or Hospital where procedure/surgery will be performed: [facility]   Who is doing the procedure / surgery? Dr. Dulce Maria Leblanc   Date of surgery / procedure: July 15, 2025   Time of surgery / procedure: TBD   Where do you plan to recover after surgery? at home with family     Fax number for surgical facility: Note does not need to be faxed, will be available electronically in Epic.    Assessment & Plan     The proposed surgical procedure is considered INTERMEDIATE risk.      ICD-10-CM    1. Pre-operative general physical examination  Z01.818 CBC with platelets     CBC with platelets      2. Urothelial carcinoma of kidney, right (H)  C64.1       3. Malignant neoplasm of overlapping sites of bladder (H)  C67.8              - No identified additional risk factors other than previously addressed    Antiplatelet or Anticoagulation Medication Instructions  Hold all ASA/NSAIDs/Vitamins/Supplements 7-10 days prior to procedure     Additional Medication Instructions  We reviewed the medication list and there are no chronic medications that need to be adjusted for this procedure.    Recommendation  Approval given to proceed with proposed procedure, without further diagnostic evaluation.    The longitudinal plan of care for the diagnosis(es)/condition(s) as documented were addressed during this visit. Due to the added complexity in care, I will continue to support Bernarda in the subsequent management and with ongoing continuity of care.    Follow-up  Return if symptoms worsen or fail to improve.    Uyen Menchaca is a 65 year old, presenting for the following:  Pre-Op Exam  (Cystoureteroscopy)          7/8/2025     3:02 PM   Additional Questions   Roomed by Vinnie Nguyen LPN   Accompanied by Self         7/8/2025     3:02 PM   Patient Reported Additional Medications   Patient reports taking the following new medications None     HPI: Bladder and kidney cancer          7/8/2025   Pre-Op Questionnaire   Have you ever had a heart attack or stroke? No   Have you ever had surgery on your heart or blood vessels, such as a stent placement, a coronary artery bypass, or surgery on an artery in your head, neck, heart, or legs? No   Do you have chest pain with activity? No   Do you have a history of heart failure? No   Do you currently have a cold, bronchitis or symptoms of other infection? No   Do you have a cough, shortness of breath, or wheezing? No   Do you or anyone in your family have previous history of blood clots? No   Do you or does anyone in your family have a serious bleeding problem such as prolonged bleeding following surgeries or cuts? No   Have you ever had problems with anemia or been told to take iron pills? No   Have you had any abnormal blood loss such as black, tarry or bloody stools, or abnormal vaginal bleeding? No   Have you ever had a blood transfusion? No   Are you willing to have a blood transfusion if it is medically needed before, during, or after your surgery? Yes   Have you or any of your relatives ever had problems with anesthesia? No   Do you have sleep apnea, excessive snoring or daytime drowsiness? No   Do you have any artifical heart valves or other implanted medical devices like a pacemaker, defibrillator, or continuous glucose monitor? No   Do you have artificial joints? No   Are you allergic to latex? No     Advance Care Planning    Discussed advance care planning with patient; however, patient declined at this time.    Preoperative Review of    reviewed - no record of controlled substances prescribed.      Status of Chronic Conditions:  See problem  list for active medical problems.  Problems all longstanding and stable, except as noted/documented.  See ROS for pertinent symptoms related to these conditions.    Patient Active Problem List    Diagnosis Date Noted    Recurrent malignant neoplasm of bladder (H) 02/22/2021     Priority: Medium    Bladder tumor 07/23/2020     Priority: Medium    Urothelial carcinoma of kidney, right (H) 06/03/2019     Priority: Medium    History of bladder cancer 06/03/2019     Priority: Medium    ACP (advance care planning) 12/13/2016     Priority: Medium     Advance Care Planning 12/13/2016: ACP Review of Chart / Resources Provided:  Reviewed chart for advance care plan.  Kait Augustin has no plan or code status on file. Discussed available resources and provided with information. Confirmed code status reflects current choices pending further ACP discussions.  Confirmed/documented legally designated decision makers.  Added by Radha Parekh            Malignant neoplasm of overlapping sites of bladder (H) 12/22/2015     Priority: Medium    H/O renal calculi 11/17/2014     Priority: Medium      Past Medical History:   Diagnosis Date    Cancer (H)     Bladder Cancer    H/O renal calculi 11/17/2014    MIGRAINE HEADACHES 1996    Nicotine dependence 11/17/2014    PONV (postoperative nausea and vomiting)     Tobacco abuse 11/17/2014     Past Surgical History:   Procedure Laterality Date    COMBINED CYSTOSCOPY, RETROGRADES, URETEROSCOPY, INSERT STENT Right 4/14/2025    Procedure: CYSTOURETEROSCOPY, WITH RETROGRADE PYELOGRAM, LASER ABLATION AND STENT INSERTION;  Surgeon: Dulce Maria Leblanc MD;  [facility]    CYSTOSCOPY, BIOPSY BLADDER, COMBINED N/A 4/14/2025    Procedure: CYSTOSCOPY, WITH BLADDER BIOPSY;  Surgeon: Dulce Maria Leblanc MD;  [facility]: [facility]    CYSTOSCOPY, RETROGRADES, INSERT STENT URETER(S), COMBINED Right 12/8/2015    Procedure: COMBINED CYSTOSCOPY, RETROGRADES, INSERT STENT URETER(S);  Surgeon: Weight,  Efren Fowler MD;  Location: [facility]    CYSTOSCOPY, RETROGRADES, INSERT STENT URETER(S), COMBINED Bilateral 8/4/2020    Procedure: bilateral retrograde pyelogram;  Surgeon: Delfino Walker MD;  Location: [facility]    CYSTOSCOPY, TRANSURETHRAL RESECTION (TUR) PROSTATE, COMBINED Bilateral 5/7/2019    Procedure: Transurethral Resection of Bladder Tumor, BILATERAL RETROGRADE PYELOGRAMS, RIGHT URETEROSCOPY WITH HOLMIUM LASER TUMOR ABLATION AND STENT PLACEMENT;  Surgeon: Delfino Walker MD;  Location: [facility]    CYSTOSCOPY, TRANSURETHRAL RESECTION (TUR) TUMOR BLADDER INSTILL CHEMOTHERAPY, COMBINED N/A 8/4/2020    Procedure: Transurethral resection of bladder tumor;  Surgeon: Delfino Walker MD;  [facility]: [facility]    CYSTOSCOPY, TRANSURETHRAL RESECTION (TUR) TUMOR BLADDER INSTILL CHEMOTHERAPY, COMBINED Bilateral 3/9/2021    Procedure: Transurethral resection of bladder tumor, bilateral retrograde pyelograms and gemcitabine instillation;  Surgeon: Delfino Walker MD;  [facility]    CYSTOSCOPY, TRANSURETHRAL RESECTION (TUR) TUMOR BLADDER, COMBINED N/A 2/10/2015    Procedure: COMBINED CYSTOSCOPY, TRANSURETHRAL RESECTION (TUR) TUMOR BLADDER;  Surgeon: Efren Couch MD;  [facility]    CYSTOSCOPY, TRANSURETHRAL RESECTION (TUR) TUMOR BLADDER, COMBINED N/A 12/8/2015    Procedure: COMBINED CYSTOSCOPY, TRANSURETHRAL RESECTION (TUR) TUMOR BLADDER;  Surgeon: Efren Couch MD;  [facility]    CYSTOSCOPY, TRANSURETHRAL RESECTION (TUR) TUMOR BLADDER, COMBINED Bilateral 11/2/2021    Procedure: Transurethral resection of bladder tumor and bilateral retrograde pyelograms with left stent placement;  Surgeon: Delfino Walker MD;  [facility]    CYSTOSCOPY, URETEROSCOPY, COMBINED Right 5/28/2019    Procedure: Right Ureteroscopy, Fulgaration if Right iUpper Tract Urothelial Carcinoma;  Surgeon: Delfino Walker MD;  Location: [facility]    CYSTOSCOPY, URETEROSCOPY, COMBINED Right 8/4/2020    Procedure: diagnostic  "ureteroscopy,ablation of urothelial tumor, possible fulguration through flexible ureteroscope;  Surgeon: Delfino Walker MD;  Location:  OR    [facility] LIGATE FALLOPIAN TUBE  1996     Current Outpatient Medications   Medication Sig Dispense Refill    metroNIDAZOLE (METROCREAM) 0.75 % external cream Apply topically 2 times daily.         Allergies   Allergen Reactions    No Known Allergies         Social History     Tobacco Use    Smoking status: Every Day     Current packs/day: 0.50     Average packs/day: 0.5 packs/day for 45.0 years (22.5 ttl pk-yrs)     Types: Cigarettes    Smokeless tobacco: Never   Substance Use Topics    Alcohol use: Not Currently     Family History   Problem Relation Age of Onset    C.A.D. Father         dx late 60's, CABG, angioplasty    Cancer Father         Lung, dx age 80 h/o tobacco use    Heart Disease Mother     Thyroid Disease Mother     Lipids Sister      History   Drug Use    Types: Marijuana     Comment: smokes; smokes occasionally             Review of Systems  Constitutional, HEENT, cardiovascular, pulmonary, gi and gu systems are negative, except as otherwise noted.    Objective    /68   Pulse 79   Temp 98.5  F (36.9  C) (Tympanic)   Resp 16   Ht 1.702 m (5' 7.01\")   Wt 56.4 kg (124 lb 6.4 oz)   LMP 06/18/2005   SpO2 96%   BMI 19.48 kg/m     Estimated body mass index is 19.48 kg/m  as calculated from the following:    Height as of this encounter: 1.702 m (5' 7.01\").    Weight as of this encounter: 56.4 kg (124 lb 6.4 oz).  Physical Exam  GENERAL: alert and no distress  EYES: Eyes grossly normal to inspection, PERRL and conjunctivae and sclerae normal  HENT: ear canals and TM's normal, nose and mouth without ulcers or lesions  NECK: no adenopathy, no asymmetry, masses, or scars  RESP: lungs clear to auscultation - no rales, rhonchi or wheezes  CV: regular rates and rhythm, normal S1 S2, no S3 or S4, and no murmur, click or rub  ABDOMEN: soft, nontender, no " hepatosplenomegaly, no masses and bowel sounds normal  MS: no gross musculoskeletal defects noted, no edema  SKIN: no suspicious lesions or rashes  NEURO: Normal strength and tone, mentation intact and speech normal  PSYCH: mentation appears normal, affect normal/bright    Recent Labs   Lab Test 04/11/25  0921   HGB 14.0      *   POTASSIUM 3.9   CR 0.76        Diagnostics  Recent Results (from the past 24 hours)   CBC with platelets    Collection Time: 07/08/25  3:35 PM   Result Value Ref Range    WBC Count 7.1 4.0 - 11.0 10e3/uL    RBC Count 4.23 3.80 - 5.20 10e6/uL    Hemoglobin 13.5 11.7 - 15.7 g/dL    Hematocrit 39.3 35.0 - 47.0 %    MCV 93 78 - 100 fL    MCH 31.9 26.5 - 33.0 pg    MCHC 34.4 31.5 - 36.5 g/dL    RDW 12.5 10.0 - 15.0 %    Platelet Count 244 150 - 450 10e3/uL   Extra Green Top (Lithium Heparin) Tube    Collection Time: 07/08/25  3:35 PM   Result Value Ref Range    Hold Specimen JIC       No EKG this visit, completed in the last 90 days.    Revised Cardiac Risk Index (RCRI)  The patient has the following serious cardiovascular risks for perioperative complications:   - No serious cardiac risks = 0 points     RCRI Interpretation: 0 points: Class I (very low risk - 0.4% complication rate)         Signed Electronically by: Swati Oneal MD  A copy of this evaluation report is provided to the requesting physician.

## 2025-07-09 ENCOUNTER — ANESTHESIA EVENT (OUTPATIENT)
Dept: SURGERY | Facility: HOSPITAL | Age: 65
End: 2025-07-09
Payer: MEDICARE

## 2025-07-09 ASSESSMENT — LIFESTYLE VARIABLES: TOBACCO_USE: 1

## 2025-07-09 NOTE — ANESTHESIA PREPROCEDURE EVALUATION
Anesthesia Pre-Procedure Evaluation    Patient: Kait Augustin   MRN: [id number] : 1960          Procedure : Procedure(s):  CYSTOURETEROSCOPY         Past Medical History:   Diagnosis Date    Cancer (H)     Bladder Cancer    H/O renal calculi 2014    MIGRAINE HEADACHES 1996    Nicotine dependence 2014    PONV (postoperative nausea and vomiting)     Tobacco abuse 2014      Past Surgical History:   Procedure Laterality Date    COMBINED CYSTOSCOPY, RETROGRADES, URETEROSCOPY, INSERT STENT Right 2025    Procedure: CYSTOURETEROSCOPY, WITH RETROGRADE PYELOGRAM, LASER ABLATION AND STENT INSERTION;  Surgeon: Dulce Maria Leblanc MD;  [facility]    CYSTOSCOPY, BIOPSY BLADDER, COMBINED N/A 2025    Procedure: CYSTOSCOPY, WITH BLADDER BIOPSY;  Surgeon: Dulce Maria Leblanc MD;  [facility]    CYSTOSCOPY, RETROGRADES, INSERT STENT URETER(S), COMBINED Right 2015    Procedure: COMBINED CYSTOSCOPY, RETROGRADES, INSERT STENT URETER(S);  Surgeon: Efren Couch MD;  [facility]    CYSTOSCOPY, RETROGRADES, INSERT STENT URETER(S), COMBINED Bilateral 2020    Procedure: bilateral retrograde pyelogram;  Surgeon: Delfino Walker MD;  [facility]: [facility]    CYSTOSCOPY, TRANSURETHRAL RESECTION (TUR) PROSTATE, COMBINED Bilateral 2019    Procedure: Transurethral Resection of Bladder Tumor, BILATERAL RETROGRADE PYELOGRAMS, RIGHT URETEROSCOPY WITH HOLMIUM LASER TUMOR ABLATION AND STENT PLACEMENT;  Surgeon: Delfino Walker MD;  Location: [facility]    CYSTOSCOPY, TRANSURETHRAL RESECTION (TUR) TUMOR BLADDER INSTILL CHEMOTHERAPY, COMBINED N/A 2020    Procedure: Transurethral resection of bladder tumor;  Surgeon: Delfino Walker MD;  [facility]    CYSTOSCOPY, TRANSURETHRAL RESECTION (TUR) TUMOR BLADDER INSTILL CHEMOTHERAPY, COMBINED Bilateral 3/9/2021    Procedure: Transurethral resection of bladder tumor, bilateral retrograde pyelograms and gemcitabine instillation;   Surgeon: Delfino Walker MD;  [facility]    CYSTOSCOPY, TRANSURETHRAL RESECTION (TUR) TUMOR BLADDER, COMBINED N/A 2/10/2015    Procedure: COMBINED CYSTOSCOPY, TRANSURETHRAL RESECTION (TUR) TUMOR BLADDER;  Surgeon: Efren Couch MD;  [facility]    CYSTOSCOPY, TRANSURETHRAL RESECTION (TUR) TUMOR BLADDER, COMBINED N/A 12/8/2015    Procedure: COMBINED CYSTOSCOPY, TRANSURETHRAL RESECTION (TUR) TUMOR BLADDER;  Surgeon: Efren Couch MD;  [facility]    CYSTOSCOPY, TRANSURETHRAL RESECTION (TUR) TUMOR BLADDER, COMBINED Bilateral 11/2/2021    Procedure: Transurethral resection of bladder tumor and bilateral retrograde pyelograms with left stent placement;  Surgeon: Delfino Walker MD;  [facility]    CYSTOSCOPY, URETEROSCOPY, COMBINED Right 5/28/2019    Procedure: Right Ureteroscopy, Fulgaration if Right iUpper Tract Urothelial Carcinoma;  Surgeon: Delfino Walker MD;  [facility]    CYSTOSCOPY, URETEROSCOPY, COMBINED Right 8/4/2020    Procedure: diagnostic ureteroscopy,ablation of urothelial tumor, possible fulguration through flexible ureteroscope;  Surgeon: Delfino Walker MD;  Location:  OR    [facility] LIGATE FALLOPIAN TUBE  1996      Allergies   Allergen Reactions    No Known Allergies       Social History     Tobacco Use    Smoking status: Every Day     Current packs/day: 0.50     Average packs/day: 0.5 packs/day for 45.0 years (22.5 ttl pk-yrs)     Types: Cigarettes    Smokeless tobacco: Never   Substance Use Topics    Alcohol use: Not Currently      Wt Readings from Last 1 Encounters:   07/08/25 56.4 kg (124 lb 6.4 oz)        Anesthesia Evaluation   Pt has had prior anesthetic. Type: General.    History of anesthetic complications  - PONV.      ROS/MED HX  ENT/Pulmonary:     (+)                tobacco use, Current use, 0.5 packs/day, 23  Pack-Year Hx,                      Neurologic:     (+)      migraines,                          Cardiovascular:  - neg cardiovascular ROS   (+)  " - -   -  - -                                 Previous cardiac testing   Echo: Date: Results:    Stress Test:  Date: Results:    ECG Reviewed:  Date: 4/11/25 Results:  HR 65, NSR  Cath:  Date: Results:      METS/Exercise Tolerance:     Hematologic:  - neg hematologic  ROS     Musculoskeletal:  - neg musculoskeletal ROS     GI/Hepatic:  - neg GI/hepatic ROS     Renal/Genitourinary: Comment: Hx Urothelial carcinoma of kidney - recurrence 2025 4/2025 imaging: Benign-appearing cyst left kidney.    (+) renal disease,             Endo:  - neg endo ROS     Psychiatric/Substance Use:     (+)     Recreational drug usage: Cannabis.    Infectious Disease:  - neg infectious disease ROS     Malignancy: Comment: Right kidney cancer  (+) Malignancy, History of Other.Other CA Bladder/right kidney Active status post Surgery.    Other:  - neg other ROS            Physical Exam  Airway  Mallampati: I  Neck ROM: full  Mouth opening: >= 4 cm    Cardiovascular   Rhythm: regular  Rate: normal rate     Dental   (+) Modest Abnormalities - crowns, retainers, 1 or 2 missing teeth      Pulmonary Breath sounds clear to auscultation        Neurological   She appears awake, alert and oriented x3.    Other Findings       OUTSIDE LABS:  CBC:   Lab Results   Component Value Date    WBC 7.1 07/08/2025    WBC 7.4 04/11/2025    HGB 13.5 07/08/2025    HGB 14.0 04/11/2025    HCT 39.3 07/08/2025    HCT 42.2 04/11/2025     07/08/2025     04/11/2025     BMP:   Lab Results   Component Value Date     (H) 04/11/2025     11/05/2021    POTASSIUM 3.9 04/11/2025    POTASSIUM 3.6 11/05/2021    CHLORIDE 109 (H) 04/11/2025    CHLORIDE 111 (H) 11/05/2021    CO2 29 04/11/2025    CO2 26 11/05/2021    BUN 12.1 04/11/2025    BUN 13 11/05/2021    CR 0.76 04/11/2025    CR 0.71 11/05/2021    GLC 80 04/11/2025     (H) 11/05/2021     COAGS:   Lab Results   Component Value Date    INR 1.0 01/23/2015     POC: No results found for: \"BGM\", " "\"HCG\", \"HCGS\"  HEPATIC:   Lab Results   Component Value Date    ALBUMIN 4.4 04/11/2025    PROTTOTAL 7.0 04/11/2025    ALT 16 04/11/2025    AST 19 04/11/2025    ALKPHOS 110 04/11/2025    BILITOTAL 0.6 04/11/2025     OTHER:   Lab Results   Component Value Date    LONG 9.1 04/11/2025    PHOS 3.7 05/22/2019    TSH 0.47 08/24/2020       Anesthesia Plan    ASA Status:  2      NPO Status: NPO Appropriate   Anesthesia Type: General.  Induction: intravenous.  Maintenance: TIVA, Balanced.   Techniques and Equipment:       - Monitoring Plan: standard ASA monitoring     Consents    Anesthesia Plan(s) and associated risks, benefits, and realistic alternatives discussed. Questions answered and patient/representative(s) expressed understanding.     - Discussed: CRNA     - Discussed with:  Patient        - Pt is DNR/DNI Status: no DNR     Blood Consent:      - Discussed with: patient.     Postoperative Care         Comments:    Other Comments: Reviewed chart and 7/8 [facility]    Discussed risks and benefits with patient for general anesthesia including sore throat, nausea, vomiting, aspiration, dental damage, loss of airway, CV complications, stroke, MI, death. Pt wishes to proceed.                Emily Brown, APRN CNP    I have reviewed the pertinent notes and labs in the chart from the past 30 days and (re)examined the patient.  Any updates or changes from those notes are reflected in this note.    Clinically Significant Risk Factors Present on Admission                                              "

## 2025-07-15 ENCOUNTER — APPOINTMENT (OUTPATIENT)
Dept: GENERAL RADIOLOGY | Facility: HOSPITAL | Age: 65
End: 2025-07-15
Attending: UROLOGY
Payer: MEDICARE

## 2025-07-15 ENCOUNTER — ANESTHESIA (OUTPATIENT)
Dept: SURGERY | Facility: HOSPITAL | Age: 65
End: 2025-07-15
Payer: MEDICARE

## 2025-07-15 ENCOUNTER — HOSPITAL ENCOUNTER (OUTPATIENT)
Facility: HOSPITAL | Age: 65
Discharge: HOME | End: 2025-07-15
Attending: UROLOGY | Admitting: UROLOGY
Payer: MEDICARE

## 2025-07-15 VITALS
SYSTOLIC BLOOD PRESSURE: 108 MMHG | RESPIRATION RATE: 16 BRPM | WEIGHT: 120 LBS | HEIGHT: 67 IN | OXYGEN SATURATION: 95 % | DIASTOLIC BLOOD PRESSURE: 59 MMHG | HEART RATE: 55 BPM | TEMPERATURE: 97 F | BODY MASS INDEX: 18.83 KG/M2

## 2025-07-15 DIAGNOSIS — C64.1 UROTHELIAL CARCINOMA OF KIDNEY, RIGHT (H): Primary | ICD-10-CM

## 2025-07-15 PROCEDURE — 250N000009 HC RX 250: Performed by: NURSE ANESTHETIST, CERTIFIED REGISTERED

## 2025-07-15 PROCEDURE — 52354 CYSTOURETERO W/BIOPSY: CPT | Mod: RT | Performed by: UROLOGY

## 2025-07-15 PROCEDURE — 999N000179 XR SURGERY CARM FLUORO LESS THAN 5 MIN W STILLS

## 2025-07-15 PROCEDURE — 258N000003 HC RX IP 258 OP 636: Performed by: NURSE ANESTHETIST, CERTIFIED REGISTERED

## 2025-07-15 PROCEDURE — 999N000141 HC STATISTIC PRE-PROCEDURE NURSING ASSESSMENT: Performed by: UROLOGY

## 2025-07-15 PROCEDURE — 710N000010 HC RECOVERY PHASE 1, LEVEL 2, PER MIN: Performed by: UROLOGY

## 2025-07-15 PROCEDURE — 255N000002 HC RX 255 OP 636: Performed by: UROLOGY

## 2025-07-15 PROCEDURE — 710N000012 HC RECOVERY PHASE 2, PER MINUTE: Performed by: UROLOGY

## 2025-07-15 PROCEDURE — 52356 CYSTO/URETERO W/LITHOTRIPSY: CPT | Mod: RT | Performed by: UROLOGY

## 2025-07-15 PROCEDURE — C1769 GUIDE WIRE: HCPCS | Performed by: UROLOGY

## 2025-07-15 PROCEDURE — 370N000017 HC ANESTHESIA TECHNICAL FEE, PER MIN: Performed by: UROLOGY

## 2025-07-15 PROCEDURE — 258N000003 HC RX IP 258 OP 636: Performed by: NURSE PRACTITIONER

## 2025-07-15 PROCEDURE — 272N000001 HC OR GENERAL SUPPLY STERILE: Performed by: UROLOGY

## 2025-07-15 PROCEDURE — C1758 CATHETER, URETERAL: HCPCS | Performed by: UROLOGY

## 2025-07-15 PROCEDURE — C2617 STENT, NON-COR, TEM W/O DEL: HCPCS | Performed by: UROLOGY

## 2025-07-15 PROCEDURE — 250N000025 HC SEVOFLURANE, PER MIN: Performed by: UROLOGY

## 2025-07-15 PROCEDURE — 250N000011 HC RX IP 250 OP 636: Performed by: UROLOGY

## 2025-07-15 PROCEDURE — 360N000082 HC SURGERY LEVEL 2 W/ FLUORO, PER MIN: Performed by: UROLOGY

## 2025-07-15 PROCEDURE — 82365 CALCULUS SPECTROSCOPY: CPT | Performed by: UROLOGY

## 2025-07-15 PROCEDURE — 250N000011 HC RX IP 250 OP 636: Performed by: NURSE ANESTHETIST, CERTIFIED REGISTERED

## 2025-07-15 DEVICE — STENT URETERAL SOF-CURL TECOFLEX 6FRX24CM SSC6024: Type: IMPLANTABLE DEVICE | Site: URETER | Status: FUNCTIONAL

## 2025-07-15 RX ORDER — FENTANYL CITRATE 50 UG/ML
INJECTION, SOLUTION INTRAMUSCULAR; INTRAVENOUS PRN
Status: DISCONTINUED | OUTPATIENT
Start: 2025-07-15 | End: 2025-07-15

## 2025-07-15 RX ORDER — SULFAMETHOXAZOLE AND TRIMETHOPRIM 800; 160 MG/1; MG/1
1 TABLET ORAL ONCE
Qty: 1 TABLET | Refills: 0 | Status: SHIPPED | OUTPATIENT
Start: 2025-07-15 | End: 2025-07-15

## 2025-07-15 RX ORDER — HYDROMORPHONE HYDROCHLORIDE 1 MG/ML
0.25 INJECTION, SOLUTION INTRAMUSCULAR; INTRAVENOUS; SUBCUTANEOUS EVERY 5 MIN PRN
Status: DISCONTINUED | OUTPATIENT
Start: 2025-07-15 | End: 2025-07-15 | Stop reason: HOSPADM

## 2025-07-15 RX ORDER — CEFAZOLIN SODIUM/WATER 2 G/20 ML
2 SYRINGE (ML) INTRAVENOUS SEE ADMIN INSTRUCTIONS
Status: DISCONTINUED | OUTPATIENT
Start: 2025-07-15 | End: 2025-07-15 | Stop reason: HOSPADM

## 2025-07-15 RX ORDER — SODIUM CHLORIDE, SODIUM LACTATE, POTASSIUM CHLORIDE, CALCIUM CHLORIDE 600; 310; 30; 20 MG/100ML; MG/100ML; MG/100ML; MG/100ML
INJECTION, SOLUTION INTRAVENOUS CONTINUOUS
Status: DISCONTINUED | OUTPATIENT
Start: 2025-07-15 | End: 2025-07-15 | Stop reason: HOSPADM

## 2025-07-15 RX ORDER — ALBUTEROL SULFATE 0.83 MG/ML
2.5 SOLUTION RESPIRATORY (INHALATION) EVERY 4 HOURS PRN
Status: DISCONTINUED | OUTPATIENT
Start: 2025-07-15 | End: 2025-07-15 | Stop reason: HOSPADM

## 2025-07-15 RX ORDER — IOPAMIDOL 612 MG/ML
INJECTION, SOLUTION INTRAVASCULAR PRN
Status: DISCONTINUED | OUTPATIENT
Start: 2025-07-15 | End: 2025-07-15 | Stop reason: HOSPADM

## 2025-07-15 RX ORDER — DEXAMETHASONE SODIUM PHOSPHATE 4 MG/ML
4 INJECTION, SOLUTION INTRA-ARTICULAR; INTRALESIONAL; INTRAMUSCULAR; INTRAVENOUS; SOFT TISSUE
Status: DISCONTINUED | OUTPATIENT
Start: 2025-07-15 | End: 2025-07-15 | Stop reason: HOSPADM

## 2025-07-15 RX ORDER — LIDOCAINE HYDROCHLORIDE 20 MG/ML
INJECTION, SOLUTION INFILTRATION; PERINEURAL PRN
Status: DISCONTINUED | OUTPATIENT
Start: 2025-07-15 | End: 2025-07-15

## 2025-07-15 RX ORDER — FENTANYL CITRATE 50 UG/ML
25 INJECTION, SOLUTION INTRAMUSCULAR; INTRAVENOUS EVERY 5 MIN PRN
Status: DISCONTINUED | OUTPATIENT
Start: 2025-07-15 | End: 2025-07-15 | Stop reason: HOSPADM

## 2025-07-15 RX ORDER — PROPOFOL 10 MG/ML
INJECTION, EMULSION INTRAVENOUS PRN
Status: DISCONTINUED | OUTPATIENT
Start: 2025-07-15 | End: 2025-07-15

## 2025-07-15 RX ORDER — DEXAMETHASONE SODIUM PHOSPHATE 4 MG/ML
INJECTION, SOLUTION INTRA-ARTICULAR; INTRALESIONAL; INTRAMUSCULAR; INTRAVENOUS; SOFT TISSUE PRN
Status: DISCONTINUED | OUTPATIENT
Start: 2025-07-15 | End: 2025-07-15

## 2025-07-15 RX ORDER — ONDANSETRON 2 MG/ML
INJECTION INTRAMUSCULAR; INTRAVENOUS PRN
Status: DISCONTINUED | OUTPATIENT
Start: 2025-07-15 | End: 2025-07-15

## 2025-07-15 RX ORDER — LIDOCAINE 40 MG/G
CREAM TOPICAL
Status: DISCONTINUED | OUTPATIENT
Start: 2025-07-15 | End: 2025-07-15 | Stop reason: HOSPADM

## 2025-07-15 RX ORDER — GLYCOPYRROLATE 0.2 MG/ML
INJECTION, SOLUTION INTRAMUSCULAR; INTRAVENOUS PRN
Status: DISCONTINUED | OUTPATIENT
Start: 2025-07-15 | End: 2025-07-15

## 2025-07-15 RX ORDER — FENTANYL CITRATE 50 UG/ML
50 INJECTION, SOLUTION INTRAMUSCULAR; INTRAVENOUS EVERY 5 MIN PRN
Status: DISCONTINUED | OUTPATIENT
Start: 2025-07-15 | End: 2025-07-15 | Stop reason: HOSPADM

## 2025-07-15 RX ORDER — HYDRALAZINE HYDROCHLORIDE 20 MG/ML
2.5-5 INJECTION INTRAMUSCULAR; INTRAVENOUS EVERY 10 MIN PRN
Status: DISCONTINUED | OUTPATIENT
Start: 2025-07-15 | End: 2025-07-15 | Stop reason: HOSPADM

## 2025-07-15 RX ORDER — EPHEDRINE SULFATE 50 MG/ML
INJECTION, SOLUTION INTRAMUSCULAR; INTRAVENOUS; SUBCUTANEOUS PRN
Status: DISCONTINUED | OUTPATIENT
Start: 2025-07-15 | End: 2025-07-15

## 2025-07-15 RX ORDER — HALOPERIDOL 5 MG/ML
2 INJECTION INTRAMUSCULAR
Status: DISCONTINUED | OUTPATIENT
Start: 2025-07-15 | End: 2025-07-15 | Stop reason: HOSPADM

## 2025-07-15 RX ORDER — ONDANSETRON 4 MG/1
4 TABLET, ORALLY DISINTEGRATING ORAL EVERY 30 MIN PRN
Status: DISCONTINUED | OUTPATIENT
Start: 2025-07-15 | End: 2025-07-15 | Stop reason: HOSPADM

## 2025-07-15 RX ORDER — DIAZEPAM 10 MG/2ML
2.5 INJECTION, SOLUTION INTRAMUSCULAR; INTRAVENOUS
Status: DISCONTINUED | OUTPATIENT
Start: 2025-07-15 | End: 2025-07-15 | Stop reason: HOSPADM

## 2025-07-15 RX ORDER — SCOPOLAMINE 1 MG/3D
1 PATCH, EXTENDED RELEASE TRANSDERMAL ONCE
Status: DISCONTINUED | OUTPATIENT
Start: 2025-07-15 | End: 2025-07-15 | Stop reason: HOSPADM

## 2025-07-15 RX ORDER — CEFAZOLIN SODIUM/WATER 2 G/20 ML
2 SYRINGE (ML) INTRAVENOUS
Status: DISCONTINUED | OUTPATIENT
Start: 2025-07-15 | End: 2025-07-15 | Stop reason: HOSPADM

## 2025-07-15 RX ORDER — NALOXONE HYDROCHLORIDE 0.4 MG/ML
0.1 INJECTION, SOLUTION INTRAMUSCULAR; INTRAVENOUS; SUBCUTANEOUS
Status: DISCONTINUED | OUTPATIENT
Start: 2025-07-15 | End: 2025-07-15 | Stop reason: HOSPADM

## 2025-07-15 RX ORDER — ONDANSETRON 2 MG/ML
4 INJECTION INTRAMUSCULAR; INTRAVENOUS EVERY 30 MIN PRN
Status: DISCONTINUED | OUTPATIENT
Start: 2025-07-15 | End: 2025-07-15 | Stop reason: HOSPADM

## 2025-07-15 RX ORDER — HYDROMORPHONE HYDROCHLORIDE 1 MG/ML
0.5 INJECTION, SOLUTION INTRAMUSCULAR; INTRAVENOUS; SUBCUTANEOUS EVERY 5 MIN PRN
Status: DISCONTINUED | OUTPATIENT
Start: 2025-07-15 | End: 2025-07-15 | Stop reason: HOSPADM

## 2025-07-15 RX ADMIN — SCOPOLAMINE 1 PATCH: 1.5 PATCH, EXTENDED RELEASE TRANSDERMAL at 10:16

## 2025-07-15 RX ADMIN — GLYCOPYRROLATE 0.1 MG: 0.2 INJECTION, SOLUTION INTRAMUSCULAR; INTRAVENOUS at 11:03

## 2025-07-15 RX ADMIN — LIDOCAINE HYDROCHLORIDE 40 MG: 20 INJECTION, SOLUTION INFILTRATION; PERINEURAL at 10:50

## 2025-07-15 RX ADMIN — Medication 5 MG: at 11:04

## 2025-07-15 RX ADMIN — FENTANYL CITRATE 50 MCG: 50 INJECTION INTRAMUSCULAR; INTRAVENOUS at 10:47

## 2025-07-15 RX ADMIN — MIDAZOLAM 2 MG: 1 INJECTION INTRAMUSCULAR; INTRAVENOUS at 10:44

## 2025-07-15 RX ADMIN — DEXAMETHASONE SODIUM PHOSPHATE 4 MG: 4 INJECTION, SOLUTION INTRA-ARTICULAR; INTRALESIONAL; INTRAMUSCULAR; INTRAVENOUS; SOFT TISSUE at 11:00

## 2025-07-15 RX ADMIN — PHENYLEPHRINE HYDROCHLORIDE 100 MCG: 10 INJECTION INTRAVENOUS at 11:00

## 2025-07-15 RX ADMIN — Medication 2 G: at 10:40

## 2025-07-15 RX ADMIN — PROPOFOL 180 MG: 10 INJECTION, EMULSION INTRAVENOUS at 10:50

## 2025-07-15 RX ADMIN — SODIUM CHLORIDE, POTASSIUM CHLORIDE, SODIUM LACTATE AND CALCIUM CHLORIDE: 600; 310; 30; 20 INJECTION, SOLUTION INTRAVENOUS at 10:15

## 2025-07-15 RX ADMIN — ONDANSETRON 4 MG: 2 INJECTION INTRAMUSCULAR; INTRAVENOUS at 11:18

## 2025-07-15 ASSESSMENT — ACTIVITIES OF DAILY LIVING (ADL)
ADLS_ACUITY_SCORE: 18

## 2025-07-15 NOTE — OR NURSING
Patient and responsible adult given discharge instructions with no questions regarding instructions. Javon score 20/20. Pain level 0/10.  Discharged from unit via walking. Patient discharged to home with daughter.

## 2025-07-15 NOTE — H&P
The History and Physical has been reviewed, the patient has been examined and no changes have occurred in the patient's condition since the H & P was completed.     She has been marked and consented.  Proceed as planned.

## 2025-07-15 NOTE — ANESTHESIA CARE TRANSFER NOTE
Patient: Kait Augustin    Procedure: Procedure(s):  CYSTOURETEROSCOPY, retrograde pyelogram, laser ablation of tumor, laser lithotrispy,stone retrieval, stent placement       Diagnosis: Urothelial carcinoma of kidney, right (H) [C64.1]  Diagnosis Additional Information: No value filed.    Anesthesia Type:   General     Note:    Oropharynx: oropharynx clear of all foreign objects and spontaneously breathing  Level of Consciousness: drowsy  Oxygen Supplementation: nasal cannula  Level of Supplemental Oxygen (L/min / FiO2): 2  Independent Airway: airway patency satisfactory and stable  Dentition: dentition unchanged  Vital Signs Stable: post-procedure vital signs reviewed and stable  Report to RN Given: handoff report given  Patient transferred to: PACU    Handoff Report: Identifed the Patient, Identified the Reponsible Provider, Reviewed the pertinent medical history, Discussed the surgical course, Reviewed Intra-OP anesthesia mangement and issues during anesthesia, Set expectations for post-procedure period and Allowed opportunity for questions and acknowledgement of understanding    Vitals:  Vitals Value Taken Time   /61 07/15/25 11:42   Temp     Pulse 73 07/15/25 11:45   Resp 12 07/15/25 11:45   SpO2 97 % 07/15/25 11:45   Vitals shown include unfiled device data.    Electronically Signed By: AISSATOU Barrera CRNA  July 15, 2025  11:46 AM

## 2025-07-15 NOTE — ANESTHESIA PROCEDURE NOTES
Airway    Staff -        CRNA: Rubi Gallo APRN CRNA       Performed By: CRNA  Consent for Airway        Urgency: elective  Indications and Patient Condition       Indications for airway management: sangita-procedural       Induction type:intravenous       Mask difficulty assessment: 0 - not attempted    Final Airway Details       Final airway type: supraglottic airway    Supraglottic Airway Details        Type: LMA       Brand: I-Gel       LMA size: 3    Post intubation assessment        Placement verified by: capnometry, equal breath sounds and chest rise        Number of attempts at approach: 1       Number of other approaches attempted: 0       Secured with: commercial tube davidson and tape       Ease of procedure: easy       Dentition: Intact and Unchanged

## 2025-07-15 NOTE — DISCHARGE INSTRUCTIONS
FROM YOUR SURGEON:     Remove your stent at home on Sunday 7/20/25.  Take the single dose of prescribed antibiotic and then pull the string until the stent comes out.      Make sure you are drinking plenty of water.  Goal is about 3 liters/90 oz total per day.    You will be scheduled for another scope procedure in about 4 months.    Thank you for having your procedure with [facility].  Our nurse, Amber will be reaching out to you from the clinic to help make all of your necessary follow up arrangements.  If you are having pain that is out of proportion as is described in the surgery sheet provided to you by the clinic please reach out to  Amber at [phone].       electronic

## 2025-07-15 NOTE — ANESTHESIA POSTPROCEDURE EVALUATION
Patient: Kait Augustin    Procedure: Procedure(s):  CYSTOURETEROSCOPY, retrograde pyelogram, laser ablation of tumor, laser lithotrispy,stone retrieval, stent placement       Anesthesia Type:  General    Note:  Disposition: Outpatient   Postop Pain Control: Uneventful            Sign Out: Well controlled pain   PONV: No   Neuro/Psych: Uneventful            Sign Out: Acceptable/Baseline neuro status   Airway/Respiratory: Uneventful            Sign Out: Acceptable/Baseline resp. status   CV/Hemodynamics: Uneventful            Sign Out: Acceptable CV status; No obvious hypovolemia; No obvious fluid overload   Other NRE: NONE   DID A NON-ROUTINE EVENT OCCUR?        Last vitals:  Vitals Value Taken Time   /59 07/15/25 12:16   Temp 97.6  F (36.4  C) 07/15/25 12:15   Pulse 68 07/15/25 12:15   Resp 16 07/15/25 12:16   SpO2 95 % 07/15/25 12:16       Electronically Signed By: AISSATOU Asher CRNA  July 15, 2025  1:10 PM

## 2025-07-15 NOTE — OP NOTE
[facility] Operative Note    Pre-operative diagnosis: Urothelial carcinoma of kidney, right (H) [C64.1]   Post-operative diagnosis right renal pelvis tumor   Procedure: CYSTOURETEROSCOPY, retrograde pyelogram, laser ablation of tumor, laser lithotrispy,stone retrieval, stent placement - Right   Surgeon: Dulce Maria Leblanc MD   Assistants(s): None   Estimated blood loss: minimal   Specimens: Right renal stone   Findings: Small papillary tumor adjacent to prior ablation and in a midrenal calyx, ablated with laser.  Adherent stone on prior ablation site treated with laser, larger fragment removed.  6F x 24cm stent with string placed at conclusion.     Indication for procedure: 65F with history of bladder cancer and right upper tract urothelial cancer.  3 months ago she underwent ureteroscopy with ablation of a tumor in the right renal pelvis as well as a bladder biopsy that was benign.  She presents today for right upper tract surveillance ureteroscopy.    Procedure in detail: The patient was identified and greeted in the preoperative holding area.  She was marked and consented for the procedure.  She was brought to the operating room and placed supine on the OR table.  General anesthesia was induced.  She was moved to a dorsal lithotomy position and prepped and draped in the standard fashion.  A surgical timeout was performed.  The 22 Gib[place]tarian rigid cystoscope was inserted through the urethra and into the bladder.  Upon entering the bladder, it was surveyed in 360 degrees using the 30 degree lens.  There were multiple areas of scarring.  Both ureteral orifices were orthotopic but had been previously resected.  The recent biopsy site showed a small amount of adherent calcification.  No new tumor was appreciated. Then, a 5 [place]tarian open-ended ureteral catheter was inserted.  It was advanced into the right ureteral orifice.  Omnipaque contrast was instilled to perform the retrograde pyelogram.  The ureter was  normal in its course and caliber without any evidence of filling defect or irregularity.  The kidney was similarly normal with sharp calyces and no obvious filling defects.  A Glidewire was placed through the ureteral catheter and seen to coil on fluoroscopy in the upper pole.  The cystoscope was removed.  The flexible digital ureteroscope was advanced alongside the wire into the ureter.  The entire length of the ureter was normal.  Upon entering the kidney, the entrance to the upper pole where the prior tumor was ablated showed a large flat adherent stone, very thin but covering the entire surface.  There was a small amount of papillary tumor to the medial edge of this.  The remainder of the kidney was surveyed.  In a midrenal calyx with a slightly narrowed entry, there was a small amount of papillary tumor present posteriorly, approximately 5mm in diameter.  There was no other new tumor appreciated.  The 200 micron fiber the for Thulium laser was inserted.  Using the tissue ablation settings, the abnormal areas were treated.  Then, lithotripsy/kidney stone preset was selected, and the stone on the prior resection site was treated.  The N-Jimbo basket was used to extract a prominent fragment. The stone was removed as the ureteroscope was withdrawn.  The ureter showed no signs of injury or retained stone.  The wire was backloaded into the cystoscope, and then a 6 Indonesian by 24 cm stent was placed over the wire.  The proximal coil was in good position on fluoroscopy in the renal pelvis and the distal coil was visibly in good position within the bladder.  The string was left on the stent.  The bladder was emptied and the cystoscope removed.  The string was trimmed and tucked into her vagina.  The patient was awakened and taken to recovery in stable condition.     Plan: Patient to remove own stent at home in 5 days.  Return for surveillance in the OR in 4 months.

## 2025-07-16 ENCOUNTER — PREP FOR PROCEDURE (OUTPATIENT)
Dept: UROLOGY | Facility: OTHER | Age: 65
End: 2025-07-16

## 2025-07-16 DIAGNOSIS — C64.1 UROTHELIAL CARCINOMA OF KIDNEY, RIGHT (H): Primary | ICD-10-CM

## 2025-07-16 NOTE — PROGRESS NOTES
CYSTOURETEROSCOPY, retrograde pyelogram, laser ablation of tumor, laser lithotrispy,stone retrieval, stent placement   By Dr. Leblanc on 11/12/25   Preop needed, pt aware  Pre-op education provided and surgery education information  C-[facility]     Thank you,     Amber BARBER BSN, PHN  RN Care Coordinator Urology  [facility]

## 2025-07-20 LAB
APPEARANCE STONE: NORMAL
COMPN STONE: NORMAL
SPECIMEN WT: 5 MG

## 2025-08-05 ENCOUNTER — HOSPITAL ENCOUNTER (OUTPATIENT)
Dept: CT IMAGING | Facility: HOSPITAL | Age: 65
Discharge: HOME | End: 2025-08-05
Attending: NURSE PRACTITIONER
Payer: MEDICARE

## 2025-08-05 DIAGNOSIS — Z87.891 PERSONAL HISTORY OF TOBACCO USE: ICD-10-CM

## 2025-08-05 PROCEDURE — 71271 CT THORAX LUNG CANCER SCR C-: CPT | Mod: 26 | Performed by: RADIOLOGY

## 2025-08-05 PROCEDURE — 71271 CT THORAX LUNG CANCER SCR C-: CPT

## (undated) DEVICE — GLOVE PROTEXIS POWDER FREE SMT 8.5 2D72PT85X

## (undated) DEVICE — FIBER LASER 200 UM DISPOSABLE TFL-FBX200S

## (undated) DEVICE — PAD CHUX UNDERPAD 30X36" P3036C

## (undated) DEVICE — SYR 50ML CATH TIP W/O NDL 309620

## (undated) DEVICE — TUBING SUCTION 10'X3/16" N510

## (undated) DEVICE — WIRE GLIDE 0.038"X150CM 3CM STR UWR1038

## (undated) DEVICE — TRAY SKIN PREP POVIDONE/IODINE DYND70372

## (undated) DEVICE — CATHETER SOF-FLEX OPEN END URETERAL 70CM/5FR 020038-C5

## (undated) DEVICE — BAG UROLOGICAL TABLE URO CATCHER P056397909

## (undated) DEVICE — PACK BASIN SET UP SUTCNBSBBA

## (undated) DEVICE — PACK CYSTO SBA15CSFCA

## (undated) DEVICE — GUIDEWIRE AMPLATZ SUPER STIFF .038"X145CM M0066401061

## (undated) DEVICE — SOLUTION IV IRRIGATION 0.9% NACL 3L R8206

## (undated) DEVICE — CATH URETERAL 5FRX70CM OPEN END FLEX TIP G14521

## (undated) DEVICE — BAG URINARY DRAIN LEG MEDIUM 19OZ LF 150719

## (undated) DEVICE — TUBING IRR TUR Y TYPE 2C4041

## (undated) DEVICE — TEASPOON METAL STERILE 17506/24

## (undated) DEVICE — GUIDEWIRE SENSOR DUAL FLEX STR 0.035"X150CM M0066703080

## (undated) DEVICE — SOL NACL 0.9% IRRIG 1000ML BOTTLE 2F7124

## (undated) DEVICE — EXTRACTOR NGAGE NITINOL 1.7FRX 15CM MODIFIED BASKET G48295

## (undated) DEVICE — BAG URINARY DRAIN 2000ML LF 154002

## (undated) DEVICE — SOL WATER 1500ML

## (undated) DEVICE — JELLY LUBRICATING SURGILUBE 2OZ TUBE

## (undated) DEVICE — ESU ELEC LOOP 24FR 20750G

## (undated) DEVICE — SLEEVE SCD EXPRESS KNEE LENGTH MED 9529

## (undated) DEVICE — COVER LT HANDLE 2/PK 5160-2FG

## (undated) DEVICE — BAG URINARY DRAIN 4000ML LF 153509

## (undated) DEVICE — CATH FOLEY COUDE TIEMAN 20FR 5ML LUBRICATH LATEX 0102L20

## (undated) DEVICE — Device

## (undated) DEVICE — TUBING EXTENSION FOLEY CATH W/CONNECTOR 9346

## (undated) DEVICE — DEVICE CATH STABILIZATION STATLOCK FOLEY 2-WAY FOL0102

## (undated) DEVICE — TUOGHY BORST ADAPTER WITH SIDE ARM

## (undated) DEVICE — PUMP SYSTEM SINGLE ACTION M0067201000

## (undated) DEVICE — SHEATH URETERAL ACCESS NAVIGATOR HD 11/13FRX36CM M0062502220

## (undated) DEVICE — BAG PRESSURE INFUSION 1000ML COLORED GA 8808

## (undated) DEVICE — BAG PRESSURE INFUSION 3000ML COLORED GA 8809

## (undated) DEVICE — SHEATH URETERAL ACCESS 9.5FR X 35CM FUS-095035

## (undated) DEVICE — CONTAINER SPECIMEN 4OZ STERILE H1015I

## (undated) DEVICE — SLEEVE COMPRESSION SCD KNEE MED 74022

## (undated) DEVICE — FASTENER LEGBAND CATHETER DALE 316

## (undated) DEVICE — ESU ELEC LOOP 27FR 27050F

## (undated) DEVICE — ESU GROUND PAD ADULT W/CORD E7507

## (undated) DEVICE — PACK GYN CYSTO CUSTOM SMA32GCMBF

## (undated) DEVICE — SOL WATER IRRIG 1000ML BOTTLE 2F7114

## (undated) DEVICE — SHEATH URETERAL ACCESS NAVIGATOR HD 12/14FRX46CM M0062502260

## (undated) DEVICE — FIBER LASER THULIUM 365 UM DISPOSABLE TFL-FBX365S

## (undated) DEVICE — LASER FIBER HOLMIUM FLEXIVA TRACTIP 200UM M0068403960

## (undated) DEVICE — LABEL STERILE PREPRINTED FOR OR FRRH01-2M

## (undated) DEVICE — COVER LT HANDLE 2/PK 15160-2FG

## (undated) DEVICE — ESU ELEC LOOP HF-RESECTION 24FR MED 30 W/CABLE WA22606S

## (undated) DEVICE — CATH FOLEY COUDE TIEMAN 18FR 5ML LUBRICATH LATEX 0102L18

## (undated) DEVICE — ESU ELEC BAND RESECTION 24FR 30DEG W/CABLE WA22623C

## (undated) RX ORDER — SCOLOPAMINE TRANSDERMAL SYSTEM 1 MG/1
PATCH, EXTENDED RELEASE TRANSDERMAL
Status: DISPENSED
Start: 2019-05-28

## (undated) RX ORDER — CEFUROXIME AXETIL 250 MG/1
TABLET ORAL
Status: DISPENSED
Start: 2021-03-15

## (undated) RX ORDER — DEXAMETHASONE SODIUM PHOSPHATE 10 MG/ML
INJECTION, SOLUTION INTRAMUSCULAR; INTRAVENOUS
Status: DISPENSED
Start: 2025-04-14

## (undated) RX ORDER — KETOROLAC TROMETHAMINE 30 MG/ML
INJECTION, SOLUTION INTRAMUSCULAR; INTRAVENOUS
Status: DISPENSED
Start: 2020-08-04

## (undated) RX ORDER — ATROPA BELLADONNA AND OPIUM 16.2; 3 MG/1; MG/1
SUPPOSITORY RECTAL
Status: DISPENSED
Start: 2020-08-04

## (undated) RX ORDER — DEXAMETHASONE SODIUM PHOSPHATE 4 MG/ML
INJECTION, SOLUTION INTRA-ARTICULAR; INTRALESIONAL; INTRAMUSCULAR; INTRAVENOUS; SOFT TISSUE
Status: DISPENSED
Start: 2019-05-28

## (undated) RX ORDER — PROPOFOL 10 MG/ML
INJECTION, EMULSION INTRAVENOUS
Status: DISPENSED
Start: 2021-11-02

## (undated) RX ORDER — CEFTRIAXONE SODIUM 1 G/50ML
INJECTION, SOLUTION INTRAVENOUS
Status: DISPENSED
Start: 2020-08-04

## (undated) RX ORDER — HYDROCODONE BITARTRATE AND ACETAMINOPHEN 5; 325 MG/1; MG/1
TABLET ORAL
Status: DISPENSED
Start: 2020-08-04

## (undated) RX ORDER — ONDANSETRON 2 MG/ML
INJECTION INTRAMUSCULAR; INTRAVENOUS
Status: DISPENSED
Start: 2020-08-04

## (undated) RX ORDER — FENTANYL CITRATE-0.9 % NACL/PF 10 MCG/ML
PLASTIC BAG, INJECTION (ML) INTRAVENOUS
Status: DISPENSED
Start: 2020-08-04

## (undated) RX ORDER — FENTANYL CITRATE 50 UG/ML
INJECTION, SOLUTION INTRAMUSCULAR; INTRAVENOUS
Status: DISPENSED
Start: 2025-07-15

## (undated) RX ORDER — DIPHENHYDRAMINE HYDROCHLORIDE 50 MG/ML
INJECTION INTRAMUSCULAR; INTRAVENOUS
Status: DISPENSED
Start: 2020-08-04

## (undated) RX ORDER — PROPOFOL 10 MG/ML
INJECTION, EMULSION INTRAVENOUS
Status: DISPENSED
Start: 2025-04-14

## (undated) RX ORDER — ONDANSETRON 2 MG/ML
INJECTION INTRAMUSCULAR; INTRAVENOUS
Status: DISPENSED
Start: 2021-03-09

## (undated) RX ORDER — PROPOFOL 10 MG/ML
INJECTION, EMULSION INTRAVENOUS
Status: DISPENSED
Start: 2019-05-28

## (undated) RX ORDER — ONDANSETRON 2 MG/ML
INJECTION INTRAMUSCULAR; INTRAVENOUS
Status: DISPENSED
Start: 2025-04-14

## (undated) RX ORDER — ATROPA BELLADONNA AND OPIUM 16.2; 3 MG/1; MG/1
SUPPOSITORY RECTAL
Status: DISPENSED
Start: 2021-03-09

## (undated) RX ORDER — DEXAMETHASONE SODIUM PHOSPHATE 10 MG/ML
INJECTION, SOLUTION INTRAMUSCULAR; INTRAVENOUS
Status: DISPENSED
Start: 2025-07-15

## (undated) RX ORDER — EPHEDRINE SULFATE 50 MG/ML
INJECTION, SOLUTION INTRAMUSCULAR; INTRAVENOUS; SUBCUTANEOUS
Status: DISPENSED
Start: 2025-07-15

## (undated) RX ORDER — CEFTRIAXONE SODIUM 1 G/50ML
INJECTION, SOLUTION INTRAVENOUS
Status: DISPENSED
Start: 2021-03-09

## (undated) RX ORDER — ONDANSETRON 2 MG/ML
INJECTION INTRAMUSCULAR; INTRAVENOUS
Status: DISPENSED
Start: 2019-05-28

## (undated) RX ORDER — CEFUROXIME AXETIL 250 MG/1
TABLET ORAL
Status: DISPENSED
Start: 2020-08-10

## (undated) RX ORDER — ONDANSETRON 2 MG/ML
INJECTION INTRAMUSCULAR; INTRAVENOUS
Status: DISPENSED
Start: 2021-11-02

## (undated) RX ORDER — KETAMINE HYDROCHLORIDE 50 MG/ML
INJECTION, SOLUTION INTRAMUSCULAR; INTRAVENOUS
Status: DISPENSED
Start: 2019-05-28

## (undated) RX ORDER — DEXAMETHASONE SODIUM PHOSPHATE 4 MG/ML
INJECTION, SOLUTION INTRA-ARTICULAR; INTRALESIONAL; INTRAMUSCULAR; INTRAVENOUS; SOFT TISSUE
Status: DISPENSED
Start: 2020-08-04

## (undated) RX ORDER — FENTANYL CITRATE 50 UG/ML
INJECTION, SOLUTION INTRAMUSCULAR; INTRAVENOUS
Status: DISPENSED
Start: 2021-11-02

## (undated) RX ORDER — PROPOFOL 10 MG/ML
INJECTION, EMULSION INTRAVENOUS
Status: DISPENSED
Start: 2025-07-15

## (undated) RX ORDER — PROPOFOL 10 MG/ML
INJECTION, EMULSION INTRAVENOUS
Status: DISPENSED
Start: 2020-08-04

## (undated) RX ORDER — GLYCOPYRROLATE 0.2 MG/ML
INJECTION, SOLUTION INTRAMUSCULAR; INTRAVENOUS
Status: DISPENSED
Start: 2025-07-15

## (undated) RX ORDER — FENTANYL CITRATE 50 UG/ML
INJECTION, SOLUTION INTRAMUSCULAR; INTRAVENOUS
Status: DISPENSED
Start: 2020-08-04

## (undated) RX ORDER — LIDOCAINE HYDROCHLORIDE 20 MG/ML
INJECTION, SOLUTION EPIDURAL; INFILTRATION; INTRACAUDAL; PERINEURAL
Status: DISPENSED
Start: 2020-08-04

## (undated) RX ORDER — PROPOFOL 10 MG/ML
INJECTION, EMULSION INTRAVENOUS
Status: DISPENSED
Start: 2019-05-07

## (undated) RX ORDER — PROPOFOL 10 MG/ML
INJECTION, EMULSION INTRAVENOUS
Status: DISPENSED
Start: 2021-03-09

## (undated) RX ORDER — FENTANYL CITRATE 50 UG/ML
INJECTION, SOLUTION INTRAMUSCULAR; INTRAVENOUS
Status: DISPENSED
Start: 2019-05-28

## (undated) RX ORDER — DEXAMETHASONE SODIUM PHOSPHATE 4 MG/ML
INJECTION, SOLUTION INTRA-ARTICULAR; INTRALESIONAL; INTRAMUSCULAR; INTRAVENOUS; SOFT TISSUE
Status: DISPENSED
Start: 2021-11-02

## (undated) RX ORDER — DEXMEDETOMIDINE HYDROCHLORIDE 4 UG/ML
INJECTION, SOLUTION INTRAVENOUS
Status: DISPENSED
Start: 2021-11-02

## (undated) RX ORDER — DEXAMETHASONE SODIUM PHOSPHATE 4 MG/ML
INJECTION, SOLUTION INTRA-ARTICULAR; INTRALESIONAL; INTRAMUSCULAR; INTRAVENOUS; SOFT TISSUE
Status: DISPENSED
Start: 2021-03-09

## (undated) RX ORDER — KETOROLAC TROMETHAMINE 30 MG/ML
INJECTION, SOLUTION INTRAMUSCULAR; INTRAVENOUS
Status: DISPENSED
Start: 2021-03-09

## (undated) RX ORDER — LIDOCAINE HYDROCHLORIDE 20 MG/ML
INJECTION, SOLUTION EPIDURAL; INFILTRATION; INTRACAUDAL; PERINEURAL
Status: DISPENSED
Start: 2021-03-09

## (undated) RX ORDER — IOPAMIDOL 755 MG/ML
INJECTION, SOLUTION INTRAVASCULAR
Status: DISPENSED
Start: 2021-11-02

## (undated) RX ORDER — SCOLOPAMINE TRANSDERMAL SYSTEM 1 MG/1
PATCH, EXTENDED RELEASE TRANSDERMAL
Status: DISPENSED
Start: 2019-05-07

## (undated) RX ORDER — SCOLOPAMINE TRANSDERMAL SYSTEM 1 MG/1
PATCH, EXTENDED RELEASE TRANSDERMAL
Status: DISPENSED
Start: 2021-03-09

## (undated) RX ORDER — KETAMINE HYDROCHLORIDE 50 MG/ML
INJECTION, SOLUTION INTRAMUSCULAR; INTRAVENOUS
Status: DISPENSED
Start: 2019-05-07

## (undated) RX ORDER — SCOLOPAMINE TRANSDERMAL SYSTEM 1 MG/1
PATCH, EXTENDED RELEASE TRANSDERMAL
Status: DISPENSED
Start: 2020-08-04

## (undated) RX ORDER — ATROPA BELLADONNA AND OPIUM 16.2; 3 MG/1; MG/1
SUPPOSITORY RECTAL
Status: DISPENSED
Start: 2021-11-02

## (undated) RX ORDER — CEFTRIAXONE SODIUM 1 G/50ML
INJECTION, SOLUTION INTRAVENOUS
Status: DISPENSED
Start: 2021-11-02

## (undated) RX ORDER — CEFUROXIME AXETIL 250 MG/1
TABLET ORAL
Status: DISPENSED
Start: 2021-11-15

## (undated) RX ORDER — CEFTRIAXONE SODIUM 1 G/50ML
INJECTION, SOLUTION INTRAVENOUS
Status: DISPENSED
Start: 2019-05-07

## (undated) RX ORDER — DEXMEDETOMIDINE HYDROCHLORIDE 4 UG/ML
INJECTION, SOLUTION INTRAVENOUS
Status: DISPENSED
Start: 2020-08-04

## (undated) RX ORDER — KETOROLAC TROMETHAMINE 30 MG/ML
INJECTION, SOLUTION INTRAMUSCULAR; INTRAVENOUS
Status: DISPENSED
Start: 2019-05-07

## (undated) RX ORDER — CEFTRIAXONE SODIUM 1 G/50ML
INJECTION, SOLUTION INTRAVENOUS
Status: DISPENSED
Start: 2019-05-28

## (undated) RX ORDER — FENTANYL CITRATE 50 UG/ML
INJECTION, SOLUTION INTRAMUSCULAR; INTRAVENOUS
Status: DISPENSED
Start: 2021-03-09

## (undated) RX ORDER — FENTANYL CITRATE 50 UG/ML
INJECTION, SOLUTION INTRAMUSCULAR; INTRAVENOUS
Status: DISPENSED
Start: 2019-05-07

## (undated) RX ORDER — NEOSTIGMINE METHYLSULFATE 0.5 MG/ML
INJECTION INTRAVENOUS
Status: DISPENSED
Start: 2019-05-07

## (undated) RX ORDER — GLYCOPYRROLATE 0.2 MG/ML
INJECTION, SOLUTION INTRAMUSCULAR; INTRAVENOUS
Status: DISPENSED
Start: 2021-11-02

## (undated) RX ORDER — LIDOCAINE HYDROCHLORIDE 20 MG/ML
INJECTION, SOLUTION EPIDURAL; INFILTRATION; INTRACAUDAL; PERINEURAL
Status: DISPENSED
Start: 2019-05-07

## (undated) RX ORDER — CEFUROXIME AXETIL 250 MG/1
TABLET ORAL
Status: DISPENSED
Start: 2019-06-03

## (undated) RX ORDER — GLYCOPYRROLATE 0.2 MG/ML
INJECTION, SOLUTION INTRAMUSCULAR; INTRAVENOUS
Status: DISPENSED
Start: 2019-05-07

## (undated) RX ORDER — LIDOCAINE HYDROCHLORIDE 20 MG/ML
INJECTION, SOLUTION EPIDURAL; INFILTRATION; INTRACAUDAL; PERINEURAL
Status: DISPENSED
Start: 2019-05-28

## (undated) RX ORDER — LIDOCAINE HYDROCHLORIDE 20 MG/ML
INJECTION, SOLUTION EPIDURAL; INFILTRATION; INTRACAUDAL; PERINEURAL
Status: DISPENSED
Start: 2021-11-02

## (undated) RX ORDER — SCOLOPAMINE TRANSDERMAL SYSTEM 1 MG/1
PATCH, EXTENDED RELEASE TRANSDERMAL
Status: DISPENSED
Start: 2021-11-02

## (undated) RX ORDER — ONDANSETRON 2 MG/ML
INJECTION INTRAMUSCULAR; INTRAVENOUS
Status: DISPENSED
Start: 2025-07-15

## (undated) RX ORDER — DEXAMETHASONE SODIUM PHOSPHATE 4 MG/ML
INJECTION, SOLUTION INTRA-ARTICULAR; INTRALESIONAL; INTRAMUSCULAR; INTRAVENOUS; SOFT TISSUE
Status: DISPENSED
Start: 2019-05-07

## (undated) RX ORDER — EPHEDRINE SULFATE 50 MG/ML
INJECTION, SOLUTION INTRAMUSCULAR; INTRAVENOUS; SUBCUTANEOUS
Status: DISPENSED
Start: 2025-04-14

## (undated) RX ORDER — ONDANSETRON 2 MG/ML
INJECTION INTRAMUSCULAR; INTRAVENOUS
Status: DISPENSED
Start: 2019-05-07